# Patient Record
Sex: FEMALE | Race: WHITE | NOT HISPANIC OR LATINO | Employment: OTHER | URBAN - METROPOLITAN AREA
[De-identification: names, ages, dates, MRNs, and addresses within clinical notes are randomized per-mention and may not be internally consistent; named-entity substitution may affect disease eponyms.]

---

## 2017-03-23 ENCOUNTER — CLINICAL SUPPORT (OUTPATIENT)
Dept: SMOKING CESSATION | Facility: CLINIC | Age: 72
End: 2017-03-23
Payer: COMMERCIAL

## 2017-03-23 DIAGNOSIS — F17.200 NICOTINE DEPENDENCE: Primary | ICD-10-CM

## 2017-03-23 PROCEDURE — 99404 PREV MED CNSL INDIV APPRX 60: CPT | Mod: S$GLB,,,

## 2017-03-23 PROCEDURE — 99999 PR PBB SHADOW E&M-EST. PATIENT-LVL I: CPT | Mod: PBBFAC,,,

## 2017-03-23 RX ORDER — NICOTINE 7MG/24HR
1 PATCH, TRANSDERMAL 24 HOURS TRANSDERMAL DAILY
Qty: 28 PATCH | Refills: 0 | Status: SHIPPED | OUTPATIENT
Start: 2017-03-23 | End: 2017-04-22

## 2017-03-23 RX ORDER — DM/P-EPHED/ACETAMINOPH/DOXYLAM 30-7.5/3
2 LIQUID (ML) ORAL
Qty: 96 LOZENGE | Refills: 0 | Status: SHIPPED | OUTPATIENT
Start: 2017-03-23 | End: 2017-04-22

## 2017-03-23 NOTE — Clinical Note
Pt seen at intake today. She states she smoked her last cigarette last nigh. She was smoking 20 cigs/day.  Discussed tobacco cessation medication of 7 mg nicotine patch QD and 2 mg nicotine lozenge PRN (1-2 per hour in place of cigarettes). Will see pt back in group in 1 wk.

## 2017-03-23 NOTE — PROGRESS NOTES
See Tobacco Cessation Intake Form for patient assessment and recommendations.  Exhaled carbon monoxide level was 4 ppm per [facility]

## 2017-03-30 ENCOUNTER — CLINICAL SUPPORT (OUTPATIENT)
Dept: SMOKING CESSATION | Facility: CLINIC | Age: 72
End: 2017-03-30
Payer: COMMERCIAL

## 2017-03-30 DIAGNOSIS — F17.200 NICOTINE DEPENDENCE: Primary | ICD-10-CM

## 2017-03-30 PROCEDURE — 99407 BEHAV CHNG SMOKING > 10 MIN: CPT | Mod: S$GLB,,,

## 2017-03-30 NOTE — PROGRESS NOTES
Individual Follow-Up Form    3/30/2017    Clinical Status of Patient: Outpatient    Length of Service: 15 minutes    Continuing Medication: yes  Patches    Other Medications: none     Target Symptoms: Withdrawal and medication side effects. The following were  rated moderate (3) to severe (4) on TCRS:  · Moderate (3): none  · Severe (4): none    Comments:  Called patient to check on her progress towards tobacco cessation. She has not been in group due to recent illness. She has been tobacco free for over a week now. She states that as soon as she started using patches the urge to smoke left her. She remains on tobacco cessation medications of 7 mg nicotine patch QD and 2 mg nicotine lozenge PRN. She has never received lozenges from pharmacy. Checked medication list and it was received by pharmacy on 3/23/17. She states her really does not need them. Congratulated her on her success and encourage her to keep up the great work. Reminded her of group this coming Monday. She states she will be there. No adverse effects/mental changes noted at this time.    Diagnosis: F17.200    Next Visit: 1 week

## 2017-04-03 ENCOUNTER — CLINICAL SUPPORT (OUTPATIENT)
Dept: SMOKING CESSATION | Facility: CLINIC | Age: 72
End: 2017-04-03
Payer: COMMERCIAL

## 2017-04-03 DIAGNOSIS — F17.200 NICOTINE DEPENDENCE: ICD-10-CM

## 2017-04-03 PROCEDURE — 90853 GROUP PSYCHOTHERAPY: CPT | Mod: S$GLB,,,

## 2017-04-03 PROCEDURE — 99999 PR PBB SHADOW E&M-EST. PATIENT-LVL I: CPT | Mod: PBBFAC,,,

## 2017-04-03 NOTE — Clinical Note
Pt seen in group last night. She is tobacco free at this time. Pt remains on tobacco cessation medication of 7 mg nicotine patch QD and 2 mg nicotine lozenge PRN (1-2 per hour in place of cigarettes) for break through tobacco cravings/urges. Exhaled carbon monoxide level was 0 ppm per [facility]. No adverse effects/mental changes noted at this time. Will see pt back in group in 1 wk.

## 2017-04-04 NOTE — PROGRESS NOTES
Smoking Cessation Group Session #4    Site: [facility]  Date:  4/3/17  Clinical Status of Patient: Outpatient   Length of Service and Code: 90 minutes - 03255   Number in Attendance: 3  Group Activities/Focus of Group:  completion of TCRS (Tobacco Cessation Rating Scale) reviewed strategies, habitual behavior, stress, and high risk situations. Introduced stress with addition interventions, SOLVE, relaxation with interventions, nutrition, exercise, weight gain, and the importance of rewarding oneself for accomplishments toward becoming tobacco free. Open discussion of all items with interventions.   Target symptoms:  withdrawal and medication side effects             The following were rated moderate (3) to severe (4) on TCRS:       Moderate 3: nausea/upset stomach     Severe 4: none  Patient's Response to Intervention:  Active participation, self-disclosure, supportive of group and peers. Pt is tobacco free at this time. Pt remains on tobacco cessation medication of 7 mg nicotine patch QD and 2 mg nicotine lozenge PRN (1-2 per hour in place of cigarettes) for break through tobacco urges/cravings. Discussed symptoms rated as 3 or 4 on TCRS scale. All related to NRT or withdrawal symptoms. None bothersome to pt at this time, will monitor. No problems or adverse effects noted at this time. Congratulated her on her success and encouraged her to keep up the great work. Pt's exhaled carbon monoxide level was 0 ppm per smokerlyzer. Will see pt back in group 1 wk.   Progress Toward Goals and Other Mental Status Changes: The patient denies any abnormal behavioral or mental changes at this time.  Diagnosis: Z72.0  Plan: The patient will continue with group therapy sessions and tobacco cessation medication monitoring by CTTS.   Return to Clinic: 1 week

## 2017-04-10 ENCOUNTER — CLINICAL SUPPORT (OUTPATIENT)
Dept: SMOKING CESSATION | Facility: CLINIC | Age: 72
End: 2017-04-10
Payer: COMMERCIAL

## 2017-04-10 DIAGNOSIS — F17.200 NICOTINE DEPENDENCE: ICD-10-CM

## 2017-04-10 PROCEDURE — 90853 GROUP PSYCHOTHERAPY: CPT | Mod: S$GLB,,,

## 2017-04-10 NOTE — Clinical Note
Pt seen in group last night. She continues to smoke 5 cigs/day. Pt remains on tobacco cessation medication of 7 mg nicotine patch QD and 2 mg nicotine lozenge PRN (1-2 per hour in place of cigarettes) for break through tobacco cravings/urges. Pt will continue with rate reduction. Exhaled carbon monoxide level was 5 ppm per [facility]. No adverse effects/mental changes noted at this time. Will see pt back in group in 1 wk.

## 2017-04-11 NOTE — PROGRESS NOTES
Smoking Cessation Group Session #5    Site: [facility]  Date:  4/10/17  Clinical Status of Patient: Outpatient   Length of Service and Code: 90 minutes - [id number]   Number in Attendance: 4  Group Activities/Focus of Group:  completion of TCRS (Tobacco Cessation Rating Scale) reviewed strategies, habitual behavior, high risks situations, understanding urges and cravings, stress and relaxation with open discussion and additional interventions, Introduced lapses, relapses, understanding them and analyzing the situation of a lapse, conflict issues that may be linked to a lapse.   Target symptoms:  withdrawal and medication side effects             The following were rated moderate (3) to severe (4) on TCRS:       Moderate 3: none     Severe 4: none  Patient's Response to Intervention:  Active participation, self-disclosure, supportive of group and peers. Pt continues to smoke 7 cigs/day. Pt remains on tobacco cessation medication of 7 mg nicotine patch QD and 2 mg nicotine lozenge PRN (1-2 per hour in place of cigarettes) for break through tobacco urges/cravings. Discussed symptoms rated as 3 or 4 on TCRS scale, none reported at this time. No problems or adverse effects noted at this time. Pt asked to reduce smoking rate by 2 cigs/day. Pt's exhaled carbon monoxide level was 5 ppm per smokerlyzer. Will see pt back in group 1 wk.   Progress Toward Goals and Other Mental Status Changes: Pt will continue with rate reduction plan and wait times prior to smoking. The patient denies any abnormal behavioral or mental changes at this time.  Diagnosis: Z72.0  Plan: The patient will continue with group therapy sessions and tobacco cessation medication monitoring by CTTS.   Return to Clinic: 1 week

## 2017-05-29 ENCOUNTER — TELEPHONE (OUTPATIENT)
Dept: SMOKING CESSATION | Facility: CLINIC | Age: 72
End: 2017-05-29

## 2017-05-29 NOTE — TELEPHONE ENCOUNTER
Called to check up on patient, she has not been in group. She states she is currently in hospital, but will return to group as soon as she can. Will check up on her later. She has gone back to smoking.

## 2018-03-13 ENCOUNTER — TELEPHONE (OUTPATIENT)
Dept: SMOKING CESSATION | Facility: CLINIC | Age: 73
End: 2018-03-13

## 2018-03-14 ENCOUNTER — CLINICAL SUPPORT (OUTPATIENT)
Dept: SMOKING CESSATION | Facility: CLINIC | Age: 73
End: 2018-03-14
Payer: COMMERCIAL

## 2018-03-14 DIAGNOSIS — F17.200 NICOTINE DEPENDENCE: Primary | ICD-10-CM

## 2018-03-14 PROCEDURE — 99407 BEHAV CHNG SMOKING > 10 MIN: CPT | Mod: S$GLB,,,

## 2019-08-28 ENCOUNTER — OFFICE VISIT (OUTPATIENT)
Dept: URGENT CARE | Facility: CLINIC | Age: 74
End: 2019-08-28
Payer: MEDICARE

## 2019-08-28 VITALS
RESPIRATION RATE: 18 BRPM | WEIGHT: 170 LBS | HEART RATE: 67 BPM | TEMPERATURE: 96 F | DIASTOLIC BLOOD PRESSURE: 95 MMHG | OXYGEN SATURATION: 94 % | HEIGHT: 60 IN | BODY MASS INDEX: 33.38 KG/M2 | SYSTOLIC BLOOD PRESSURE: 188 MMHG

## 2019-08-28 DIAGNOSIS — I10 ELEVATED BLOOD PRESSURE READING IN OFFICE WITH DIAGNOSIS OF HYPERTENSION: ICD-10-CM

## 2019-08-28 DIAGNOSIS — R21 RASH AND NONSPECIFIC SKIN ERUPTION: Primary | ICD-10-CM

## 2019-08-28 PROCEDURE — 99203 OFFICE O/P NEW LOW 30 MIN: CPT | Mod: S$GLB,,, | Performed by: PHYSICIAN ASSISTANT

## 2019-08-28 PROCEDURE — 3077F SYST BP >= 140 MM HG: CPT | Mod: CPTII,S$GLB,, | Performed by: PHYSICIAN ASSISTANT

## 2019-08-28 PROCEDURE — 3080F PR MOST RECENT DIASTOLIC BLOOD PRESSURE >= 90 MM HG: ICD-10-PCS | Mod: CPTII,S$GLB,, | Performed by: PHYSICIAN ASSISTANT

## 2019-08-28 PROCEDURE — 3080F DIAST BP >= 90 MM HG: CPT | Mod: CPTII,S$GLB,, | Performed by: PHYSICIAN ASSISTANT

## 2019-08-28 PROCEDURE — 3077F PR MOST RECENT SYSTOLIC BLOOD PRESSURE >= 140 MM HG: ICD-10-PCS | Mod: CPTII,S$GLB,, | Performed by: PHYSICIAN ASSISTANT

## 2019-08-28 PROCEDURE — 99203 PR OFFICE/OUTPT VISIT, NEW, LEVL III, 30-44 MIN: ICD-10-PCS | Mod: S$GLB,,, | Performed by: PHYSICIAN ASSISTANT

## 2019-08-28 RX ORDER — TRIAMCINOLONE ACETONIDE 1 MG/G
OINTMENT TOPICAL 2 TIMES DAILY
Qty: 30 G | Refills: 0 | Status: ON HOLD | OUTPATIENT
Start: 2019-08-28 | End: 2020-08-28 | Stop reason: CLARIF

## 2019-08-28 RX ORDER — HYDROXYZINE HYDROCHLORIDE 25 MG/1
25 TABLET, FILM COATED ORAL 3 TIMES DAILY PRN
Qty: 20 TABLET | Refills: 0 | Status: ON HOLD | OUTPATIENT
Start: 2019-08-28 | End: 2020-08-28 | Stop reason: CLARIF

## 2019-08-29 NOTE — PATIENT INSTRUCTIONS
Self-Care for Skin Rashes     Pat your skin dry. Do not rub.     When your skin reacts to a substance your body is sensitive to, it can cause a rash. You can treat most rashes at home by keeping the skin clean and dry. Many rashes may get better on their own within 2 to 3 days. You may need medical attention if your rash itches, drains, or hurts, particularly if the rash is getting worse.  What can cause a skin rash?  · Sun poisoning, caused by too much exposure to the sun  · An irritant or allergic reaction to a certain type of food, plant, or chemical, such as  shellfish, poison ivy, and or cleaning products  · An infection caused by a fungus (ringworm), virus (chickenpox), or bacteria (strep)  · Bites or infestation caused by insects or pests, such as ticks, lice, or mites  · Dry skin, which is often seen during the winter months and in older people  How can I control itching and skin damage?  · Take soothing lukewarm baths in a colloidal oatmeal product. You can buy this at the [employer].  · Do your best not to scratch. Clip fingernails short, especially in young children, to reduce skin damage if scratching does occur.  · Use moisturizing skin lotion instead of scratching your dry skin.  · Use sunscreen whenever going out into direct sun.  · Use only mild cleansing agents whenever possible.  · Wash with mild, nonirritating soap and warm water.  · Wear clothing that breathes, such as cotton shirts or canvas shoes.  · If fluid is seeping from the rash, cover it loosely with clean gauze to absorb the discharge.  · Many rashes are contagious. Prevent the rash from spreading to others by washing your hands often before or after touching others with any skin rash.  Use medicine  · Antihistamines such as diphenhydramine can help control itching. But use with caution because they can make you drowsy.  · Using over-the-counter hydrocortisone cream on small rashes may help reduce swelling and itching  · Most  over-the-counter antifungal medicines can treat athletes foot and many other fungal infections of the skin.  Check with your healthcare provider  Call your healthcare provider if:  · You were told that you have a fungal infection on your skin to make sure you have the correct type of medicine.  · You have questions or concerns about medicines or their side effects.     Call 911  Call 911 if either of these occur:  · Your tongue or lips start to swell  · You have difficulty breathing      Call your healthcare provider  Call your healthcare provider if any of these occur:  · Temperature of more than 101.0°F (38.3°C), or as directed  · Sore throat, a cough, or unusual fatigue  · Red, oozy, or painful rash gets worse. These are signs of infection.  · Rash covers your face, genitals, or most of your body  · Crusty sores or red rings that begin to spread  · You were exposed to someone who has a contagious rash, such as scabies or lice.  · Red bulls-eye rash with a white center (a sign of Lyme disease)  · You were told that you have resistant bacteria (MRSA) on your skin.   Date Last Reviewed: 5/12/2015  © 2793-4032 [employer]. [address] [place] [address], [place] [address]. All rights reserved. This information is not intended as a substitute for professional medical care. Always follow your healthcare professional's instructions.

## 2019-08-29 NOTE — PROGRESS NOTES
Subjective:       Patient ID: Melvi Conrad is a 73 y.o. female.    Vitals:  height is 5' (1.524 m) and weight is 77.1 kg (170 lb). Her temperature is 96 °F (35.6 °C). Her blood pressure is 188/95 (abnormal) and her pulse is 67. Her respiration is 18 and oxygen saturation is 94% (abnormal).     Chief Complaint: Rash    73-year-old female presents with pruritic rash on right forearm.  She noticed this about 2-3 weeks ago.  She has tried over-the-counter Benadryl cream with mild relief.  Patient states she feels otherwise healthy.  Patient also reports some swelling of the right wrist, but no pain or trauma.  Patient also has some erythematous papules over the lower extremities and on the trunk.  Patient denies starting any new medicines or contact with [facility] outside.    Rash   This is a new problem. The current episode started 1 to 4 weeks ago (2 weeks). The problem has been gradually worsening since onset. The affected locations include the left arm, right arm and back. The rash is characterized by redness and itchiness. She was exposed to nothing. Pertinent negatives include no cough, fever or sore throat. Past treatments include anti-itch cream and antibiotic cream. The treatment provided mild relief.       Constitution: Negative for chills and fever.   HENT: Negative for facial swelling and sore throat.    Neck: Negative for painful lymph nodes.   Eyes: Negative for eye itching and eyelid swelling.   Respiratory: Negative for cough.    Musculoskeletal: Negative for joint pain and joint swelling.   Skin: Positive for rash. Negative for color change, pale, wound, abrasion, laceration, lesion, skin thickening/induration, puncture wound, erythema, bruising, abscess, avulsion and hives.   Allergic/Immunologic: Positive for itching. Negative for environmental allergies, immunocompromised state and hives.   Hematologic/Lymphatic: Negative for swollen lymph nodes.       Objective:      Physical Exam   Constitutional:  She is oriented to person, place, and time. She appears well-developed and well-nourished.   HENT:   Head: Normocephalic and atraumatic. Head is without abrasion, without contusion and without laceration.   Right Ear: External ear normal.   Left Ear: External ear normal.   Nose: Nose normal.   Mouth/Throat: Oropharynx is clear and moist.   Eyes: Pupils are equal, round, and reactive to light. Conjunctivae, EOM and lids are normal.   Neck: Trachea normal, full passive range of motion without pain and phonation normal. Neck supple.   Cardiovascular: Normal rate, regular rhythm and normal heart sounds.   Pulmonary/Chest: Effort normal and breath sounds normal. No stridor. No respiratory distress.   Musculoskeletal: Normal range of motion.        Right wrist: She exhibits swelling. She exhibits normal range of motion and no tenderness.   Neurological: She is alert and oriented to person, place, and time.   Skin: Skin is warm, dry and intact. Capillary refill takes less than 2 seconds. Rash noted. No abrasion, no bruising, no burn, no ecchymosis, no laceration and no lesion noted. Rash is papular. No erythema.   Psychiatric: She has a normal mood and affect. Her speech is normal and behavior is normal. Judgment and thought content normal. Cognition and memory are normal.   Nursing note and vitals reviewed.              Assessment:       1. Rash and nonspecific skin eruption    2. Elevated blood pressure reading in office with diagnosis of hypertension        Plan:         Rash and nonspecific skin eruption  -     triamcinolone acetonide 0.1% (KENALOG) 0.1 % ointment; Apply topically 2 (two) times daily. AAA  Dispense: 30 g; Refill: 0  -     hydrOXYzine HCl (ATARAX) 25 MG tablet; Take 1 tablet (25 mg total) by mouth 3 (three) times daily as needed for Itching.  Dispense: 20 tablet; Refill: 0    Elevated blood pressure reading in office with diagnosis of hypertension      Patient Instructions       Self-Care for Skin  Rashes     Pat your skin dry. Do not rub.     When your skin reacts to a substance your body is sensitive to, it can cause a rash. You can treat most rashes at home by keeping the skin clean and dry. Many rashes may get better on their own within 2 to 3 days. You may need medical attention if your rash itches, drains, or hurts, particularly if the rash is getting worse.  What can cause a skin rash?  · Sun poisoning, caused by too much exposure to the sun  · An irritant or allergic reaction to a certain type of food, plant, or chemical, such as  shellfish, poison ivy, and or cleaning products  · An infection caused by a fungus (ringworm), virus (chickenpox), or bacteria (strep)  · Bites or infestation caused by insects or pests, such as ticks, lice, or mites  · Dry skin, which is often seen during the winter months and in older people  How can I control itching and skin damage?  · Take soothing lukewarm baths in a colloidal oatmeal product. You can buy this at the [employer].  · Do your best not to scratch. Clip fingernails short, especially in young children, to reduce skin damage if scratching does occur.  · Use moisturizing skin lotion instead of scratching your dry skin.  · Use sunscreen whenever going out into direct sun.  · Use only mild cleansing agents whenever possible.  · Wash with mild, nonirritating soap and warm water.  · Wear clothing that breathes, such as cotton shirts or canvas shoes.  · If fluid is seeping from the rash, cover it loosely with clean gauze to absorb the discharge.  · Many rashes are contagious. Prevent the rash from spreading to others by washing your hands often before or after touching others with any skin rash.  Use medicine  · Antihistamines such as diphenhydramine can help control itching. But use with caution because they can make you drowsy.  · Using over-the-counter hydrocortisone cream on small rashes may help reduce swelling and itching  · Most over-the-counter antifungal  medicines can treat athletes foot and many other fungal infections of the skin.  Check with your healthcare provider  Call your healthcare provider if:  · You were told that you have a fungal infection on your skin to make sure you have the correct type of medicine.  · You have questions or concerns about medicines or their side effects.     Call 911  Call 911 if either of these occur:  · Your tongue or lips start to swell  · You have difficulty breathing      Call your healthcare provider  Call your healthcare provider if any of these occur:  · Temperature of more than 101.0°F (38.3°C), or as directed  · Sore throat, a cough, or unusual fatigue  · Red, oozy, or painful rash gets worse. These are signs of infection.  · Rash covers your face, genitals, or most of your body  · Crusty sores or red rings that begin to spread  · You were exposed to someone who has a contagious rash, such as scabies or lice.  · Red bulls-eye rash with a white center (a sign of Lyme disease)  · You were told that you have resistant bacteria (MRSA) on your skin.   Date Last Reviewed: 5/12/2015  © 1799-7573 [employer]. [address] [place] [address], [place] [address]. All rights reserved. This information is not intended as a substitute for professional medical care. Always follow your healthcare professional's instructions.

## 2020-08-28 ENCOUNTER — HOSPITAL ENCOUNTER (INPATIENT)
Facility: HOSPITAL | Age: 75
LOS: 12 days | Discharge: HOME | DRG: 177 | End: 2020-09-09
Attending: EMERGENCY MEDICINE | Admitting: INTERNAL MEDICINE
Payer: MEDICARE

## 2020-08-28 DIAGNOSIS — E87.5 HYPERKALEMIA: ICD-10-CM

## 2020-08-28 DIAGNOSIS — G40.909 SEIZURE DISORDER: ICD-10-CM

## 2020-08-28 DIAGNOSIS — Z75.1: ICD-10-CM

## 2020-08-28 DIAGNOSIS — I10 ESSENTIAL HYPERTENSION: Chronic | ICD-10-CM

## 2020-08-28 DIAGNOSIS — Z20.822 SUSPECTED COVID-19 VIRUS INFECTION: ICD-10-CM

## 2020-08-28 DIAGNOSIS — U07.1 COVID-19: ICD-10-CM

## 2020-08-28 DIAGNOSIS — Z86.16 HISTORY OF 2019 NOVEL CORONAVIRUS DISEASE (COVID-19): ICD-10-CM

## 2020-08-28 DIAGNOSIS — R06.02 SOB (SHORTNESS OF BREATH): ICD-10-CM

## 2020-08-28 DIAGNOSIS — R79.89 TROPONIN LEVEL ELEVATED: ICD-10-CM

## 2020-08-28 DIAGNOSIS — R09.02 HYPOXIA: ICD-10-CM

## 2020-08-28 DIAGNOSIS — J44.1 COPD EXACERBATION: Primary | ICD-10-CM

## 2020-08-28 DIAGNOSIS — J96.01 ACUTE HYPOXEMIC RESPIRATORY FAILURE: ICD-10-CM

## 2020-08-28 PROBLEM — D64.9 ANEMIA: Status: ACTIVE | Noted: 2020-08-28

## 2020-08-28 LAB
ABO + RH BLD: NORMAL
ALBUMIN SERPL BCP-MCNC: 2.6 G/DL (ref 3.5–5.2)
ALP SERPL-CCNC: 70 U/L (ref 55–135)
ALT SERPL W/O P-5'-P-CCNC: 20 U/L (ref 10–44)
ANION GAP SERPL CALC-SCNC: 8 MMOL/L (ref 8–16)
AST SERPL-CCNC: 21 U/L (ref 10–40)
BASOPHILS # BLD AUTO: 0.03 K/UL (ref 0–0.2)
BASOPHILS NFR BLD: 0.4 % (ref 0–1.9)
BILIRUB SERPL-MCNC: 0.7 MG/DL (ref 0.1–1)
BLD GP AB SCN CELLS X3 SERPL QL: NORMAL
BNP SERPL-MCNC: 167 PG/ML (ref 0–99)
BUN SERPL-MCNC: 13 MG/DL (ref 8–23)
CALCIUM SERPL-MCNC: 8.5 MG/DL (ref 8.7–10.5)
CHLORIDE SERPL-SCNC: 101 MMOL/L (ref 95–110)
CK SERPL-CCNC: 34 U/L (ref 20–180)
CO2 SERPL-SCNC: 28 MMOL/L (ref 23–29)
CREAT SERPL-MCNC: 0.8 MG/DL (ref 0.5–1.4)
CRP SERPL-MCNC: 101 MG/L (ref 0–8.2)
D DIMER PPP IA.FEU-MCNC: 2.54 MG/L FEU
DIFFERENTIAL METHOD: ABNORMAL
EOSINOPHIL # BLD AUTO: 0.6 K/UL (ref 0–0.5)
EOSINOPHIL NFR BLD: 8.7 % (ref 0–8)
ERYTHROCYTE [DISTWIDTH] IN BLOOD BY AUTOMATED COUNT: 16.1 % (ref 11.5–14.5)
EST. GFR  (AFRICAN AMERICAN): >60 ML/MIN/1.73 M^2
EST. GFR  (NON AFRICAN AMERICAN): >60 ML/MIN/1.73 M^2
FERRITIN SERPL-MCNC: 353 NG/ML (ref 20–300)
GLUCOSE SERPL-MCNC: 124 MG/DL (ref 70–110)
HCT VFR BLD AUTO: 22.4 % (ref 37–48.5)
HGB BLD-MCNC: 7 G/DL (ref 12–16)
IMM GRANULOCYTES # BLD AUTO: 0.07 K/UL (ref 0–0.04)
IMM GRANULOCYTES NFR BLD AUTO: 1 % (ref 0–0.5)
LACTATE SERPL-SCNC: 1.3 MMOL/L (ref 0.5–2.2)
LDH SERPL L TO P-CCNC: 432 U/L (ref 110–260)
LYMPHOCYTES # BLD AUTO: 1.2 K/UL (ref 1–4.8)
LYMPHOCYTES NFR BLD: 17.3 % (ref 18–48)
MCH RBC QN AUTO: 33.3 PG (ref 27–31)
MCHC RBC AUTO-ENTMCNC: 31.3 G/DL (ref 32–36)
MCV RBC AUTO: 107 FL (ref 82–98)
MONOCYTES # BLD AUTO: 0.8 K/UL (ref 0.3–1)
MONOCYTES NFR BLD: 11.8 % (ref 4–15)
NEUTROPHILS # BLD AUTO: 4.3 K/UL (ref 1.8–7.7)
NEUTROPHILS NFR BLD: 60.8 % (ref 38–73)
NRBC BLD-RTO: 1 /100 WBC
PLATELET # BLD AUTO: 197 K/UL (ref 150–350)
PMV BLD AUTO: 11.1 FL (ref 9.2–12.9)
POTASSIUM SERPL-SCNC: 4.4 MMOL/L (ref 3.5–5.1)
PROCALCITONIN SERPL IA-MCNC: 0.07 NG/ML
PROT SERPL-MCNC: 6.8 G/DL (ref 6–8.4)
RBC # BLD AUTO: 2.1 M/UL (ref 4–5.4)
SODIUM SERPL-SCNC: 137 MMOL/L (ref 136–145)
TROPONIN I SERPL DL<=0.01 NG/ML-MCNC: 0.09 NG/ML (ref 0–0.03)
WBC # BLD AUTO: 7.05 K/UL (ref 3.9–12.7)

## 2020-08-28 PROCEDURE — 84484 ASSAY OF TROPONIN QUANT: CPT

## 2020-08-28 PROCEDURE — 86140 C-REACTIVE PROTEIN: CPT

## 2020-08-28 PROCEDURE — 93010 EKG 12-LEAD: ICD-10-PCS | Mod: 76,,, | Performed by: INTERNAL MEDICINE

## 2020-08-28 PROCEDURE — 27000221 HC OXYGEN, UP TO 24 HOURS

## 2020-08-28 PROCEDURE — 85379 FIBRIN DEGRADATION QUANT: CPT

## 2020-08-28 PROCEDURE — 99285 EMERGENCY DEPT VISIT HI MDM: CPT | Mod: 25

## 2020-08-28 PROCEDURE — 93005 ELECTROCARDIOGRAM TRACING: CPT

## 2020-08-28 PROCEDURE — 63600175 PHARM REV CODE 636 W HCPCS: Performed by: HOSPITALIST

## 2020-08-28 PROCEDURE — 86901 BLOOD TYPING SEROLOGIC RH(D): CPT

## 2020-08-28 PROCEDURE — 11000001 HC ACUTE MED/SURG PRIVATE ROOM

## 2020-08-28 PROCEDURE — 94761 N-INVAS EAR/PLS OXIMETRY MLT: CPT

## 2020-08-28 PROCEDURE — 85025 COMPLETE CBC W/AUTO DIFF WBC: CPT

## 2020-08-28 PROCEDURE — 93010 ELECTROCARDIOGRAM REPORT: CPT | Mod: ,,, | Performed by: INTERNAL MEDICINE

## 2020-08-28 PROCEDURE — 99285 EMERGENCY DEPT VISIT HI MDM: CPT | Mod: ,,, | Performed by: EMERGENCY MEDICINE

## 2020-08-28 PROCEDURE — 99285 PR EMERGENCY DEPT VISIT,LEVEL V: ICD-10-PCS | Mod: ,,, | Performed by: EMERGENCY MEDICINE

## 2020-08-28 PROCEDURE — 99223 PR INITIAL HOSPITAL CARE,LEVL III: ICD-10-PCS | Mod: ,,, | Performed by: HOSPITALIST

## 2020-08-28 PROCEDURE — 80053 COMPREHEN METABOLIC PANEL: CPT

## 2020-08-28 PROCEDURE — 83615 LACTATE (LD) (LDH) ENZYME: CPT

## 2020-08-28 PROCEDURE — 82550 ASSAY OF CK (CPK): CPT

## 2020-08-28 PROCEDURE — 83880 ASSAY OF NATRIURETIC PEPTIDE: CPT

## 2020-08-28 PROCEDURE — 99900035 HC TECH TIME PER 15 MIN (STAT)

## 2020-08-28 PROCEDURE — 94640 AIRWAY INHALATION TREATMENT: CPT

## 2020-08-28 PROCEDURE — 82728 ASSAY OF FERRITIN: CPT

## 2020-08-28 PROCEDURE — 93010 ELECTROCARDIOGRAM REPORT: CPT | Mod: 76,,, | Performed by: INTERNAL MEDICINE

## 2020-08-28 PROCEDURE — 84145 PROCALCITONIN (PCT): CPT

## 2020-08-28 PROCEDURE — 99223 1ST HOSP IP/OBS HIGH 75: CPT | Mod: ,,, | Performed by: HOSPITALIST

## 2020-08-28 PROCEDURE — 83605 ASSAY OF LACTIC ACID: CPT

## 2020-08-28 PROCEDURE — 86920 COMPATIBILITY TEST SPIN: CPT

## 2020-08-28 RX ORDER — IPRATROPIUM BROMIDE AND ALBUTEROL SULFATE 2.5; .5 MG/3ML; MG/3ML
3 SOLUTION RESPIRATORY (INHALATION) EVERY 6 HOURS PRN
Status: DISCONTINUED | OUTPATIENT
Start: 2020-08-28 | End: 2020-08-29

## 2020-08-28 RX ORDER — ASPIRIN 325 MG
325 TABLET ORAL DAILY
Status: DISCONTINUED | OUTPATIENT
Start: 2020-08-29 | End: 2020-08-28

## 2020-08-28 RX ORDER — RIVAROXABAN 20 MG/1
20 TABLET, FILM COATED ORAL
Status: ON HOLD | COMMUNITY
End: 2020-09-05 | Stop reason: HOSPADM

## 2020-08-28 RX ORDER — ATORVASTATIN CALCIUM 40 MG/1
40 TABLET, FILM COATED ORAL DAILY
COMMUNITY

## 2020-08-28 RX ORDER — SODIUM CHLORIDE 0.9 % (FLUSH) 0.9 %
10 SYRINGE (ML) INJECTION
Status: DISCONTINUED | OUTPATIENT
Start: 2020-08-28 | End: 2020-08-29

## 2020-08-28 RX ORDER — ATORVASTATIN CALCIUM 20 MG/1
40 TABLET, FILM COATED ORAL DAILY
Status: DISCONTINUED | OUTPATIENT
Start: 2020-08-29 | End: 2020-09-09 | Stop reason: HOSPADM

## 2020-08-28 RX ORDER — IBUPROFEN 200 MG
16 TABLET ORAL
Status: DISCONTINUED | OUTPATIENT
Start: 2020-08-28 | End: 2020-09-09 | Stop reason: HOSPADM

## 2020-08-28 RX ORDER — HYDRALAZINE HYDROCHLORIDE 25 MG/1
25 TABLET, FILM COATED ORAL 4 TIMES DAILY
Status: DISCONTINUED | OUTPATIENT
Start: 2020-08-29 | End: 2020-09-09 | Stop reason: HOSPADM

## 2020-08-28 RX ORDER — LEVETIRACETAM 500 MG/1
500 TABLET ORAL 2 TIMES DAILY
COMMUNITY

## 2020-08-28 RX ORDER — METOPROLOL TARTRATE 25 MG/1
25 TABLET, FILM COATED ORAL 2 TIMES DAILY
COMMUNITY

## 2020-08-28 RX ORDER — LEVETIRACETAM 500 MG/1
500 TABLET ORAL 2 TIMES DAILY
Status: DISCONTINUED | OUTPATIENT
Start: 2020-08-29 | End: 2020-09-09 | Stop reason: HOSPADM

## 2020-08-28 RX ORDER — ALBUTEROL SULFATE 90 UG/1
2 AEROSOL, METERED RESPIRATORY (INHALATION) EVERY 6 HOURS PRN
Status: DISCONTINUED | OUTPATIENT
Start: 2020-08-28 | End: 2020-09-09 | Stop reason: HOSPADM

## 2020-08-28 RX ORDER — SODIUM CHLORIDE 0.9 % (FLUSH) 0.9 %
10 SYRINGE (ML) INJECTION
Status: DISCONTINUED | OUTPATIENT
Start: 2020-08-28 | End: 2020-09-09 | Stop reason: HOSPADM

## 2020-08-28 RX ORDER — AMLODIPINE BESYLATE 10 MG/1
10 TABLET ORAL DAILY
Status: DISCONTINUED | OUTPATIENT
Start: 2020-08-29 | End: 2020-09-09 | Stop reason: HOSPADM

## 2020-08-28 RX ORDER — ONDANSETRON 2 MG/ML
4 INJECTION INTRAMUSCULAR; INTRAVENOUS EVERY 8 HOURS PRN
Status: DISCONTINUED | OUTPATIENT
Start: 2020-08-28 | End: 2020-09-09 | Stop reason: HOSPADM

## 2020-08-28 RX ORDER — TALC
6 POWDER (GRAM) TOPICAL NIGHTLY PRN
Status: DISCONTINUED | OUTPATIENT
Start: 2020-08-28 | End: 2020-09-09 | Stop reason: HOSPADM

## 2020-08-28 RX ORDER — AMLODIPINE BESYLATE 10 MG/1
10 TABLET ORAL DAILY
COMMUNITY

## 2020-08-28 RX ORDER — CEFTRIAXONE 1 G/1
1 INJECTION, POWDER, FOR SOLUTION INTRAMUSCULAR; INTRAVENOUS
Status: DISCONTINUED | OUTPATIENT
Start: 2020-08-29 | End: 2020-09-02

## 2020-08-28 RX ORDER — CYANOCOBALAMIN (VITAMIN B-12) 250 MCG
500 TABLET ORAL DAILY
Status: DISCONTINUED | OUTPATIENT
Start: 2020-08-29 | End: 2020-09-05

## 2020-08-28 RX ORDER — ACETAMINOPHEN 325 MG/1
650 TABLET ORAL EVERY 4 HOURS PRN
Status: DISCONTINUED | OUTPATIENT
Start: 2020-08-28 | End: 2020-09-09 | Stop reason: HOSPADM

## 2020-08-28 RX ORDER — ENOXAPARIN SODIUM 100 MG/ML
40 INJECTION SUBCUTANEOUS EVERY 24 HOURS
Status: DISCONTINUED | OUTPATIENT
Start: 2020-08-28 | End: 2020-08-28

## 2020-08-28 RX ORDER — BENZONATATE 100 MG/1
100 CAPSULE ORAL 3 TIMES DAILY PRN
COMMUNITY

## 2020-08-28 RX ORDER — HYDRALAZINE HYDROCHLORIDE 25 MG/1
25 TABLET, FILM COATED ORAL 4 TIMES DAILY
COMMUNITY

## 2020-08-28 RX ORDER — IPRATROPIUM BROMIDE AND ALBUTEROL SULFATE 2.5; .5 MG/3ML; MG/3ML
3 SOLUTION RESPIRATORY (INHALATION) ONCE
Status: COMPLETED | OUTPATIENT
Start: 2020-08-29 | End: 2020-08-28

## 2020-08-28 RX ORDER — PREDNISONE 20 MG/1
40 TABLET ORAL DAILY
Status: DISCONTINUED | OUTPATIENT
Start: 2020-08-29 | End: 2020-08-30

## 2020-08-28 RX ORDER — IBUPROFEN 200 MG
24 TABLET ORAL
Status: DISCONTINUED | OUTPATIENT
Start: 2020-08-28 | End: 2020-09-09 | Stop reason: HOSPADM

## 2020-08-28 RX ORDER — UBIDECARENONE 75 MG
500 CAPSULE ORAL DAILY
Status: ON HOLD | COMMUNITY
End: 2020-09-05 | Stop reason: HOSPADM

## 2020-08-28 RX ORDER — METOPROLOL TARTRATE 25 MG/1
25 TABLET, FILM COATED ORAL 2 TIMES DAILY
Status: DISCONTINUED | OUTPATIENT
Start: 2020-08-29 | End: 2020-09-09 | Stop reason: HOSPADM

## 2020-08-28 RX ADMIN — IPRATROPIUM BROMIDE AND ALBUTEROL SULFATE 3 ML: .5; 2.5 SOLUTION RESPIRATORY (INHALATION) at 11:08

## 2020-08-28 RX ADMIN — CEFTRIAXONE SODIUM 1 G: 1 INJECTION, POWDER, FOR SOLUTION INTRAMUSCULAR; INTRAVENOUS at 11:08

## 2020-08-28 RX ADMIN — AZITHROMYCIN MONOHYDRATE 500 MG: 500 INJECTION, POWDER, LYOPHILIZED, FOR SOLUTION INTRAVENOUS at 11:08

## 2020-08-28 RX ADMIN — ENOXAPARIN SODIUM 40 MG: 40 INJECTION SUBCUTANEOUS at 10:08

## 2020-08-28 NOTE — ED NOTES
Patient identifiers for Melvi Conrad 74 y.o. female checked and correct.  Chief Complaint   Patient presents with    COVID-19 Concerns     Diagnosed with covid on 08/21, called EMS for increasing shortness of breath today. Hx COPD.    Shortness of Breath     Past Medical History:   Diagnosis Date    COPD (chronic obstructive pulmonary disease)     Hypertension     Renal disorder     Seizures      Allergies reported:   Review of patient's allergies indicates:   Allergen Reactions    Nexium [esomeprazole magnesium] Hives    Pcn [penicillins] Hives     Patient presents via EMS on oxygen via [place] (2.5L normally). EMS increased to 6L via NC.     LOC: Patient is awake, alert, and aware of environment with an appropriate affect. Patient is oriented x 4 and speaking appropriately. Reports increased anxiety.  APPEARANCE: Patient resting comfortably and in no acute distress. Patient is clean and well groomed, patient's clothing is properly fastened.  HEENT: Wearing mask.   SKIN: The skin is warm and dry. Patient has normal skin turgor and moist mucus membranes. Denies fever or chills. Bruises to bilateral arms.   MUSKULOSKELETAL: Patient is moving all extremities well, no obvious deformities noted. Pulses intact. Ambulatory by self.   RESPIRATORY: Airway is open and patent. Respirations are spontaneous and labored with extra effort and rate. Reports worsening SOB since being transferred to rehab. States not receiving breathing treatments there. Positive from covid.   CARDIAC: Patient has a normal rate and rhythm. 73 on cardiac monitor. Bilateral arm and hand edema noted. Denies chest pain.   ABDOMEN: No distention noted. Soft and non-tender upon palpation. Reports nausea.  NEUROLOGICAL: pupils 3 mm, PERRL. Facial expression is symmetrical. Hand grasps are equal bilaterally. Normal sensation in all extremities when touched with finger.

## 2020-08-29 LAB
25(OH)D3+25(OH)D2 SERPL-MCNC: 35 NG/ML (ref 30–96)
ALBUMIN SERPL BCP-MCNC: 2.7 G/DL (ref 3.5–5.2)
ALP SERPL-CCNC: 76 U/L (ref 55–135)
ALT SERPL W/O P-5'-P-CCNC: 21 U/L (ref 10–44)
ANION GAP SERPL CALC-SCNC: 12 MMOL/L (ref 8–16)
AST SERPL-CCNC: 27 U/L (ref 10–40)
BASOPHILS # BLD AUTO: 0.04 K/UL (ref 0–0.2)
BASOPHILS NFR BLD: 0.6 % (ref 0–1.9)
BILIRUB SERPL-MCNC: 0.5 MG/DL (ref 0.1–1)
BUN SERPL-MCNC: 12 MG/DL (ref 8–23)
CALCIUM SERPL-MCNC: 8.9 MG/DL (ref 8.7–10.5)
CHLORIDE SERPL-SCNC: 100 MMOL/L (ref 95–110)
CO2 SERPL-SCNC: 25 MMOL/L (ref 23–29)
CREAT SERPL-MCNC: 0.8 MG/DL (ref 0.5–1.4)
DIFFERENTIAL METHOD: ABNORMAL
EOSINOPHIL # BLD AUTO: 0.1 K/UL (ref 0–0.5)
EOSINOPHIL NFR BLD: 1.6 % (ref 0–8)
ERYTHROCYTE [DISTWIDTH] IN BLOOD BY AUTOMATED COUNT: 16.8 % (ref 11.5–14.5)
EST. GFR  (AFRICAN AMERICAN): >60 ML/MIN/1.73 M^2
EST. GFR  (NON AFRICAN AMERICAN): >60 ML/MIN/1.73 M^2
GLUCOSE SERPL-MCNC: 141 MG/DL (ref 70–110)
HCT VFR BLD AUTO: 24.2 % (ref 37–48.5)
HGB BLD-MCNC: 7.3 G/DL (ref 12–16)
IMM GRANULOCYTES # BLD AUTO: 0.09 K/UL (ref 0–0.04)
IMM GRANULOCYTES NFR BLD AUTO: 1.3 % (ref 0–0.5)
LYMPHOCYTES # BLD AUTO: 0.6 K/UL (ref 1–4.8)
LYMPHOCYTES NFR BLD: 8.5 % (ref 18–48)
MCH RBC QN AUTO: 33.2 PG (ref 27–31)
MCHC RBC AUTO-ENTMCNC: 30.2 G/DL (ref 32–36)
MCV RBC AUTO: 110 FL (ref 82–98)
MONOCYTES # BLD AUTO: 0.4 K/UL (ref 0.3–1)
MONOCYTES NFR BLD: 6.1 % (ref 4–15)
NEUTROPHILS # BLD AUTO: 5.8 K/UL (ref 1.8–7.7)
NEUTROPHILS NFR BLD: 81.9 % (ref 38–73)
NRBC BLD-RTO: 1 /100 WBC
PLATELET # BLD AUTO: 203 K/UL (ref 150–350)
PMV BLD AUTO: 11.1 FL (ref 9.2–12.9)
POTASSIUM SERPL-SCNC: 4.4 MMOL/L (ref 3.5–5.1)
PROT SERPL-MCNC: 7.3 G/DL (ref 6–8.4)
RBC # BLD AUTO: 2.2 M/UL (ref 4–5.4)
SODIUM SERPL-SCNC: 137 MMOL/L (ref 136–145)
TROPONIN I SERPL DL<=0.01 NG/ML-MCNC: 0.05 NG/ML (ref 0–0.03)
WBC # BLD AUTO: 7.05 K/UL (ref 3.9–12.7)

## 2020-08-29 PROCEDURE — 97530 THERAPEUTIC ACTIVITIES: CPT

## 2020-08-29 PROCEDURE — 93005 ELECTROCARDIOGRAM TRACING: CPT

## 2020-08-29 PROCEDURE — 97165 OT EVAL LOW COMPLEX 30 MIN: CPT

## 2020-08-29 PROCEDURE — 36415 COLL VENOUS BLD VENIPUNCTURE: CPT

## 2020-08-29 PROCEDURE — 85025 COMPLETE CBC W/AUTO DIFF WBC: CPT

## 2020-08-29 PROCEDURE — 80053 COMPREHEN METABOLIC PANEL: CPT

## 2020-08-29 PROCEDURE — 25000003 PHARM REV CODE 250: Performed by: INTERNAL MEDICINE

## 2020-08-29 PROCEDURE — 25000242 PHARM REV CODE 250 ALT 637 W/ HCPCS: Performed by: HOSPITALIST

## 2020-08-29 PROCEDURE — 97535 SELF CARE MNGMENT TRAINING: CPT

## 2020-08-29 PROCEDURE — 63600175 PHARM REV CODE 636 W HCPCS: Performed by: HOSPITALIST

## 2020-08-29 PROCEDURE — 99233 SBSQ HOSP IP/OBS HIGH 50: CPT | Mod: ,,, | Performed by: INTERNAL MEDICINE

## 2020-08-29 PROCEDURE — 27100098 HC SPACER

## 2020-08-29 PROCEDURE — 99900035 HC TECH TIME PER 15 MIN (STAT)

## 2020-08-29 PROCEDURE — 94761 N-INVAS EAR/PLS OXIMETRY MLT: CPT

## 2020-08-29 PROCEDURE — 93010 EKG 12-LEAD: ICD-10-PCS | Mod: ,,, | Performed by: INTERNAL MEDICINE

## 2020-08-29 PROCEDURE — 93010 ELECTROCARDIOGRAM REPORT: CPT | Mod: ,,, | Performed by: INTERNAL MEDICINE

## 2020-08-29 PROCEDURE — 11000001 HC ACUTE MED/SURG PRIVATE ROOM

## 2020-08-29 PROCEDURE — 82306 VITAMIN D 25 HYDROXY: CPT

## 2020-08-29 PROCEDURE — 27000221 HC OXYGEN, UP TO 24 HOURS

## 2020-08-29 PROCEDURE — 84484 ASSAY OF TROPONIN QUANT: CPT

## 2020-08-29 PROCEDURE — 25000003 PHARM REV CODE 250: Performed by: HOSPITALIST

## 2020-08-29 PROCEDURE — 25500020 PHARM REV CODE 255: Performed by: INTERNAL MEDICINE

## 2020-08-29 PROCEDURE — 94640 AIRWAY INHALATION TREATMENT: CPT

## 2020-08-29 PROCEDURE — 25000242 PHARM REV CODE 250 ALT 637 W/ HCPCS: Performed by: INTERNAL MEDICINE

## 2020-08-29 PROCEDURE — 99233 PR SUBSEQUENT HOSPITAL CARE,LEVL III: ICD-10-PCS | Mod: ,,, | Performed by: INTERNAL MEDICINE

## 2020-08-29 PROCEDURE — 97162 PT EVAL MOD COMPLEX 30 MIN: CPT

## 2020-08-29 RX ORDER — FLUTICASONE FUROATE AND VILANTEROL 100; 25 UG/1; UG/1
1 POWDER RESPIRATORY (INHALATION) DAILY
Status: DISCONTINUED | OUTPATIENT
Start: 2020-08-29 | End: 2020-09-09 | Stop reason: HOSPADM

## 2020-08-29 RX ORDER — BENZONATATE 100 MG/1
100 CAPSULE ORAL 3 TIMES DAILY PRN
Status: DISCONTINUED | OUTPATIENT
Start: 2020-08-29 | End: 2020-09-09 | Stop reason: HOSPADM

## 2020-08-29 RX ORDER — IPRATROPIUM BROMIDE AND ALBUTEROL SULFATE 2.5; .5 MG/3ML; MG/3ML
3 SOLUTION RESPIRATORY (INHALATION)
Status: DISCONTINUED | OUTPATIENT
Start: 2020-08-29 | End: 2020-09-09 | Stop reason: HOSPADM

## 2020-08-29 RX ADMIN — ATORVASTATIN CALCIUM 40 MG: 20 TABLET, FILM COATED ORAL at 09:08

## 2020-08-29 RX ADMIN — HYDRALAZINE HYDROCHLORIDE 25 MG: 25 TABLET ORAL at 04:08

## 2020-08-29 RX ADMIN — GUAIFENESIN AND DEXTROMETHORPHAN HYDROBROMIDE 1 TABLET: 600; 30 TABLET, EXTENDED RELEASE ORAL at 08:08

## 2020-08-29 RX ADMIN — PREDNISONE 40 MG: 20 TABLET ORAL at 09:08

## 2020-08-29 RX ADMIN — LEVETIRACETAM 500 MG: 500 TABLET ORAL at 09:08

## 2020-08-29 RX ADMIN — HYDRALAZINE HYDROCHLORIDE 25 MG: 25 TABLET ORAL at 09:08

## 2020-08-29 RX ADMIN — AMLODIPINE BESYLATE 10 MG: 10 TABLET ORAL at 09:08

## 2020-08-29 RX ADMIN — IPRATROPIUM BROMIDE AND ALBUTEROL SULFATE 3 ML: .5; 2.5 SOLUTION RESPIRATORY (INHALATION) at 07:08

## 2020-08-29 RX ADMIN — HYDRALAZINE HYDROCHLORIDE 25 MG: 25 TABLET ORAL at 12:08

## 2020-08-29 RX ADMIN — FLUTICASONE FUROATE AND VILANTEROL TRIFENATATE 1 PUFF: 100; 25 POWDER RESPIRATORY (INHALATION) at 09:08

## 2020-08-29 RX ADMIN — LEVETIRACETAM 500 MG: 500 TABLET ORAL at 08:08

## 2020-08-29 RX ADMIN — ALBUTEROL SULFATE 2 PUFF: 90 AEROSOL, METERED RESPIRATORY (INHALATION) at 01:08

## 2020-08-29 RX ADMIN — IPRATROPIUM BROMIDE AND ALBUTEROL SULFATE 3 ML: .5; 2.5 SOLUTION RESPIRATORY (INHALATION) at 08:08

## 2020-08-29 RX ADMIN — BENZONATATE 100 MG: 100 CAPSULE ORAL at 04:08

## 2020-08-29 RX ADMIN — METOPROLOL TARTRATE 25 MG: 25 TABLET, FILM COATED ORAL at 08:08

## 2020-08-29 RX ADMIN — MELATONIN TAB 3 MG 6 MG: 3 TAB at 01:08

## 2020-08-29 RX ADMIN — METOPROLOL TARTRATE 25 MG: 25 TABLET, FILM COATED ORAL at 09:08

## 2020-08-29 RX ADMIN — HYDRALAZINE HYDROCHLORIDE 25 MG: 25 TABLET ORAL at 08:08

## 2020-08-29 RX ADMIN — IPRATROPIUM BROMIDE AND ALBUTEROL SULFATE 3 ML: .5; 2.5 SOLUTION RESPIRATORY (INHALATION) at 01:08

## 2020-08-29 RX ADMIN — IOHEXOL 100 ML: 350 INJECTION, SOLUTION INTRAVENOUS at 02:08

## 2020-08-29 RX ADMIN — CYANOCOBALAMIN TAB 250 MCG 500 MCG: 250 TAB at 09:08

## 2020-08-29 RX ADMIN — PREDNISONE 40 MG: 20 TABLET ORAL at 12:08

## 2020-08-29 NOTE — PLAN OF CARE
DX:Covid with acute hypoxic resp failure    Shift Events:v/s q 8hrs and monitor spo2     Plan of Care:monitor covid s/s and resolve hypoxia, abt therapy     Neuro: A/O x4     Respiratory:5 liters      Cardiac:N/S     Diet:cardiac      Gtts: azithromycin      Skin:bruising

## 2020-08-29 NOTE — NURSING
Pt arrived to unit via stretcher  with  escort, she is alert and oriented x4. Pt is verbal and has no complaints of pain at this time and appears to be in no distress. Pt v/s stable she was orieinted to room  and changed into gown with telemetry placed,and placed on 5 liters n/c.

## 2020-08-29 NOTE — PROGRESS NOTES
Hospital Medicine  Progress Note  [facility]      Patient Name: Melvi Conrad  MRN:  [id number]  Hospital Medicine Team: [facility] G Surinder Schrader MD  Date of Admission:  8/28/2020     Length of Stay:  LOS: 1 day       Principal Problem:  Acute hypoxemic respiratory failure      HPI:  75 yo F with PMHx HTN, CKD3, COPD on 2L NC, epilepsy, and CAD presents to the hospital from [facility] with worsening SOB 1 week after being discharged from [facility] for COVID-19 pneumonia. The patient reports that over the last 2 days at the [facility], she has developed a progressive decline in her breathing. She reports that she was unable to participate much with therapy today and yesterday 2/2 SOB. She reports that she had been unable to receive her nebulizer treatments while in the SNF due to concerns of aerosolization of the virus, and she believes this significantly affected her recovery. She denies any chest pain, lightheadedness, palpitations, fevers, chills, or new cough.       Per son, the pateint was admitted to [facility] for symptoms of difficulty breathing, coughing, and fatigue. She tested positive for COVID-19 on admission and was admitted from 8/5- 8/21. Pt. And son reports that she spent the majority of her stay in the ICU, but she was never intubated. She received remdesivir, steroids, and convalescent plasma per son. Pt. Was also reportedly started on xarelto, but the reasoning is unclear. The son and pt. Deny that she ever had a blood clot. They states that she was put on the medication to prevent blood clots in the setting of COVID-19 infection. She was improving at time of discharged, but did require 2L NC at time of discharge which she had been on at [facility] until today.      Hospital Course:  Management of acute respiratory failure with hypoxia due to COVID-19 infection.    Interval History:     Patient lying in bed, mild respiratory distress. Reports SOB is improving.  Notes bruising on his arms due to xarelto use. Denies upper extremity pain or swelling.     Review of Systems:  Constitutional: no fever or chills  Respiratory: Positive for cough and SOB  Cardiovascular: no chest pain or palpitations  Gastrointestinal: no nausea or vomiting, no abdominal pain or change in bowel habits  Genitourinary: no hematuria or dysuria  Integument/Breast: no rash or pruritis  Hematologic/Lymphatic: no easy bruising or lymphadenopathy  Musculoskeletal: no arthralgias or myalgias  Neurological: no seizures or tremors  Behavioral/Psych: no depression or anxiety    Inpatient Medications:    Current Facility-Administered Medications:     acetaminophen tablet 650 mg, 650 mg, Oral, Q4H PRN, Lan Cerrato MD    albuterol inhaler 2 puff, 2 puff, Inhalation, Q6H PRN, Lan Cerrato MD, 2 puff at 08/29/20 0116    albuterol-ipratropium 2.5 mg-0.5 mg/3 mL nebulizer solution 3 mL, 3 mL, Nebulization, Q6H WAKE, Lan Cerrato MD    amLODIPine tablet 10 mg, 10 mg, Oral, Daily, Lan Cerrato MD    atorvastatin tablet 40 mg, 40 mg, Oral, Daily, Lan Cerrato MD    azithromycin 500 mg in dextrose 5 % 250 mL IVPB (ready to mix system), 500 mg, Intravenous, Q24H, Lan Cerrato MD, Last Rate: 250 mL/hr at 08/28/20 2347, 500 mg at 08/28/20 2347    cefTRIAXone injection 1 g, 1 g, Intravenous, Q24H, Lan Cerrato MD, 1 g at 08/28/20 2347    cyanocobalamin tablet 500 mcg, 500 mcg, Oral, Daily, Lan Cerrato MD    glucose chewable tablet 16 g, 16 g, Oral, PRN, Lan Cerrato MD    glucose chewable tablet 24 g, 24 g, Oral, PRN, Lan Cerrato MD    hydrALAZINE tablet 25 mg, 25 mg, Oral, QID, Lan Cerrato MD    levETIRAcetam tablet 500 mg, 500 mg, Oral, BID, Lan Cerrato MD    melatonin tablet 6 mg, 6 mg, Oral, Nightly PRN, Lan Cerrato MD, 6 mg at 08/29/20 0105    metoprolol tartrate (LOPRESSOR) tablet 25 mg, 25 mg, Oral, BID, Lan Cerrato MD    ondansetron injection 4 mg, 4 mg,  "Intravenous, Q8H PRN, Lan Cerrato MD    predniSONE tablet 40 mg, 40 mg, Oral, Daily, Lan Cerrato MD, 40 mg at 08/29/20 0014    promethazine (PHENERGAN) 6.25 mg in dextrose 5 % 50 mL IVPB, 6.25 mg, Intravenous, Q6H PRN, Lan Cerrato MD    sodium chloride 0.9% flush 10 mL, 10 mL, Intravenous, PRN, Koki Mehta MD      Physical Exam:    No intake or output data in the 24 hours ending 08/29/20 0821  Wt Readings from Last 3 Encounters:   08/28/20 81.5 kg (179 lb 10.8 oz)   08/28/19 77.1 kg (170 lb)   07/28/14 66.2 kg (146 lb)       BP (!) 140/79 (BP Location: Left leg, Patient Position: Lying)   Pulse 82   Temp 98.1 °F (36.7 °C) (Oral)   Resp (!) 22   Ht 4' 11" (1.499 m)   Wt 81.5 kg (179 lb 10.8 oz)   SpO2 95%   Breastfeeding No   BMI 36.29 kg/m²     GEN: mild respiratory distress, conversant  Resp: Decreased breath sounds B/L, no wheezes or rales, normal work of breathing   CV: RRR, II/VI systolic ejection murmur, no edema  GI: soft, NTND  Skin: no rash  Neuro: CN grossly intact, no focal neurologic deficits    Laboratory:  No results found for: [id number]    Recent Labs   Lab 08/28/20 1939 08/29/20  0343   WBC 7.05 7.05   LYMPH 17.3*  1.2 8.5*  0.6*   HGB 7.0* 7.3*   HCT 22.4* 24.2*    203     Recent Labs   Lab 08/28/20 1939 08/29/20  0343    137   K 4.4 4.4    100   CO2 28 25   BUN 13 12   CREATININE 0.8 0.8   * 141*   CALCIUM 8.5* 8.9     Recent Labs   Lab 08/28/20 1939 08/29/20  0343   ALKPHOS 70 76   ALT 20 21   AST 21 27   ALBUMIN 2.6* 2.7*   PROT 6.8 7.3   BILITOT 0.7 0.5        Recent Labs     08/28/20  1939   DDIMER 2.54*   FERRITIN 353*   .0*   *   *   TROPONINI 0.092*   CPK 34       All labs within the last 24 hours were reviewed.     Microbiology:  Microbiology Results (last 7 days)     ** No results found for the last 168 hours. **            Imaging      No results found for this or any previous visit.    CTA Chest " Non-Coronary (PE Study)  Narrative: EXAMINATION:  CTA CHEST NON CORONARY    CLINICAL HISTORY:  PE suspected, high pretest prob;    TECHNIQUE:  Low dose axial images, sagittal and coronal reformations were obtained from the thoracic inlet to the lung bases following the IV administration of 100 mL of Omnipaque 350.  Contrast timing was optimized to evaluate the pulmonary arteries.    COMPARISON:  Radiograph chest AP portable 08/28/2020    FINDINGS:  Pulmonary vasculature: Satisfactory opacification of the pulmonary arterial system with no filling defect to the segmental level.    Aorta: Left-sided aortic arch.  No aneurysmal dilatation.  Scattered calcified and noncalcified atherosclerosis.    Base of Neck: No significant abnormality.    Thoracic soft tissues: Unremarkable    Heart: Normal size. No effusion.  Prominent pericardial fat.  Coronary artery, mitral annular, and aortic valve atherosclerotic calcification..    Veronica/Mediastinum: No pathologic kenia enlargement.    Airways: The central airways are patent.    Lungs/Pleura: Small bilateral pleural effusions with associated compressive atelectasis, right greater than left.  Interlobular septal thickening and bilateral ground-glass opacities, primarily within a peripheral distribution.  No pneumothorax..    Esophagus: Mildly distended and fluid-filled.    Upper Abdomen: No abnormality of the partially imaged upper abdomen.    Bones: Degenerative changes.  No acute fracture. No suspicious lytic or sclerotic lesions.  Impression: No pulmonary thromboembolism or infarct.    Small bilateral pleural effusions with associated compressive atelectasis.  Bilateral peripherally based ground-glass opacities, which may be seen in the setting of viral infection.    Esophagus is distended with fluid, which could be concerning for aspiration risk.    Significant calcified and noncalcified atherosclerosis.    Electronically signed by resident: Skylar  Theo  Date:    08/29/2020  Time:    02:41    Electronically signed by: Asher Chavez MD  Date:    08/29/2020  Time:    03:07      All imaging within the last 24 hours was reviewed.     Assessment and Plan:    Active Hospital Problems    Diagnosis  POA    *Acute hypoxemic respiratory failure [J96.01]  Unknown    COVID-19 [U07.1]  Yes    COPD exacerbation [J44.1]  Unknown    Anemia [D64.9]  Unknown      Resolved Hospital Problems   No resolved problems to display.       Scheduled Meds:   albuterol-ipratropium  3 mL Nebulization Q6H WAKE    amLODIPine  10 mg Oral Daily    atorvastatin  40 mg Oral Daily    azithromycin  500 mg Intravenous Q24H    cefTRIAXone (ROCEPHIN) IVPB  1 g Intravenous Q24H    cyanocobalamin  500 mcg Oral Daily    hydrALAZINE  25 mg Oral QID    levETIRAcetam  500 mg Oral BID    metoprolol tartrate  25 mg Oral BID    predniSONE  40 mg Oral Daily     Continuous Infusions:  PRN Meds:.acetaminophen, albuterol, glucose, glucose, melatonin, ondansetron, promethazine (PHENERGAN) IVPB, sodium chloride 0.9%      COVID-19 Virus Infection  Viral Pneumonia due to COVID-19    - COVID-19 testing:   - Isolation: Airborne/Droplet. Surgical mask on patient. Notify Infection Control  - Diagnostics: Trend Q48hrs if stable, more frequently if patient decompensating     Laboratory/Study Frequency Result   CBC Admit & Q48h    CMP Admit & Q48h    D-dimer Admit & Q48h    Ferritin Admit & Q48h    CRP Admit & Q48h    CPK Admit & Q24h if elevated    LDH Admit & Q48h    Vitamin D Admit    BNP Admit    Troponin Admit & Q6h if elevated    Procalcitonin Admit      Lymphopenia, hyponatremia, hyperferritinemia, elevated troponin, elevated d-dimer, age, and medical comorbidities are significant predictors of poor clinical outcome    - Management: per [facility] COVID Treatment Protocol (4/15/20)    - Monitoring:   - Telemetry & Continuous Pulse Oximetry if > 3L via NC    - Nutrition:    - Multivitamin PO daily   -  Boost supplement   - Vitamin D 1000IU daily if deficient   - Ascorbic acid 500mg PO bid    - Supportive Care:   - acetaminophen 650mg PO Q6hr PRN fever/headache   - loperamide PRN viral diarrhea   - IVF if indicated, restrictive strategy preferred, no maintenance IV if able   - VTE PPx: enoxaparin or heparin SQ unless contraindicated    - Antibiotics:   - ceftriaxone 1g Q24h x 5 days  - azithromycin 500mg IV/PO x3    - Investigational Therapies:  - atorvastatin 40mg po daily    Acute Hypoxemic Respiratory Failure    - Order RT consult via Respiratory Communication for COVID Protocols  - Order Incentive Spirometer Q4h  - Order Flutter Valve Q4h  - Continuous Pulse Oximetry  - Goal SpO2 92-96%  - Supplemental O2 via LFNC, VentiMask, or HFNC (see Respiratory Support Oxygen Therapies)  - If wheezing, albuterol INH Q6h scheduled & PRN  - Proning Protocol if patient is a candidate (see HM Proning Protocol)   - GCS >13, able to self-prone  - If deterioration, may warrant trial of NIPPV and transfer to [facility] pressure room or immediate ICU consult  - on prednisone for COPD exacerbation so not starting dexamethasone    COPD exacerbation  - duonebs  - Breo   - prednisone 40 mg daily   - wean O2 as tolerated     Anemia  - Hgb 7.0 on presentation, baseline unclear. Pt. Denies any melena or hematochezia  - Pt. Recently started On xarelto per SNF paperwork, but no diagnosis of blood clot listed and pt. Denies ever having one  - CTA ordered to rule out PE  - will hold xarelto in meantime given the anemia  - Continue home B12 supplementation  - Monitor CBC and transfuse to goal >7.     Anticoagulation use  - on Xarelto at home  - family report that patient was prescribed Xarelto as ppx in the setting of COVID infection   - holding Xarelto due to anemia  - US upper and lower extremities negative for DVT  - no h/o afib   - followup with PCP to discuss resuming Xarelto     HTN  - Continue home meds norvasc, hydralazine, and lopressor    "  Hx of CAD  - Per SNF paperwork, full history unclear  - Pt. Denies any active chest pain, no ST changes compared to prior EKG  -Troponin mildly elevated at 0.092, but this appears to be chronic and is not surprising in setting of demand from hypoxia.   - Low-suspicion for ACS, but will continue to trend troponin.   - TTE ordered given the patient's reported cardiac history and acitve hypoxia without a baseline/recent TTE in system  -Continue statin        Advance Care Planning  Goals of care, counseling/discussion: Full code   Advance Care Planning     If patient transitions to Comfort-Focused Care, please place "Nurse Communication: End of Life Care, family members allowed to visit, including spouse/partner and adult children [please list names]. Please ask family to visit as a group and leave as a group.       VTE High Risk Prophylaxis:   VTE Risk Mitigation (From admission, onward)         Ordered     IP VTE HIGH RISK PATIENT  Once      08/28/20 2223     Place sequential compression device  Until discontinued      08/28/20 2223                  Subsequent Inpatient Hospital Care    Level 3 [id number] Total visit time was 35 minutes or greater with greater than 50% of time spent in counseling and coordination of care.         High Risk Conditions:  Patient has a condition that poses threat to life and bodily function: Severe Respiratory Distress        Surinder Schrader MD  Hospital Medicine Staff  Pager: [phone]; Spectra: [phone]          "

## 2020-08-29 NOTE — EKG INTERPRETATIONS - EMERGENCY DEPT.
Independently interpreted by MD:  Rate 68, NSR, no stemi, no ectopy, no hypertrophy, flipped T waves lat

## 2020-08-29 NOTE — PT/OT/SLP EVAL
Occupational Therapy   Evaluation    Name: Melvi Conrad  MRN: [id number]  Admitting Diagnosis:  Acute hypoxemic respiratory failure      Recommendations:     Discharge Recommendations: nursing facility, skilled(return to SNF)  Discharge Equipment Recommendations:  other (see comments)(tbd pending progression)  Barriers to discharge:  Inaccessible home environment    Assessment:     Melvi Conrad is a 74 y.o. female with a medical diagnosis of Acute hypoxemic respiratory failure. Pt presents with decreased endurance and impaired mobility performance as limited by cardiovascular status and generalized weakness. Pt very pleasant and willing to participate with OT/PT this morning. Pt explains she was admitted from [facility] where she was receiving PT services. Prior to this, pt lived in a  home with her grandson and grandson's wife. at baseline, pt was (I) with ADLs and mobilized with mod (I) using her rollator. during evaluation, pt required SBA for bed mobility and SBA to t/f to BSC using no AD. Pt explain her family works during the day and will not be able to assist her at time of d/c. Pt on 4L HFNC and desat ~80s following mobility with RN present. Following pursed lip breathing, pt returned to WNL with Sp02 levels. Pt voiced appreciation of therapy and voiced understanding of POC. Pt is a high fall risk at this time and is an excellent candidate for continued therapy. Performance deficits affecting function: weakness, impaired self care skills, impaired balance, impaired endurance, impaired functional mobilty, gait instability, decreased lower extremity function, decreased upper extremity function, impaired cardiopulmonary response to activity.  Pt would benefit from continued OT skilled services 4x/wk to improve daily living skills to optimize QOL. Pt is recommended to discharge to SNF at this time.      Rehab Prognosis: Good; patient would benefit from acute skilled OT services to address these deficits and  "reach maximum level of function.       Plan:     Patient to be seen 4 x/week to address the above listed problems via self-care/home management, therapeutic activities, therapeutic exercises  · Plan of Care Expires: 09/29/20  · Plan of Care Reviewed with: patient    Subjective     Chief Complaint: "I don't know what they were doing at [facility]. It seemed like my breathing was getting worse and worse, then I couldn't do much in therapy"  Patient/Family Comments/goals: "I was doing everything for myself I would love to be at that point again"    Occupational Profile:  Living Environment: pt lives with her grandson and grandson's wife in a SSH with no FRANKI. Pt uses a shower chair for bathing in a s/t combination.  Previous level of function: Previously at [facility] ([facility]) following d/c from [facility]. Prior to this, pt was (I) with ADLs and mobilizing with a rollator.   Roles and Routines: Pt enjoys going to the [facility].   Equipment Used at Home:  rollator, shower chair  Assistance upon Discharge: Family works; unable to assist     Pain/Comfort:  · Pain Rating 1: 0/10  · Pain Rating Post-Intervention 2: 0/10    Patients cultural, spiritual, Zoroastrian conflicts given the current situation: no    Objective:     Communicated with: RN prior to session.  Patient found HOB elevated with blood pressure cuff, pulse ox (continuous), telemetry, oxygen upon OT entry to room.    General Precautions: Standard, airborne, droplet, fall, contact   Orthopedic Precautions:N/A   Braces: N/A     Occupational Performance:    Bed Mobility:    · Patient completed Rolling/Turning to Right with stand by assistance  · Patient completed Scooting/Bridging with stand by assistance  · Patient completed Supine to Sit with stand by assistance    Functional Mobility/Transfers:  · Patient completed Sit <> Stand Transfer with stand by assistance  with  no assistive device   · Patient completed Bed <> Chair Transfer using Step Transfer technique with stand by " assistance with no assistive device  · Patient completed Toilet Transfer Step Transfer technique with stand by assistance with  no AD  · Functional Mobility: Pt completed stand with ~2 steps to BSC for toileting, followed by stand pivot to bedside chair with SBA. Pt on 4L and desat to ~80s following mobility.     Activities of Daily Living:  · Grooming: setup (A) for towelettes following toileting to wash hands   · Upper Body Dressing: minimum assistance adjusting gown fit at EOB   · Toileting: stand by assistance with pt using BSC for urination    Cognitive/Visual Perceptual:  Cognitive/Psychosocial Skills:     -       Oriented to: Person, Place, Time and Situation   -       Follows Commands/attention:Follows multistep  commands  -       Communication: clear/fluent  -       Memory: No Deficits noted  -       Safety awareness/insight to disability: intact   -       Mood/Affect/Coping skills/emotional control: Appropriate to situation    Physical Exam:  Balance:    -       demo good sitting balance and fair standing balance   Upper Extremity Range of Motion:     -       Right Upper Extremity: WNL  -       Left Upper Extremity: WNL  Upper Extremity Strength:    -       Right Upper Extremity: WNL  -       Left Upper Extremity: WNL   Strength:    -       Right Upper Extremity: WNL  -       Left Upper Extremity: WNL    AMPAC 6 Click ADL:  AMPAC Total Score: 18    Treatment & Education:  Pt educated on role of occupational therapy, POC, and safety during ADLs and functional mobility. Pt and OT discussed importance of safe, continued mobility to optimize daily living skills. Pt verbalized understanding. White board updated during session. Pt given instruction to call for medical staff/nurse for assistance.     Education:    Patient left up in chair with all lines intact, call button in reach and RN Ted present    GOALS:   Multidisciplinary Problems     Occupational Therapy Goals        Problem: Occupational Therapy  Goal    Goal Priority Disciplines Outcome Interventions   Occupational Therapy Goal     OT, PT/OT Ongoing, Progressing    Description: Goals to be met by: 9/29/2020     Patient will increase functional independence with ADLs by performing:    UE Dressing with Woods.  LE Dressing with Woods.  Grooming while standing with Stand-by Assistance.  Toileting from toilet with Stand-by Assistance for hygiene and clothing management.   Rolling to Bilateral with Woods.   Supine to sit with Woods.  Step transfer with Stand-by Assistance  Toilet transfer to toilet with Stand-by Assistance.  Pt to demonstrate pursed lip breathing for ADL tasks to reflect Sp02 WNL.  Pt to complete ADL tasks with <1 rest break for cardiovascular needs.                   History:     Past Medical History:   Diagnosis Date    COPD (chronic obstructive pulmonary disease)     Hypertension     Renal disorder     Seizures        History reviewed. No pertinent surgical history.    Time Tracking:     OT Date of Treatment: 08/29/20  OT Start Time: 0855  OT Stop Time: 0915  OT Total Time (min): 20 min    Billable Minutes:Evaluation 10 min  Self Care/Home Management 10 min    Rashmi Calixto OT  8/29/2020

## 2020-08-29 NOTE — PT/OT/SLP EVAL
Physical Therapy Evaluation    Patient Name:  Melvi Conrad   MRN:  [id number]    Recommendations:     Discharge Recommendations:  nursing facility, skilled   Discharge Equipment Recommendations: (TBD)   Barriers to discharge: decreased functional mobility requiring increased assist      Assessment:     Melvi Conrad is a 74 y.o. female admitted with a medical diagnosis of Acute hypoxemic respiratory failure.  She presents with the following impairments/functional limitations:  weakness, impaired endurance, impaired self care skills, impaired functional mobilty, decreased lower extremity function, gait instability, impaired balance, impaired cardiopulmonary response to activity. Pt evaluated on this date demonstrating impairments in functional mobility requiring increased assist. Pt recently at [facility] reporting positive results. Pt required SBA/CGA for all mobility on this date primarily limited by decreased cardiopulmonary response to activity. Pt on 4L O2 throughout this session w/[id number] to low 80's following transfer training. Pt would benefit from continued skilled acute PT 3x/wk to improve functional mobility.  Recommending pt receive PT services in SNF setting following discharge from hospital once medically cleared.     Rehab Prognosis: Good; patient would benefit from acute skilled PT services to address these deficits and reach maximum level of function.    Recent Surgery: * No surgery found *      Plan:     During this hospitalization, patient to be seen 3 x/week to address the identified rehab impairments via gait training, therapeutic activities, therapeutic exercises, neuromuscular re-education and progress toward the following goals:    · Plan of Care Expires:  09/28/20    Subjective     Chief Complaint: ARENAS  Patient/Family Comments/goals: Pt pleasant and willing to participate with therapy on this date.    Pain/Comfort:  · Pain Rating 1: 0/10    Patients cultural, spiritual,  Yazdanism conflicts given the current situation: no    Living Environment:  Pt lives w/grandson and grandson's wife in [facility] w/1 FRANKI.   Prior to admission, patients level of function was reported (I) w/ADLs, (I) amb household distances, and Mod I amb community distances w/Rollator.  Equipment used at home: rollator, shower chair.  DME owned (not currently used): none.  Upon discharge, patient will have assistance from family.    Objective:     Communicated with NSG prior to session.  Patient found HOB elevated with blood pressure cuff, pulse ox (continuous), telemetry, oxygen  upon PT entry to room.    General Precautions: Standard, airborne, contact, droplet, fall   Orthopedic Precautions:N/A   Braces: N/A     Exams:  · RLE ROM: WFL  · RLE Strength: WFL  · LLE ROM: WFL  · LLE Strength: WFL    Functional Mobility:  · Bed Mobility:     · Supine to Sit: stand by assistance  · Transfers:     · Sit to Stand:  contact guard assistance with hand-held assist  · Bed to Chair: contact guard assistance with  hand-held assist  using  Step Transfer  · Gait: 3ft CGA w/HHA. SpO2 decreased to low 80's while on 4L O2  · Balance: Sitting: SBA     Standing: CGA    Therapeutic Activities and Exercises:   - Sit-to-Stand: x3 trials CGA w/HHA  - Transfer Training x2 trials CGA w/HHA using step transfer  - Pt educated on:   -PT roles, expectations, and POC    -Safety with mobility   -Benefits of OOB activities to increase strength and functional mobility    -Performing ther ex for increasing LE ROM and strength   -Discharge recommendations     AM-PAC 6 CLICK MOBILITY  Total Score:17     Patient left up in chair with all lines intact, call button in reach and NSG notified.    GOALS:   Multidisciplinary Problems     Physical Therapy Goals        Problem: Physical Therapy Goal    Goal Priority Disciplines Outcome Goal Variances Interventions   Physical Therapy Goal     PT, PT/OT Ongoing, Progressing     Description: Goals to be met by:  2020    Patient will increase functional independence with mobility by performin. Supine to sit with Modified [place]  2. Sit to supine with Modified [place]  3. Sit to stand transfer with Modified [place]  4. Gait  x 20 feet with Stand-by Assistance using LRAD  5. Lower extremity exercise program x15 reps, with independence                      History:     Past Medical History:   Diagnosis Date    COPD (chronic obstructive pulmonary disease)     Hypertension     Renal disorder     Seizures        History reviewed. No pertinent surgical history.    Time Tracking:     PT Received On: 20  PT Start Time: 854     PT Stop Time: 917  PT Total Time (min): 23 min     Billable Minutes: Evaluation 10 and Therapeutic Activity 13      Jj Resendez, PT  2020

## 2020-08-29 NOTE — PLAN OF CARE
Problem: Occupational Therapy Goal  Goal: Occupational Therapy Goal  Description: Goals to be met by: 9/29/2020     Patient will increase functional independence with ADLs by performing:    UE Dressing with Coram.  LE Dressing with Coram.  Grooming while standing with Stand-by Assistance.  Toileting from toilet with Stand-by Assistance for hygiene and clothing management.   Rolling to Bilateral with Coram.   Supine to sit with [place].  Step transfer with Stand-by Assistance  Toilet transfer to toilet with Stand-by Assistance.  Pt to demonstrate pursed lip breathing for ADL tasks to reflect Sp02 WNL.  Pt to complete ADL tasks with <1 rest break for cardiovascular needs.  Evaluated pt and established OT POC. Continue OT as tolerated.  Rashmi Calixto OT  8/29/2020    Outcome: Ongoing, Progressing

## 2020-08-29 NOTE — ED PROVIDER NOTES
Encounter Date: 8/28/2020       History     Chief Complaint   Patient presents with    COVID-19 Concerns     Diagnosed with covid on 08/21, called EMS for increasing shortness of breath today. Hx COPD.    Shortness of Breath     HPI   Pt is a 73 YO F w/ hx of renal agenesis, CKD3, Aortic stenosis s/p valve replacement, HTN, epilepsy, COPD, COVID 19 presenting w/ c/o acute worsening of SOB. PT reports she was diagnosed w/ COVID x10 days ago at . Pt was discharged to  subacute rehab, has been stable w/ 2L NC, today required 3L and had worsening dyspnea. Denies cough, fever. Has had intermittent nausea w/o vomiting or diarrhea, Denies CP.     Review of patient's allergies indicates:   Allergen Reactions    Nexium [esomeprazole magnesium] Hives    Pcn [penicillins] Hives     Past Medical History:   Diagnosis Date    COPD (chronic obstructive pulmonary disease)     Hypertension     Renal disorder     Seizures      History reviewed. No pertinent surgical history.  History reviewed. No pertinent family history.  Social History     Tobacco Use    Smoking status: Current Every Day Smoker     Packs/day: 1.00     Start date: 7/29/1961    Smokeless tobacco: Never Used   Substance Use Topics    Alcohol use: No    Drug use: No     Review of Systems   Constitutional: Negative for chills and fever.   HENT: Negative for congestion and sore throat.    Eyes: Negative for photophobia and visual disturbance.   Respiratory: Positive for shortness of breath. Negative for cough and wheezing.    Cardiovascular: Negative for chest pain.   Gastrointestinal: Positive for nausea. Negative for abdominal distention, diarrhea and vomiting.   Genitourinary: Negative for dysuria and hematuria.   Musculoskeletal: Negative for gait problem and neck pain.   Skin: Negative for rash and wound.   Neurological: Negative for syncope and headaches.   Psychiatric/Behavioral: Negative for agitation and confusion.       Physical Exam     Initial  Vitals [08/28/20 1841]   BP Pulse Resp Temp SpO2   (!) 124/91 73 (!) 26 98.9 °F (37.2 °C) 97 %      MAP       --         Physical Exam    Nursing note and vitals reviewed.  Constitutional: She appears well-developed and well-nourished. No distress.   HENT:   Head: Normocephalic and atraumatic.   Eyes: EOM are normal. Pupils are equal, round, and reactive to light.   Neck: Normal range of motion. Neck supple.   Cardiovascular: Normal rate, regular rhythm and intact distal pulses.   Pulmonary/Chest: Accessory muscle usage present. Tachypnea noted. No respiratory distress. She has decreased breath sounds in the right lower field and the left lower field. She has no wheezes.   Abdominal: Soft. She exhibits no distension. There is no abdominal tenderness. There is no guarding.   Musculoskeletal: Normal range of motion. No tenderness or edema.   Neurological: She is alert and oriented to person, place, and time. GCS eye subscore is 4. GCS verbal subscore is 5. GCS motor subscore is 6.   Skin: Skin is warm and dry. Capillary refill takes less than 2 seconds.         ED Course   Procedures  Labs Reviewed   CBC W/ AUTO DIFFERENTIAL - Abnormal; Notable for the following components:       Result Value    RBC 2.10 (*)     Hemoglobin 7.0 (*)     Hematocrit 22.4 (*)     Mean Corpuscular Volume 107 (*)     Mean Corpuscular Hemoglobin 33.3 (*)     Mean Corpuscular Hemoglobin Conc 31.3 (*)     RDW 16.1 (*)     Immature Granulocytes 1.0 (*)     Immature Grans (Abs) 0.07 (*)     Eos # 0.6 (*)     nRBC 1 (*)     Lymph% 17.3 (*)     Eosinophil% 8.7 (*)     All other components within normal limits   COMPREHENSIVE METABOLIC PANEL - Abnormal; Notable for the following components:    Glucose 124 (*)     Calcium 8.5 (*)     Albumin 2.6 (*)     All other components within normal limits   C-REACTIVE PROTEIN - Abnormal; Notable for the following components:    .0 (*)     All other components within normal limits   FERRITIN - Abnormal;  Notable for the following components:    Ferritin 353 (*)     All other components within normal limits   LACTATE DEHYDROGENASE - Abnormal; Notable for the following components:     (*)     All other components within normal limits   TROPONIN I - Abnormal; Notable for the following components:    Troponin I 0.092 (*)     All other components within normal limits   CK   LACTIC ACID, PLASMA          Imaging Results           X-Ray Chest AP Portable (Final result)  Result time 08/28/20 19:20:46    Final result by Howard Edward MD (08/28/20 19:20:46)                 Impression:      Bilateral nonspecific pulmonary opacities.  Differential considerations can include sequela of pulmonary edema/CHF pattern with left greater than right small pleural effusions, versus inflammatory or interstitial pneumonia from atypical or viral etiology including COVID-19, versus progression chronic interstitial lung disease.  Further evaluation/follow-up as warranted.    This report was flagged in Epic as abnormal.      Electronically signed by: Howard Edward MD  Date:    08/28/2020  Time:    19:20             Narrative:    EXAMINATION:  XR CHEST AP PORTABLE    CLINICAL HISTORY:  Suspected Covid-19 Virus Infection;    TECHNIQUE:  Single frontal view of the chest was performed.    COMPARISON:  Chest radiograph 07/28/2014    FINDINGS:  Resolution is limited by body habitus with underpenetration.    Cardiac silhouette is moderately enlarged which could be related to cardiomegaly and/or pericardial fluid.  Upper mediastinal and hilar contours are within normal limits.  Pulmonary vasculature is within normal limits.    There is bilateral diffuse nonspecific interstitial coarsening with a somewhat peripheral and basilar distribution increased from previous exam.  There is increased hazy opacification of the bilateral lung bases with poor visualization of the costophrenic angles, left greater than right.  Additional scattered linear  opacities throughout both lungs consistent with platelike scarring versus atelectasis.  No large pneumothorax.  No acute osseous process seen.  PA and lateral views can be obtained.                                               Resident MDM PGY-4  Melvi Conrad is a 74 y.o. YO female presenting w/ c/o COVID w/ Dyspnea x 2 days, pt w/ new increase in O2 requirement. VSS, Afebrile, non toxic appearing.   Differentials include Worsening PNA, anemia, pulmonary edema, PE, COVID 19, ACS  Labs W/ elevated inflammatory markers, hgb 7.0 decreased from prior 7/30 13.6, Trop elevated 0.092  Cxr w/ changes consistent w/ COVID. EKG w/ NSR no ST changes. Given ASA, Albuterol w/ spacer PRN SOB. Will send type and screen. Rectal exam w/ yellow stool, occult negative. Pt has been on blood thinners at rehab center, will need further evaluation. Discussed admission w/ hospitalist. Will continue to monitor and reevaluate for clinical changes.     Koki Mehta MD MPH  [employer] Emergency [facility] PGY-4  8:29 PM 8/28/2020       ED Course as of Aug 28 2051   Fri Aug 28, 2020   2047 Troponin I(!): 0.092 [BW]   2047 CRP(!): 101.0 [BW]   2047 Ferritin(!): 353 [BW]   2047 Hemoglobin(!): 7.0 [BW]   2047 LD(!): 432 [BW]   2047 Glucose(!): 124 [BW]      ED Course User Index  [BW] Koki Mehta MD                Clinical Impression:       ICD-10-CM ICD-9-CM   1. COVID-19  U07.1    2. Suspected Covid-19 Virus Infection  R68.89    3. Hypoxia  R09.02 799.02   4. SOB (shortness of breath)  R06.02 786.05   5. Troponin level elevated  R79.89 790.6             ED Disposition Condition    Admit                           Koki Mehta MD  Resident  08/28/20 2052       Koki Mehta MD  Resident  08/28/20 2105       Koki Mehta MD  Resident  08/28/20 2115

## 2020-08-29 NOTE — H&P
Hospital Medicine  History and Physical Exam    Team: [facility] MED  Lan Cerrato MD  Admit Date: 8/28/2020  Principal Problem:  Acute hypoxemic respiratory failure   Patient information was obtained from patient, past medical records and ER records.   Primary care Physician: Andrae Jj MD  Code status: Full Code    HPI: 73 yo F with PMHx HTN, CKD3, COPD on 2L NC, epilepsy, and CAD presents to the hospital from [facility] with worsening SOB 1 week after being discharged from [facility] for COVID-19 pneumonia. The patient reports that over the last 2 days at the [facility], she has developed a progressive decline in her breathing. She reports that she was unable to participate much with therapy today and yesterday 2/2 SOB. She reports that she had been unable to receive her nebulizer treatments while in the SNF due to concerns of aerosolization of the virus, and she believes this significantly affected her recovery. She denies any chest pain, lightheadedness, palpitations, fevers, chills, or new cough.      Per son, the pateint was admitted to [facility] for symptoms of difficulty breathing, coughing, and fatigue. She tested positive for COVID-19 on admission and was admitted from 8/5- 8/21. Pt. And son reports that she spent the majority of her stay in the ICU, but she was never intubated. She received remdesivir, steroids, and convalescent plasma per son. Pt. Was also reportedly started on xarelto, but the reasoning is unclear. The son and pt. Deny that she ever had a blood clot. They states that she was put on the medication to prevent blood clots in the setting of COVID-19 infection. She was improving at time of discharged, but did require 2L NC at time of discharge which she had been on at [facility] until today.        Hemoglobin A1C   Date Value Ref Range Status   07/29/2014 5.8 4.5 - 6.2 % Final   10/17/2013 6.0 4.5 - 6.2 % Final       Past Medical History: Patient has a past medical history of  COPD (chronic obstructive pulmonary disease), Hypertension, Renal disorder, and Seizures.    Past Surgical History: Patient has no past surgical history on file.    Social History: Patient reports that she has been smoking. She started smoking about 59 years ago. She has been smoking about 1.00 pack per day. She has never used smokeless tobacco. She reports that she does not drink alcohol or use drugs.    Family History: family history is not on file.    Medications: reviewed     Allergies: Patient is allergic to nexium [esomeprazole magnesium] and pcn [penicillins].    ROS  Pain Scale: 0 /10   Constitutional: no fever or chills  Respiratory: Positive for cough and SOB  Cardiovascular: no chest pain or palpitations  Gastrointestinal: no nausea or vomiting, no abdominal pain or change in bowel habits  Genitourinary: no hematuria or dysuria  Integument/Breast: no rash or pruritis  Hematologic/Lymphatic: no easy bruising or lymphadenopathy  Musculoskeletal: no arthralgias or myalgias  Neurological: no seizures or tremors  Behavioral/Psych: no depression or anxiety    PEx  Temp:  [98.9 °F (37.2 °C)]   Pulse:  [66-73]   Resp:  [26]   BP: (124-162)/(70-91)   SpO2:  [97 %-100 %]   Body mass index is 31.09 kg/m².   No intake or output data in the 24 hours ending 08/28/20 2049    General appearance: Pt. With rapid shallow breathing  Mental status: Alert and oriented x 3  HEENT:  conjunctivae/corneas clear, PERRL  Neck: supple, thyroid not enlarged  Pulm:  Decreased breath sounds B/L  Card: RRR, S1, S2 normal, II/VI systolic ejection murmur  Abd: soft, NT, ND, BS present; no masses, no organomegaly  Ext: no c/c/e  Pulses: 2+, symmetric  Skin: color, texture, turgor normal. No rashes or lesions  Neuro: CN II-XII grossly intact, no focal numbness or weakness, normal strength and tone     Recent Results (from the past 24 hour(s))   CBC auto differential    Collection Time: 08/28/20  7:39 PM   Result Value Ref Range    WBC 7.05  3.90 - 12.70 K/uL    RBC 2.10 (L) 4.00 - 5.40 M/uL    Hemoglobin 7.0 (L) 12.0 - 16.0 g/dL    Hematocrit 22.4 (L) 37.0 - 48.5 %    Mean Corpuscular Volume 107 (H) 82 - 98 fL    Mean Corpuscular Hemoglobin 33.3 (H) 27.0 - 31.0 pg    Mean Corpuscular Hemoglobin Conc 31.3 (L) 32.0 - 36.0 g/dL    RDW 16.1 (H) 11.5 - 14.5 %    Platelets 197 150 - 350 K/uL    MPV 11.1 9.2 - 12.9 fL    Immature Granulocytes 1.0 (H) 0.0 - 0.5 %    Gran # (ANC) 4.3 1.8 - 7.7 K/uL    Immature Grans (Abs) 0.07 (H) 0.00 - 0.04 K/uL    Lymph # 1.2 1.0 - 4.8 K/uL    Mono # 0.8 0.3 - 1.0 K/uL    Eos # 0.6 (H) 0.0 - 0.5 K/uL    Baso # 0.03 0.00 - 0.20 K/uL    nRBC 1 (A) 0 /100 WBC    Gran% 60.8 38.0 - 73.0 %    Lymph% 17.3 (L) 18.0 - 48.0 %    Mono% 11.8 4.0 - 15.0 %    Eosinophil% 8.7 (H) 0.0 - 8.0 %    Basophil% 0.4 0.0 - 1.9 %    Differential Method Automated    Comprehensive metabolic panel    Collection Time: 08/28/20  7:39 PM   Result Value Ref Range    Sodium 137 136 - 145 mmol/L    Potassium 4.4 3.5 - 5.1 mmol/L    Chloride 101 95 - 110 mmol/L    CO2 28 23 - 29 mmol/L    Glucose 124 (H) 70 - 110 mg/dL    BUN, Bld 13 8 - 23 mg/dL    Creatinine 0.8 0.5 - 1.4 mg/dL    Calcium 8.5 (L) 8.7 - 10.5 mg/dL    Total Protein 6.8 6.0 - 8.4 g/dL    Albumin 2.6 (L) 3.5 - 5.2 g/dL    Total Bilirubin 0.7 0.1 - 1.0 mg/dL    Alkaline Phosphatase 70 55 - 135 U/L    AST 21 10 - 40 U/L    ALT 20 10 - 44 U/L    Anion Gap 8 8 - 16 mmol/L    eGFR if African American >60.0 >60 mL/min/1.73 m^2    eGFR if non African American >60.0 >60 mL/min/1.73 m^2   C-Reactive Protein    Collection Time: 08/28/20  7:39 PM   Result Value Ref Range    .0 (H) 0.0 - 8.2 mg/L   Ferritin    Collection Time: 08/28/20  7:39 PM   Result Value Ref Range    Ferritin 353 (H) 20.0 - 300.0 ng/mL   Lactate Dehydrogenase    Collection Time: 08/28/20  7:39 PM   Result Value Ref Range     (H) 110 - 260 U/L   CK    Collection Time: 08/28/20  7:39 PM   Result Value Ref Range    CPK 34 20  - 180 U/L   Lactic Acid, Plasma    Collection Time: 08/28/20  7:39 PM   Result Value Ref Range    Lactate (Lactic Acid) 1.3 0.5 - 2.2 mmol/L   Troponin I    Collection Time: 08/28/20  7:39 PM   Result Value Ref Range    Troponin I 0.092 (H) 0.000 - 0.026 ng/mL       No results for input(s): POCTGLUCOSE in the last 168 hours.    Active Hospital Problems    Diagnosis  POA    COVID-19 [U07.1]  Yes      Resolved Hospital Problems   No resolved problems to display.         Assessment and Plan:  COPD Exacerbation  Acute Hypoxemic Respiratory Failure  -Pt. With acute worsening of SOB a week after being admitted for a prolonged stay at [facility]  -Decreased breath sounds noted with wheezing. Pt. Also reporting that she was unable to receive her nebulizer while at the [facility] which have likely contributed to her worsening SOB  -CTA chest ordered in ED pending, but believe likelihood of PE is low given that the pt. Was on xarelto prior to presentation  -In meantime, treat as COPD exacerbation with steroids, scheduled nebulizer and azithromycin  -Monitor for improvement and wean O2 as tolerated      COVID-19  - COVID-19 testing was reportedly POSITIVE on admit 8/28, but pt. Was reportedly positive at [facility] 8/5. She has already received remdesivir, convalescent plasma, steroids, and abx for treatment per son. Will need to obtain records from [facility], but suspect pt. To be having complications of inflammation from the disease rather than a new active infection. Treat as COPD exacerbation as above.  Further care outlined as below    - Infection Control notified   - Isolation:   - Airborne, Contact and Droplet Precautions  - Cohort patients into COVID units  - N95 mask, wear eye protection  - 20 second hand hygiene              - Limit visitors per hospital policy              - Consolidating lab draws, nursing care, provider visits, and interventions    - Diagnostics: (leukopenia, hyponatremia, hyperferritinemia, elevated troponin, elevated  d-dimer, age, and comorbidities are significant predictors of poor clinical outcome)  CBC, CMP, Procalcitonin, Ferritin, CRP, LDH, BNP, Troponin, Blood Culture x2, Portable CXR and UA and culture    - Management:  Supplemental O2 to maintain SpO2 >92%  Telemetry  Continuous/intermittent Pulse Ox  Albuterol treatment PRN  Careful use of steroids in the absence of other indications - unless septic shock due to increased viral replication Fluid sparing resuscitation and Empiric antibiotics per likely source & patient allergies if patient hypoxic with airspace disease as 1 time doses CAP: azithromycin & ceftriaxone    Advance Care Planning     Code Status  FULL    Anemia  -Hgb 7.0 on presentation, baseline unclear. Pt. Denies any melena or hematochezia  -Pt. Recently started On xarelto per SNF paperwork, but no diagnosis of blood clot listed and pt. Denies ever having one  -CTA ordered to rule out PE, will hold xarelto in meantime given the anemia  -Continue home B12 supplementation  -Monitor CBC and transfuse to goal >7.     HTN  -Continue home meds norvasc, hydralazine, and lopressor     Hx of CAD  -Per SNF paperwork, full history unclear  -Pt. Denies any active chest pain, no ST changes compared to prior EKG  -Troponin mildly elevated at 0.092, but this appears to be chronic and is not surprising in setting of demand from hypoxia. Low-suspicion for ACS, but will continue to trend troponin. TTE ordered given the patient's reported cardiac history and acitve hypoxia without a baseline/recent TTE in system  -Continue statin    DVT PPx: SCDs    Lan Cerrato MD  Hospital Medicine Staff  [phone] pager

## 2020-08-29 NOTE — PLAN OF CARE
Eval completed and POC established     Fred Resendez, PT, DPT  2020     Problem: Physical Therapy Goal  Goal: Physical Therapy Goal  Description: Goals to be met by: 2020    Patient will increase functional independence with mobility by performin. Supine to sit with Modified [place]  2. Sit to supine with Modified [place]  3. Sit to stand transfer with Modified [place]  4. Gait  x 20 feet with Stand-by Assistance using LRAD  5. Lower extremity exercise program x15 reps, with independence     Outcome: Ongoing, Progressing

## 2020-08-30 LAB
ALBUMIN SERPL BCP-MCNC: 2.7 G/DL (ref 3.5–5.2)
ALP SERPL-CCNC: 63 U/L (ref 55–135)
ALT SERPL W/O P-5'-P-CCNC: 20 U/L (ref 10–44)
ANION GAP SERPL CALC-SCNC: 10 MMOL/L (ref 8–16)
ASCENDING AORTA: 2.16 CM
AST SERPL-CCNC: 28 U/L (ref 10–40)
AV INDEX (PROSTH): 0.29
AV MEAN GRADIENT: 27 MMHG
AV PEAK GRADIENT: 50 MMHG
AV VALVE AREA: 1.02 CM2
AV VELOCITY RATIO: 0.31
BILIRUB SERPL-MCNC: 0.4 MG/DL (ref 0.1–1)
BSA FOR ECHO PROCEDURE: 1.84 M2
BUN SERPL-MCNC: 19 MG/DL (ref 8–23)
CALCIUM SERPL-MCNC: 8.9 MG/DL (ref 8.7–10.5)
CHLORIDE SERPL-SCNC: 102 MMOL/L (ref 95–110)
CO2 SERPL-SCNC: 27 MMOL/L (ref 23–29)
CREAT SERPL-MCNC: 1 MG/DL (ref 0.5–1.4)
CV ECHO LV RWT: 0.37 CM
DOP CALC AO PEAK VEL: 3.55 M/S
DOP CALC AO VTI: 75 CM
DOP CALC LVOT AREA: 3.5 CM2
DOP CALC LVOT DIAMETER: 2.1 CM
DOP CALC LVOT PEAK VEL: 1.09 M/S
DOP CALC LVOT STROKE VOLUME: 76.16 CM3
DOP CALCLVOT PEAK VEL VTI: 22 CM
E WAVE DECELERATION TIME: 203.13 MSEC
E/A RATIO: 1.07
E/E' RATIO: 33.33 M/S
ECHO LV POSTERIOR WALL: 0.72 CM (ref 0.6–1.1)
EST. GFR  (AFRICAN AMERICAN): >60 ML/MIN/1.73 M^2
EST. GFR  (NON AFRICAN AMERICAN): 55.6 ML/MIN/1.73 M^2
FRACTIONAL SHORTENING: 26 % (ref 28–44)
GLUCOSE SERPL-MCNC: 122 MG/DL (ref 70–110)
INTERVENTRICULAR SEPTUM: 0.65 CM (ref 0.6–1.1)
IVRT: 59.94 MSEC
LA MAJOR: 6.11 CM
LA MINOR: 6.09 CM
LA WIDTH: 4.38 CM
LEFT ATRIUM SIZE: 2.99 CM
LEFT ATRIUM VOLUME INDEX: 38.6 ML/M2
LEFT ATRIUM VOLUME: 67.9 CM3
LEFT INTERNAL DIMENSION IN SYSTOLE: 2.86 CM (ref 2.1–4)
LEFT VENTRICLE DIASTOLIC VOLUME INDEX: 36.56 ML/M2
LEFT VENTRICLE DIASTOLIC VOLUME: 64.31 ML
LEFT VENTRICLE MASS INDEX: 41 G/M2
LEFT VENTRICLE SYSTOLIC VOLUME INDEX: 17.8 ML/M2
LEFT VENTRICLE SYSTOLIC VOLUME: 31.24 ML
LEFT VENTRICULAR INTERNAL DIMENSION IN DIASTOLE: 3.86 CM (ref 3.5–6)
LEFT VENTRICULAR MASS: 71.77 G
LV LATERAL E/E' RATIO: 30 M/S
LV SEPTAL E/E' RATIO: 37.5 M/S
MV PEAK A VEL: 1.4 M/S
MV PEAK E VEL: 1.5 M/S
MV STENOSIS PRESSURE HALF TIME: 58.91 MS
MV VALVE AREA P 1/2 METHOD: 3.73 CM2
PISA TR MAX VEL: 2.93 M/S
POTASSIUM SERPL-SCNC: 4.6 MMOL/L (ref 3.5–5.1)
PROT SERPL-MCNC: 7.3 G/DL (ref 6–8.4)
RA MAJOR: 4.87 CM
RA PRESSURE: 8 MMHG
RA WIDTH: 3.22 CM
RIGHT VENTRICULAR END-DIASTOLIC DIMENSION: 2.6 CM
RV TISSUE DOPPLER FREE WALL SYSTOLIC VELOCITY 1 (APICAL 4 CHAMBER VIEW): 12.21 CM/S
SINUS: 2.45 CM
SODIUM SERPL-SCNC: 139 MMOL/L (ref 136–145)
STJ: 1.88 CM
TDI LATERAL: 0.05 M/S
TDI SEPTAL: 0.04 M/S
TDI: 0.05 M/S
TR MAX PG: 34 MMHG
TRICUSPID ANNULAR PLANE SYSTOLIC EXCURSION: 2.05 CM
TV REST PULMONARY ARTERY PRESSURE: 42 MMHG

## 2020-08-30 PROCEDURE — 80053 COMPREHEN METABOLIC PANEL: CPT

## 2020-08-30 PROCEDURE — 94640 AIRWAY INHALATION TREATMENT: CPT

## 2020-08-30 PROCEDURE — 63600175 PHARM REV CODE 636 W HCPCS: Performed by: HOSPITALIST

## 2020-08-30 PROCEDURE — 25000003 PHARM REV CODE 250: Performed by: HOSPITALIST

## 2020-08-30 PROCEDURE — 36415 COLL VENOUS BLD VENIPUNCTURE: CPT

## 2020-08-30 PROCEDURE — 25000242 PHARM REV CODE 250 ALT 637 W/ HCPCS: Performed by: INTERNAL MEDICINE

## 2020-08-30 PROCEDURE — 11000001 HC ACUTE MED/SURG PRIVATE ROOM

## 2020-08-30 PROCEDURE — 63700000 PHARM REV CODE 250 ALT 637 W/O HCPCS: Performed by: INTERNAL MEDICINE

## 2020-08-30 PROCEDURE — 27000221 HC OXYGEN, UP TO 24 HOURS

## 2020-08-30 PROCEDURE — 25000242 PHARM REV CODE 250 ALT 637 W/ HCPCS: Performed by: HOSPITALIST

## 2020-08-30 PROCEDURE — 99233 SBSQ HOSP IP/OBS HIGH 50: CPT | Mod: ,,, | Performed by: INTERNAL MEDICINE

## 2020-08-30 PROCEDURE — 99233 PR SUBSEQUENT HOSPITAL CARE,LEVL III: ICD-10-PCS | Mod: ,,, | Performed by: INTERNAL MEDICINE

## 2020-08-30 PROCEDURE — 25000003 PHARM REV CODE 250: Performed by: INTERNAL MEDICINE

## 2020-08-30 PROCEDURE — 94761 N-INVAS EAR/PLS OXIMETRY MLT: CPT

## 2020-08-30 RX ORDER — PROMETHAZINE HYDROCHLORIDE AND CODEINE PHOSPHATE 6.25; 1 MG/5ML; MG/5ML
5 SOLUTION ORAL EVERY 4 HOURS PRN
Status: DISCONTINUED | OUTPATIENT
Start: 2020-08-30 | End: 2020-09-09 | Stop reason: HOSPADM

## 2020-08-30 RX ORDER — AZITHROMYCIN 250 MG/1
500 TABLET, FILM COATED ORAL ONCE
Status: COMPLETED | OUTPATIENT
Start: 2020-08-30 | End: 2020-08-30

## 2020-08-30 RX ADMIN — LEVETIRACETAM 500 MG: 500 TABLET ORAL at 08:08

## 2020-08-30 RX ADMIN — IPRATROPIUM BROMIDE AND ALBUTEROL SULFATE 3 ML: .5; 2.5 SOLUTION RESPIRATORY (INHALATION) at 02:08

## 2020-08-30 RX ADMIN — AZITHROMYCIN MONOHYDRATE 500 MG: 500 INJECTION, POWDER, LYOPHILIZED, FOR SOLUTION INTRAVENOUS at 12:08

## 2020-08-30 RX ADMIN — METOPROLOL TARTRATE 25 MG: 25 TABLET, FILM COATED ORAL at 08:08

## 2020-08-30 RX ADMIN — IPRATROPIUM BROMIDE AND ALBUTEROL SULFATE 3 ML: .5; 2.5 SOLUTION RESPIRATORY (INHALATION) at 08:08

## 2020-08-30 RX ADMIN — ALBUTEROL SULFATE 2 PUFF: 90 AEROSOL, METERED RESPIRATORY (INHALATION) at 12:08

## 2020-08-30 RX ADMIN — PREDNISONE 40 MG: 20 TABLET ORAL at 08:08

## 2020-08-30 RX ADMIN — PROMETHAZINE HYDROCHLORIDE AND CODEINE PHOSPHATE 5 ML: 10; 6.25 SOLUTION ORAL at 03:08

## 2020-08-30 RX ADMIN — HYDRALAZINE HYDROCHLORIDE 25 MG: 25 TABLET ORAL at 04:08

## 2020-08-30 RX ADMIN — GUAIFENESIN AND DEXTROMETHORPHAN HYDROBROMIDE 1 TABLET: 600; 30 TABLET, EXTENDED RELEASE ORAL at 08:08

## 2020-08-30 RX ADMIN — HYDRALAZINE HYDROCHLORIDE 25 MG: 25 TABLET ORAL at 01:08

## 2020-08-30 RX ADMIN — AZITHROMYCIN 500 MG: 250 TABLET, FILM COATED ORAL at 11:08

## 2020-08-30 RX ADMIN — CYANOCOBALAMIN TAB 250 MCG 500 MCG: 250 TAB at 08:08

## 2020-08-30 RX ADMIN — AMLODIPINE BESYLATE 10 MG: 10 TABLET ORAL at 08:08

## 2020-08-30 RX ADMIN — ATORVASTATIN CALCIUM 40 MG: 20 TABLET, FILM COATED ORAL at 08:08

## 2020-08-30 RX ADMIN — FLUTICASONE FUROATE AND VILANTEROL TRIFENATATE 1 PUFF: 100; 25 POWDER RESPIRATORY (INHALATION) at 08:08

## 2020-08-30 RX ADMIN — HYDRALAZINE HYDROCHLORIDE 25 MG: 25 TABLET ORAL at 08:08

## 2020-08-30 RX ADMIN — CEFTRIAXONE SODIUM 1 G: 1 INJECTION, POWDER, FOR SOLUTION INTRAMUSCULAR; INTRAVENOUS at 11:08

## 2020-08-30 RX ADMIN — CEFTRIAXONE SODIUM 1 G: 1 INJECTION, POWDER, FOR SOLUTION INTRAMUSCULAR; INTRAVENOUS at 01:08

## 2020-08-30 NOTE — PROGRESS NOTES
Pharmacist Intervention IV to PO Note    Melvi Conrad is a 74 y.o. female being treated with IV medication azithromycin    Patient Data:    Vital Signs (Most Recent):  Temp: 98.2 °F (36.8 °C) (08/30/20 0400)  Pulse: 70 (08/30/20 0812)  Resp: (!) 24 (08/30/20 0812)  BP: 127/77 (08/30/20 0600)  SpO2: 96 % (08/30/20 0812)   Vital Signs (72h Range):  Temp:  [97.9 °F (36.6 °C)-99 °F (37.2 °C)]   Pulse:  []   Resp:  [16-39]   BP: (120-168)/(56-91)   SpO2:  [87 %-100 %]      CBC:  Recent Labs   Lab 08/28/20 1939 08/29/20  0343   WBC 7.05 7.05   RBC 2.10* 2.20*   HGB 7.0* 7.3*   HCT 22.4* 24.2*    203   * 110*   MCH 33.3* 33.2*   MCHC 31.3* 30.2*     CMP:     Recent Labs   Lab 08/28/20 1939 08/29/20  0343   * 141*   CALCIUM 8.5* 8.9   ALBUMIN 2.6* 2.7*   PROT 6.8 7.3    137   K 4.4 4.4   CO2 28 25    100   BUN 13 12   CREATININE 0.8 0.8   ALKPHOS 70 76   ALT 20 21   AST 21 27   BILITOT 0.7 0.5       Dietary Orders:  Diet Orders            Diet Cardiac: Cardiac starting at 08/28 2209            Based on the following criteria, this patient qualifies for intravenous to oral conversion:  [x] The patients gastrointestinal tract is functioning (tolerating medications via oral or enteral route for 24 hours and tolerating food or enteral feeds for 24 hours).  [x] The patient is hemodynamically stable for 24 hours (heart rate <100 beats per minute, systolic blood pressure >99 mm Hg, and respiratory rate <20 breaths per minute).  [x] The patient shows clinical improvement (afebrile for at least 24 hours and white blood cell count downtrending or normalized). Additionally, the patient must be non-neutropenic (absolute neutrophil count >500 cells/mm3).  [x] For antimicrobials, the patient has received IV therapy for at least 24 hours.    IV medication azithromycin 500 mg will be changed to oral medication azithromycin 500 mg for one dose.    Pharmacist's Name: Mali Bautista  Pharmacist's  Extension: [phone]

## 2020-08-30 NOTE — PLAN OF CARE
DX:Covid with acute hypoxic resp failure     Shift Events:v/s q 8hrs and monitor spo2     Plan of Care:monitor covid s/s and resolve hypoxia, abt therapy     Neuro: A/O x4     Respiratory:2-3 liers     Cardiac:N/S     Diet:cardiac      Gtts: azithromycin and ceftriaxone     Skin:bruising

## 2020-08-30 NOTE — PROGRESS NOTES
Hospital Medicine  Progress Note  [facility]      Patient Name: Melvi Conrad  MRN:  [id number]  [place] [facility] [place] [facility] [place] Surinder Schrader MD  Date of Admission:  8/28/2020     Length of Stay:  LOS: 2 days       Principal Problem:  Acute hypoxemic respiratory failure      HPI:  73 yo F with PMHx HTN, CKD3, COPD on 2L NC, epilepsy, and CAD presents to the hospital from [facility] with worsening SOB 1 week after being discharged from [facility] for COVID-19 pneumonia. The patient reports that over the last 2 days at the [facility], she has developed a progressive decline in her breathing. She reports that she was unable to participate much with therapy today and yesterday 2/2 SOB. She reports that she had been unable to receive her nebulizer treatments while in the SNF due to concerns of aerosolization of the virus, and she believes this significantly affected her recovery. She denies any chest pain, lightheadedness, palpitations, fevers, chills, or new cough.       Per son, the pateint was admitted to [facility] for symptoms of difficulty breathing, coughing, and fatigue. She tested positive for COVID-19 on admission and was admitted from 8/5- 8/21. Pt. And son reports that she spent the majority of her stay in the ICU, but she was never intubated. She received remdesivir, steroids, and convalescent plasma per son. Pt. Was also reportedly started on xarelto, but the reasoning is unclear. The son and pt. Deny that she ever had a blood clot. They states that she was put on the medication to prevent blood clots in the setting of COVID-19 infection. She was improving at time of discharged, but did require 2L NC at time of discharge which she had been on at [facility] until today.      Hospital Course:  Management of acute respiratory failure with hypoxia due to COVID-19 infection.    Interval History:     Patient lying in bed, mild respiratory distress. Reports SOB improving  but not back to baseline.     Review of Systems:  Constitutional: no fever or chills  Respiratory: Positive for cough and SOB  Cardiovascular: no chest pain or palpitations  Gastrointestinal: no nausea or vomiting, no abdominal pain or change in bowel habits  Genitourinary: no hematuria or dysuria  Integument/Breast: no rash or pruritis  Hematologic/Lymphatic: no easy bruising or lymphadenopathy  Musculoskeletal: no arthralgias or myalgias  Neurological: no seizures or tremors  Behavioral/Psych: no depression or anxiety    Inpatient Medications:    Current Facility-Administered Medications:     acetaminophen tablet 650 mg, 650 mg, Oral, Q4H PRN, Lan Cerrato MD    albuterol inhaler 2 puff, 2 puff, Inhalation, Q6H PRN, Lan Cerrato MD, 2 puff at 08/30/20 0051    albuterol-ipratropium 2.5 mg-0.5 mg/3 mL nebulizer solution 3 mL, 3 mL, Nebulization, Q6H WAKE, Lan Cerrato MD, 3 mL at 08/29/20 1946    amLODIPine tablet 10 mg, 10 mg, Oral, Daily, Lan Cerrato MD, 10 mg at 08/29/20 0920    atorvastatin tablet 40 mg, 40 mg, Oral, Daily, Lan Cerrato MD, 40 mg at 08/29/20 0920    azithromycin 500 mg in dextrose 5 % 250 mL IVPB (ready to mix system), 500 mg, Intravenous, Q24H, Lan Cerrato MD, Last Rate: 250 mL/hr at 08/30/20 0059, 500 mg at 08/30/20 0059    benzonatate capsule 100 mg, 100 mg, Oral, TID PRN, Surinder Schrader MD, 100 mg at 08/29/20 1616    cefTRIAXone injection 1 g, 1 g, Intravenous, Q24H, Lan Cerrato MD, 1 g at 08/30/20 0101    cyanocobalamin tablet 500 mcg, 500 mcg, Oral, Daily, Lan Cerrato MD, 500 mcg at 08/29/20 0922    dextromethorphan-guaifenesin  mg per 12 hr tablet 1 tablet, 1 tablet, Oral, BID, Surinder Schrader MD, 1 tablet at 08/29/20 2043    fluticasone furoate-vilanteroL 100-25 mcg/dose diskus inhaler 1 puff, 1 puff, Inhalation, Daily, Surinder Schrader MD, 1 puff at 08/29/20 0915    glucose chewable tablet 16 g, 16 g, Oral, PRN, Lan Cerrato MD     "glucose chewable tablet 24 g, 24 g, Oral, PRN, Lan Cerrato MD    hydrALAZINE tablet 25 mg, 25 mg, Oral, QID, Lan Cerrato MD, 25 mg at 08/29/20 2043    levETIRAcetam tablet 500 mg, 500 mg, Oral, BID, Lan Crerato MD, 500 mg at 08/29/20 2043    melatonin tablet 6 mg, 6 mg, Oral, Nightly PRN, Lan Cerrato MD, 6 mg at 08/29/20 0105    metoprolol tartrate (LOPRESSOR) tablet 25 mg, 25 mg, Oral, BID, Lan Cerrato MD, 25 mg at 08/29/20 2043    ondansetron injection 4 mg, 4 mg, Intravenous, Q8H PRN, Lan Cerrato MD    predniSONE tablet 40 mg, 40 mg, Oral, Daily, Lan Cerrato MD, 40 mg at 08/29/20 0922    promethazine (PHENERGAN) 6.25 mg in dextrose 5 % 50 mL IVPB, 6.25 mg, Intravenous, Q6H PRN, Lan Cerrato MD    sodium chloride 0.9% flush 10 mL, 10 mL, Intravenous, PRN, Koki Mehta MD      Physical Exam:      Intake/Output Summary (Last 24 hours) at 8/30/2020 0740  Last data filed at 8/30/2020 0356  Gross per 24 hour   Intake 480 ml   Output 400 ml   Net 80 ml     Wt Readings from Last 3 Encounters:   08/28/20 81.5 kg (179 lb 10.8 oz)   08/28/19 77.1 kg (170 lb)   07/28/14 66.2 kg (146 lb)       /77   Pulse 82   Temp 98.2 °F (36.8 °C)   Resp (!) 35   Ht 4' 11" (1.499 m)   Wt 81.5 kg (179 lb 10.8 oz)   SpO2 (!) 87%   Breastfeeding No   BMI 36.29 kg/m²     GEN: mild respiratory distress, conversant  Resp: Decreased breath sounds B/L, no wheezes or rales, normal work of breathing   CV: RRR, II/VI systolic ejection murmur, no edema  GI: soft, NTND  Skin: no rash  Neuro: CN grossly intact, no focal neurologic deficits    Laboratory:  No results found for: [id number]    Recent Labs   Lab 08/28/20 1939 08/29/20  0343   WBC 7.05 7.05   LYMPH 17.3*  1.2 8.5*  0.6*   HGB 7.0* 7.3*   HCT 22.4* 24.2*    203     Recent Labs   Lab 08/28/20 1939 08/29/20  0343    137   K 4.4 4.4    100   CO2 28 25   BUN 13 12   CREATININE 0.8 0.8   * 141*   CALCIUM " 8.5* 8.9     Recent Labs   Lab 08/28/20 1939 08/29/20  0343   ALKPHOS 70 76   ALT 20 21   AST 21 27   ALBUMIN 2.6* 2.7*   PROT 6.8 7.3   BILITOT 0.7 0.5        Recent Labs     08/28/20 1939 08/29/20  0840   DDIMER 2.54*  --    FERRITIN 353*  --    .0*  --    *  --    *  --    TROPONINI 0.092* 0.052*   CPK 34  --        All labs within the last 24 hours were reviewed.     Microbiology:  Microbiology Results (last 7 days)     ** No results found for the last 168 hours. **            Imaging  ECG Results          Repeat EKG 12-lead (Final result)  Result time 08/29/20 22:21:04    Final result by Interface, Lab In [facility] (08/29/20 22:21:04)                 Narrative:    Test Reason : R79.89,    Vent. Rate : 073 BPM     Atrial Rate : 073 BPM     P-R Int : 152 ms          QRS Dur : 072 ms      QT Int : 402 ms       P-R-T Axes : 105 060 144 degrees     QTc Int : 442 ms    Baseline Artifact  Normal sinus rhythm  Nonspecific ST and/or T wave abnormalities  Abnormal ECG  When compared with ECG of 28-AUG-2020 19:07,  No significant change was found  Confirmed by Ruddy Ruby MD ([id number]) on 8/29/2020 10:20:54 PM    Referred By: System System           Confirmed By:Ruddy Ruby MD                             EKG 12-lead (Final result)  Result time 08/29/20 22:08:57    Final result by Interface, Lab In [facility] (08/29/20 22:08:57)                 Narrative:    Test Reason : R68.89,    Vent. Rate : 068 BPM     Atrial Rate : 068 BPM     P-R Int : 152 ms          QRS Dur : 078 ms      QT Int : 414 ms       P-R-T Axes : 002 051 142 degrees     QTc Int : 440 ms    Baseline Artifact  Baseline wander  Normal sinus rhythm  ST-t wave changes of strain or ischemia  Abnormal ECG  When compared with ECG of 29-JUL-2014 06:14,  No significant change was found  Confirmed by Ruddy Ruby MD ([id number]) on 8/29/2020 10:08:46 PM    Referred By: System System           Confirmed By:Ruddy Ruby MD                              No results  found for this or any previous visit.    US Upper Extremity Veins Right  Narrative: EXAMINATION:  US UPPER EXTREMITY VEINS LEFT; US UPPER EXTREMITY VEINS RIGHT    CLINICAL HISTORY:  evaluate for thrombosis;; rule out thrombus;    TECHNIQUE:  Duplex and color flow Doppler evaluation and dynamic compression was performed of the right and left upper extremity veins.  Covid-19 protocol was performed.    COMPARISON:  None    FINDINGS:  Right central veins: The internal jugular, subclavian, and axillary and brachial veins are patent and free of thrombus.    Left central veins: The internal jugular, subclavian, and axillary and brachial veins are patent and free of thrombus.    : N/A  Impression: No thrombus in central veins of the right or left upper extremity.    Electronically signed by: Wilder Hall MD  Date:    08/29/2020  Time:    15:18  US Upper Extremity Veins Left  Narrative: EXAMINATION:  US UPPER EXTREMITY VEINS LEFT; US UPPER EXTREMITY VEINS RIGHT    CLINICAL HISTORY:  evaluate for thrombosis;; rule out thrombus;    TECHNIQUE:  Duplex and color flow Doppler evaluation and dynamic compression was performed of the right and left upper extremity veins.  Covid-19 protocol was performed.    COMPARISON:  None    FINDINGS:  Right central veins: The internal jugular, subclavian, and axillary and brachial veins are patent and free of thrombus.    Left central veins: The internal jugular, subclavian, and axillary and brachial veins are patent and free of thrombus.    : N/A  Impression: No thrombus in central veins of the right or left upper extremity.    Electronically signed by: Wilder Hall MD  Date:    08/29/2020  Time:    15:18  US Lower Extremity Veins Bilateral  Narrative: EXAMINATION:  US LOWER EXTREMITY VEINS BILATERAL    CLINICAL HISTORY:  evaluate for thrombosis;    TECHNIQUE:  Duplex and color flow Doppler and dynamic compression was performed of the bilateral lower extremity veins was performed.   Coban protocol was performed.    COMPARISON:  None    FINDINGS:  Right thigh veins: The common femoral, femoral, popliteal, and deep femoral veins are patent and free of thrombus. The veins are normally compressible and have normal phasic flow and augmentation response.    Left thigh veins: The common femoral, femoral, popliteal, upper greater saphenous, and deep femoral veins are patent and free of thrombus. The veins are normally compressible and have normal phasic flow and augmentation response.    Miscellaneous: None  Impression: No evidence of deep venous thrombosis in either lower extremity.    Electronically signed by: Wilder Hall MD  Date:    08/29/2020  Time:    15:16  CTA Chest Non-Coronary (PE Study)  Narrative: EXAMINATION:  CTA CHEST NON CORONARY    CLINICAL HISTORY:  PE suspected, high pretest prob;    TECHNIQUE:  Low dose axial images, sagittal and coronal reformations were obtained from the thoracic inlet to the lung bases following the IV administration of 100 mL of Omnipaque 350.  Contrast timing was optimized to evaluate the pulmonary arteries.    COMPARISON:  Radiograph chest AP portable 08/28/2020    FINDINGS:  Pulmonary vasculature: Satisfactory opacification of the pulmonary arterial system with no filling defect to the segmental level.    Aorta: Left-sided aortic arch.  No aneurysmal dilatation.  Scattered calcified and noncalcified atherosclerosis.    Base of Neck: No significant abnormality.    Thoracic soft tissues: Unremarkable    Heart: Normal size. No effusion.  Prominent pericardial fat.  Coronary artery, mitral annular, and aortic valve atherosclerotic calcification..    Veronica/Mediastinum: No pathologic kenia enlargement.    Airways: The central airways are patent.    Lungs/Pleura: Small bilateral pleural effusions with associated compressive atelectasis, right greater than left.  Interlobular septal thickening and bilateral ground-glass opacities, primarily within a peripheral  distribution.  No pneumothorax..    Esophagus: Mildly distended and fluid-filled.    Upper Abdomen: No abnormality of the partially imaged upper abdomen.    Bones: Degenerative changes.  No acute fracture. No suspicious lytic or sclerotic lesions.  Impression: No pulmonary thromboembolism or infarct.    Small bilateral pleural effusions with associated compressive atelectasis.  Bilateral peripherally based ground-glass opacities, which may be seen in the setting of viral infection.    Esophagus is distended with fluid, which could be concerning for aspiration risk.    Significant calcified and noncalcified atherosclerosis.    Electronically signed by resident: Skylar Venegas  Date:    08/29/2020  Time:    02:41    Electronically signed by: Asher Chavez MD  Date:    08/29/2020  Time:    03:07      All imaging within the last 24 hours was reviewed.     Assessment and Plan:    Active Hospital Problems    Diagnosis  POA    *Acute hypoxemic respiratory failure [J96.01]  Unknown    COVID-19 [U07.1]  Yes    COPD exacerbation [J44.1]  Unknown    Anemia [D64.9]  Unknown      Resolved Hospital Problems   No resolved problems to display.       Scheduled Meds:   albuterol-ipratropium  3 mL Nebulization Q6H WAKE    amLODIPine  10 mg Oral Daily    atorvastatin  40 mg Oral Daily    azithromycin  500 mg Intravenous Q24H    cefTRIAXone (ROCEPHIN) IVPB  1 g Intravenous Q24H    cyanocobalamin  500 mcg Oral Daily    dextromethorphan-guaifenesin  mg  1 tablet Oral BID    fluticasone furoate-vilanteroL  1 puff Inhalation Daily    hydrALAZINE  25 mg Oral QID    levETIRAcetam  500 mg Oral BID    metoprolol tartrate  25 mg Oral BID    predniSONE  40 mg Oral Daily     Continuous Infusions:  PRN Meds:.acetaminophen, albuterol, benzonatate, glucose, glucose, melatonin, ondansetron, promethazine (PHENERGAN) IVPB, sodium chloride 0.9%      COVID-19 Virus Infection  Viral Pneumonia due to COVID-19    - COVID-19 testing:  positive on 8/5/20 at [facility]  - Isolation: Airborne/Droplet. Surgical mask on patient. Notify Infection Control  - Diagnostics: Trend Q48hrs if stable, more frequently if patient decompensating     Laboratory/Study Frequency Result   CBC Admit & Q48h    CMP Admit & Q48h    D-dimer Admit & Q48h    Ferritin Admit & Q48h    CRP Admit & Q48h    CPK Admit & Q24h if elevated    LDH Admit & Q48h    Vitamin D Admit    BNP Admit    Troponin Admit & Q6h if elevated    Procalcitonin Admit      Lymphopenia, hyponatremia, hyperferritinemia, elevated troponin, elevated d-dimer, age, and medical comorbidities are significant predictors of poor clinical outcome    - Management: per [facility] COVID Treatment Protocol (4/15/20)    - Monitoring:   - Telemetry & Continuous Pulse Oximetry if > 3L via NC    - Nutrition:    - Multivitamin PO daily   - Boost supplement   - Vitamin D 1000IU daily if deficient   - Ascorbic acid 500mg PO bid    - Supportive Care:   - acetaminophen 650mg PO Q6hr PRN fever/headache   - loperamide PRN viral diarrhea   - IVF if indicated, restrictive strategy preferred, no maintenance IV if able   - VTE PPx: enoxaparin or heparin SQ unless contraindicated    - Antibiotics:   - ceftriaxone 1g Q24h x 5 days  - azithromycin 500mg IV/PO x3    - Investigational Therapies:  - atorvastatin 40mg po daily  - per patient's son, received patient received remdesivir, steroids, and convalescent plasma at [facility]    Acute Hypoxemic Respiratory Failure  - On 3L NC. Wean O2 as tolerated  - Order RT consult via Respiratory Communication for COVID Protocols  - Order Incentive Spirometer Q4h  - Order Flutter Valve Q4h  - Continuous Pulse Oximetry  - Goal SpO2 92-96%  - Supplemental O2 via LFNC, VentiMask, or HFNC (see Respiratory Support Oxygen Therapies)  - If wheezing, albuterol INH Q6h scheduled & PRN  - Proning Protocol if patient is a candidate (see  Proning Protocol)   - GCS >13, able to self-prone  - If deterioration, may warrant  "trial of NIPPV and transfer to [facility] pressure room or immediate ICU consult  - continue dexamethasone  - codeine syrup prn for cough     COPD exacerbation- improving   - getachew  - Breo   - on dexamethasone   - wean O2 as tolerated     Anemia  - Hgb 7.0 on presentation, baseline unclear. Pt. Denies any melena or hematochezia  - Pt. Recently started On xarelto per SNF paperwork, but no diagnosis of blood clot listed and pt. Denies ever having one  - CTA ordered to rule out PE  - will hold xarelto in meantime given the anemia  - Continue home B12 supplementation  - Monitor CBC and transfuse to goal >7.     Anticoagulation use  - on Xarelto at home  - family report that patient was prescribed Xarelto as ppx in the setting of COVID infection   - holding Xarelto due to anemia  - US upper and lower extremities negative for DVT  - no h/o afib   - followup with PCP to discuss resuming Xarelto     HTN  - Continue home meds norvasc, hydralazine, and lopressor     Hx of CAD  - Per SNF paperwork, full history unclear  - Pt. Denies any active chest pain, no ST changes compared to prior EKG  -Troponin mildly elevated at 0.092, but this appears to be chronic and is not surprising in setting of demand from hypoxia.   - Low-suspicion for ACS, but will continue to trend troponin.   - TTE ordered given the patient's reported cardiac history and acitve hypoxia without a baseline/recent TTE in system  -Continue statin        Advance Care Planning  Goals of care, counseling/discussion: Full code   Advance Care Planning     If patient transitions to Comfort-Focused Care, please place "Nurse Communication: End of Life Care, family members allowed to visit, including spouse/partner and adult children [please list names]. Please ask family to visit as a group and leave as a group.       VTE High Risk Prophylaxis:   VTE Risk Mitigation (From admission, onward)         Ordered     IP VTE HIGH RISK PATIENT  Once      08/28/20 2223     Place " sequential compression device  Until discontinued      08/28/20 2223                  Subsequent Inpatient Hospital Care      Level 3 02178 Total visit time was 35 minutes or greater with greater than 50% of time spent in counseling and coordination of care.             Surinder Schrader MD  Hospital Medicine Staff  Pager: [phone]; Spectra: [phone]

## 2020-08-30 NOTE — PLAN OF CARE
Prednisone was discontinued today. PRN promethazine-codine has been ordered q0utfaw for cough. Last dose was given at 1523.

## 2020-08-31 LAB
BASOPHILS # BLD AUTO: 0.03 K/UL (ref 0–0.2)
BASOPHILS NFR BLD: 0.3 % (ref 0–1.9)
BLD PROD TYP BPU: NORMAL
BLOOD UNIT EXPIRATION DATE: NORMAL
BLOOD UNIT TYPE CODE: 9500
BLOOD UNIT TYPE: NORMAL
CODING SYSTEM: NORMAL
DIFFERENTIAL METHOD: ABNORMAL
DISPENSE STATUS: NORMAL
EOSINOPHIL # BLD AUTO: 0 K/UL (ref 0–0.5)
EOSINOPHIL NFR BLD: 0.3 % (ref 0–8)
ERYTHROCYTE [DISTWIDTH] IN BLOOD BY AUTOMATED COUNT: 17.6 % (ref 11.5–14.5)
HCT VFR BLD AUTO: 23 % (ref 37–48.5)
HGB BLD-MCNC: 6.6 G/DL (ref 12–16)
IMM GRANULOCYTES # BLD AUTO: 0.13 K/UL (ref 0–0.04)
IMM GRANULOCYTES NFR BLD AUTO: 1.4 % (ref 0–0.5)
LYMPHOCYTES # BLD AUTO: 1.3 K/UL (ref 1–4.8)
LYMPHOCYTES NFR BLD: 14.4 % (ref 18–48)
MCH RBC QN AUTO: 32.7 PG (ref 27–31)
MCHC RBC AUTO-ENTMCNC: 28.7 G/DL (ref 32–36)
MCV RBC AUTO: 114 FL (ref 82–98)
MONOCYTES # BLD AUTO: 0.8 K/UL (ref 0.3–1)
MONOCYTES NFR BLD: 9 % (ref 4–15)
NEUTROPHILS # BLD AUTO: 6.9 K/UL (ref 1.8–7.7)
NEUTROPHILS NFR BLD: 74.6 % (ref 38–73)
NRBC BLD-RTO: 2 /100 WBC
PLATELET # BLD AUTO: 230 K/UL (ref 150–350)
PMV BLD AUTO: 10.8 FL (ref 9.2–12.9)
RBC # BLD AUTO: 2.02 M/UL (ref 4–5.4)
TRANS ERYTHROCYTES VOL PATIENT: NORMAL ML
WBC # BLD AUTO: 9.31 K/UL (ref 3.9–12.7)

## 2020-08-31 PROCEDURE — 99233 PR SUBSEQUENT HOSPITAL CARE,LEVL III: ICD-10-PCS | Mod: ,,, | Performed by: INTERNAL MEDICINE

## 2020-08-31 PROCEDURE — P9021 RED BLOOD CELLS UNIT: HCPCS

## 2020-08-31 PROCEDURE — 11000001 HC ACUTE MED/SURG PRIVATE ROOM

## 2020-08-31 PROCEDURE — 63600175 PHARM REV CODE 636 W HCPCS: Performed by: INTERNAL MEDICINE

## 2020-08-31 PROCEDURE — 27000221 HC OXYGEN, UP TO 24 HOURS

## 2020-08-31 PROCEDURE — 25000242 PHARM REV CODE 250 ALT 637 W/ HCPCS: Performed by: HOSPITALIST

## 2020-08-31 PROCEDURE — 36415 COLL VENOUS BLD VENIPUNCTURE: CPT

## 2020-08-31 PROCEDURE — 85025 COMPLETE CBC W/AUTO DIFF WBC: CPT

## 2020-08-31 PROCEDURE — 94761 N-INVAS EAR/PLS OXIMETRY MLT: CPT

## 2020-08-31 PROCEDURE — 99233 SBSQ HOSP IP/OBS HIGH 50: CPT | Mod: ,,, | Performed by: INTERNAL MEDICINE

## 2020-08-31 PROCEDURE — 25000003 PHARM REV CODE 250: Performed by: INTERNAL MEDICINE

## 2020-08-31 PROCEDURE — 36430 TRANSFUSION BLD/BLD COMPNT: CPT

## 2020-08-31 PROCEDURE — 94640 AIRWAY INHALATION TREATMENT: CPT

## 2020-08-31 PROCEDURE — 25000003 PHARM REV CODE 250: Performed by: HOSPITALIST

## 2020-08-31 RX ORDER — HYDROCODONE BITARTRATE AND ACETAMINOPHEN 500; 5 MG/1; MG/1
TABLET ORAL
Status: DISCONTINUED | OUTPATIENT
Start: 2020-08-31 | End: 2020-09-09 | Stop reason: HOSPADM

## 2020-08-31 RX ADMIN — IPRATROPIUM BROMIDE AND ALBUTEROL SULFATE 3 ML: .5; 2.5 SOLUTION RESPIRATORY (INHALATION) at 02:08

## 2020-08-31 RX ADMIN — LEVETIRACETAM 500 MG: 500 TABLET ORAL at 09:08

## 2020-08-31 RX ADMIN — IPRATROPIUM BROMIDE AND ALBUTEROL SULFATE 3 ML: .5; 2.5 SOLUTION RESPIRATORY (INHALATION) at 07:08

## 2020-08-31 RX ADMIN — AMLODIPINE BESYLATE 10 MG: 10 TABLET ORAL at 08:08

## 2020-08-31 RX ADMIN — PROMETHAZINE HYDROCHLORIDE AND CODEINE PHOSPHATE 5 ML: 10; 6.25 SOLUTION ORAL at 10:08

## 2020-08-31 RX ADMIN — HYDRALAZINE HYDROCHLORIDE 25 MG: 25 TABLET ORAL at 04:08

## 2020-08-31 RX ADMIN — ATORVASTATIN CALCIUM 40 MG: 20 TABLET, FILM COATED ORAL at 08:08

## 2020-08-31 RX ADMIN — ALBUTEROL SULFATE 2 PUFF: 90 AEROSOL, METERED RESPIRATORY (INHALATION) at 01:08

## 2020-08-31 RX ADMIN — FLUTICASONE FUROATE AND VILANTEROL TRIFENATATE 1 PUFF: 100; 25 POWDER RESPIRATORY (INHALATION) at 08:08

## 2020-08-31 RX ADMIN — GUAIFENESIN AND DEXTROMETHORPHAN HYDROBROMIDE 1 TABLET: 600; 30 TABLET, EXTENDED RELEASE ORAL at 08:08

## 2020-08-31 RX ADMIN — HYDRALAZINE HYDROCHLORIDE 25 MG: 25 TABLET ORAL at 01:08

## 2020-08-31 RX ADMIN — ALBUTEROL SULFATE 2 PUFF: 90 AEROSOL, METERED RESPIRATORY (INHALATION) at 06:08

## 2020-08-31 RX ADMIN — HYDRALAZINE HYDROCHLORIDE 25 MG: 25 TABLET ORAL at 08:08

## 2020-08-31 RX ADMIN — GUAIFENESIN AND DEXTROMETHORPHAN HYDROBROMIDE 1 TABLET: 600; 30 TABLET, EXTENDED RELEASE ORAL at 09:08

## 2020-08-31 RX ADMIN — IPRATROPIUM BROMIDE AND ALBUTEROL SULFATE 3 ML: .5; 2.5 SOLUTION RESPIRATORY (INHALATION) at 08:08

## 2020-08-31 RX ADMIN — METOPROLOL TARTRATE 25 MG: 25 TABLET, FILM COATED ORAL at 09:08

## 2020-08-31 RX ADMIN — LEVETIRACETAM 500 MG: 500 TABLET ORAL at 08:08

## 2020-08-31 RX ADMIN — HYDRALAZINE HYDROCHLORIDE 25 MG: 25 TABLET ORAL at 09:08

## 2020-08-31 RX ADMIN — PROMETHAZINE HYDROCHLORIDE AND CODEINE PHOSPHATE 5 ML: 10; 6.25 SOLUTION ORAL at 04:08

## 2020-08-31 RX ADMIN — DEXAMETHASONE 6 MG: 4 TABLET ORAL at 08:08

## 2020-08-31 RX ADMIN — METOPROLOL TARTRATE 25 MG: 25 TABLET, FILM COATED ORAL at 08:08

## 2020-08-31 NOTE — PLAN OF CARE
Pt OOB to chair. O2 sat92% on 4L via NC resp distress on exertion denies pain at this note. Call light within reach able to make needs known.

## 2020-08-31 NOTE — PLAN OF CARE
08/31/20 1144   Discharge Assessment   Assessment Type Discharge Planning Assessment   Confirmed/corrected address and phone number on facesheet? Yes   Assessment information obtained from? Caregiver   Expected Length of Stay (days) 5   Communicated expected length of stay with patient/caregiver no   Prior to hospitilization cognitive status: Alert/Oriented   Prior to hospitalization functional status: Assistive Equipment   Current cognitive status: Alert/Oriented   Current Functional Status: Assistive Equipment   Lives With grandchild(danny)   Able to Return to Prior Arrangements no   Is patient able to care for self after discharge? Yes   Patient's perception of discharge disposition skilled nursing facility   Readmission Within the Last 30 Days no previous admission in last 30 days   Patient currently being followed by outpatient case management? Yes   If yes, name of outpatient case management following: insurance company assigned oupatient case management   Patient currently receives any other outside agency services? No   Equipment Currently Used at Home walker, rolling   Do you have any problems affording any of your prescribed medications? No   Is the patient taking medications as prescribed? yes   Does the patient have transportation home? Yes   Transportation Anticipated family or friend will provide   Does the patient receive services at the [facility]? No   Discharge Plan A Skilled Nursing Facility   Discharge Plan B Skilled Nursing Facility   DME Needed Upon Discharge  none   Patient/Family in Agreement with Plan unable to assess

## 2020-08-31 NOTE — PLAN OF CARE
08/31/20 1251   Post-Acute Status   Post-Acute Authorization Placement     Patient would like to admit to [facility] [facility] [facility] when medically stable 1st preference for SNF.        Mónica Hall LMSW  [facility]   [id number]

## 2020-08-31 NOTE — PLAN OF CARE
DX:Covid with acute hypoxic resp failure     Shift Events:v/s q 8hrs and monitor spo2     Plan of Care:monitor covid s/s and resolve hypoxia, abt therapy     Neuro: A/O x4     Respiratory:2-3 liters     Cardiac:N/S     Diet:cardiac      Gtts: azithromycin and ceftriaxone     Skin:bruising

## 2020-08-31 NOTE — PLAN OF CARE
08/31/20 1232   Post-Acute Status   Post-Acute Authorization Placement   Post-Acute Placement Status Referrals Sent     Patient is in need of skilled nursing facility placement post discharge. SW sent the referrals via  and will follow up.        Mónica Hall LMSW  [facility]   [phone]

## 2020-08-31 NOTE — PLAN OF CARE
DUE TO COVID DISCHARGE ASSESSMENT WAS DONE TELEPHONICALLY WIITH PTS GRANDSON PAPO KULKARNI   GRANDSON STATES THAT PT HAD A PRIOR ACUTE CARE ADMISSION TO [facility] AND WAS DISCHARGED TO [facility]    PHARMACY [facility]  [address] [address]   POA/SON JELENA KULKARNI   PT LIVES IN A ONE STORY HOUSE   THE EXPECTATION OF THE FAMILY IS THAT SHE WILL RETURN TO [facility] DND AFTER SHE IS MEDICALLY READY  PCP IS DR ZACH LUU /JONAS GUZMAN

## 2020-08-31 NOTE — CONSULTS
[facility] consulted for  Melvi Richardbere to be followed through telemedicine modalities.    The patient is currently not appropriate for virtual visits due to copd exacerbation and need for clinical assessments.    Please re-consult once the patient is appropriate for virtual visits.    Annamaria Tubbs

## 2020-09-01 LAB
ANION GAP SERPL CALC-SCNC: 9 MMOL/L (ref 8–16)
BASOPHILS # BLD AUTO: 0.04 K/UL (ref 0–0.2)
BASOPHILS NFR BLD: 0.4 % (ref 0–1.9)
BUN SERPL-MCNC: 24 MG/DL (ref 8–23)
CALCIUM SERPL-MCNC: 8.8 MG/DL (ref 8.7–10.5)
CHLORIDE SERPL-SCNC: 101 MMOL/L (ref 95–110)
CO2 SERPL-SCNC: 27 MMOL/L (ref 23–29)
CREAT SERPL-MCNC: 0.8 MG/DL (ref 0.5–1.4)
CRP SERPL-MCNC: 34.7 MG/L (ref 0–8.2)
DIFFERENTIAL METHOD: ABNORMAL
EOSINOPHIL # BLD AUTO: 0 K/UL (ref 0–0.5)
EOSINOPHIL NFR BLD: 0.1 % (ref 0–8)
ERYTHROCYTE [DISTWIDTH] IN BLOOD BY AUTOMATED COUNT: 20.6 % (ref 11.5–14.5)
EST. GFR  (AFRICAN AMERICAN): >60 ML/MIN/1.73 M^2
EST. GFR  (NON AFRICAN AMERICAN): >60 ML/MIN/1.73 M^2
FERRITIN SERPL-MCNC: 287 NG/ML (ref 20–300)
GLUCOSE SERPL-MCNC: 132 MG/DL (ref 70–110)
HCT VFR BLD AUTO: 28.8 % (ref 37–48.5)
HGB BLD-MCNC: 9.3 G/DL (ref 12–16)
IMM GRANULOCYTES # BLD AUTO: 0.26 K/UL (ref 0–0.04)
IMM GRANULOCYTES NFR BLD AUTO: 2.3 % (ref 0–0.5)
LYMPHOCYTES # BLD AUTO: 1.4 K/UL (ref 1–4.8)
LYMPHOCYTES NFR BLD: 12.3 % (ref 18–48)
MCH RBC QN AUTO: 32.3 PG (ref 27–31)
MCHC RBC AUTO-ENTMCNC: 32.3 G/DL (ref 32–36)
MCV RBC AUTO: 100 FL (ref 82–98)
MONOCYTES # BLD AUTO: 0.8 K/UL (ref 0.3–1)
MONOCYTES NFR BLD: 7.3 % (ref 4–15)
NEUTROPHILS # BLD AUTO: 8.8 K/UL (ref 1.8–7.7)
NEUTROPHILS NFR BLD: 77.6 % (ref 38–73)
NRBC BLD-RTO: 3 /100 WBC
PLATELET # BLD AUTO: 258 K/UL (ref 150–350)
PMV BLD AUTO: 10.8 FL (ref 9.2–12.9)
POTASSIUM SERPL-SCNC: 5 MMOL/L (ref 3.5–5.1)
RBC # BLD AUTO: 2.88 M/UL (ref 4–5.4)
SARS-COV-2 RDRP RESP QL NAA+PROBE: POSITIVE
SODIUM SERPL-SCNC: 137 MMOL/L (ref 136–145)
WBC # BLD AUTO: 11.26 K/UL (ref 3.9–12.7)

## 2020-09-01 PROCEDURE — 63600175 PHARM REV CODE 636 W HCPCS: Performed by: INTERNAL MEDICINE

## 2020-09-01 PROCEDURE — 99233 SBSQ HOSP IP/OBS HIGH 50: CPT | Mod: ,,, | Performed by: INTERNAL MEDICINE

## 2020-09-01 PROCEDURE — 80048 BASIC METABOLIC PNL TOTAL CA: CPT

## 2020-09-01 PROCEDURE — 97530 THERAPEUTIC ACTIVITIES: CPT

## 2020-09-01 PROCEDURE — 25000242 PHARM REV CODE 250 ALT 637 W/ HCPCS: Performed by: HOSPITALIST

## 2020-09-01 PROCEDURE — 97535 SELF CARE MNGMENT TRAINING: CPT

## 2020-09-01 PROCEDURE — 94640 AIRWAY INHALATION TREATMENT: CPT

## 2020-09-01 PROCEDURE — 27000221 HC OXYGEN, UP TO 24 HOURS

## 2020-09-01 PROCEDURE — 82728 ASSAY OF FERRITIN: CPT

## 2020-09-01 PROCEDURE — 85025 COMPLETE CBC W/AUTO DIFF WBC: CPT

## 2020-09-01 PROCEDURE — 99233 PR SUBSEQUENT HOSPITAL CARE,LEVL III: ICD-10-PCS | Mod: ,,, | Performed by: INTERNAL MEDICINE

## 2020-09-01 PROCEDURE — 86140 C-REACTIVE PROTEIN: CPT

## 2020-09-01 PROCEDURE — 97116 GAIT TRAINING THERAPY: CPT

## 2020-09-01 PROCEDURE — 11000001 HC ACUTE MED/SURG PRIVATE ROOM

## 2020-09-01 PROCEDURE — 94761 N-INVAS EAR/PLS OXIMETRY MLT: CPT

## 2020-09-01 PROCEDURE — 25000003 PHARM REV CODE 250: Performed by: INTERNAL MEDICINE

## 2020-09-01 PROCEDURE — 25000003 PHARM REV CODE 250: Performed by: HOSPITALIST

## 2020-09-01 PROCEDURE — 36415 COLL VENOUS BLD VENIPUNCTURE: CPT

## 2020-09-01 PROCEDURE — U0002 COVID-19 LAB TEST NON-CDC: HCPCS

## 2020-09-01 PROCEDURE — 63600175 PHARM REV CODE 636 W HCPCS: Performed by: HOSPITALIST

## 2020-09-01 RX ADMIN — HYDRALAZINE HYDROCHLORIDE 25 MG: 25 TABLET ORAL at 08:09

## 2020-09-01 RX ADMIN — METOPROLOL TARTRATE 25 MG: 25 TABLET, FILM COATED ORAL at 08:09

## 2020-09-01 RX ADMIN — CEFTRIAXONE SODIUM 1 G: 1 INJECTION, POWDER, FOR SOLUTION INTRAMUSCULAR; INTRAVENOUS at 12:09

## 2020-09-01 RX ADMIN — PROMETHAZINE HYDROCHLORIDE AND CODEINE PHOSPHATE 5 ML: 10; 6.25 SOLUTION ORAL at 09:09

## 2020-09-01 RX ADMIN — AMLODIPINE BESYLATE 10 MG: 10 TABLET ORAL at 08:09

## 2020-09-01 RX ADMIN — DEXAMETHASONE 6 MG: 4 TABLET ORAL at 08:09

## 2020-09-01 RX ADMIN — HYDRALAZINE HYDROCHLORIDE 25 MG: 25 TABLET ORAL at 04:09

## 2020-09-01 RX ADMIN — CYANOCOBALAMIN TAB 250 MCG 500 MCG: 250 TAB at 09:09

## 2020-09-01 RX ADMIN — GUAIFENESIN AND DEXTROMETHORPHAN HYDROBROMIDE 1 TABLET: 600; 30 TABLET, EXTENDED RELEASE ORAL at 08:09

## 2020-09-01 RX ADMIN — IPRATROPIUM BROMIDE AND ALBUTEROL SULFATE 3 ML: .5; 2.5 SOLUTION RESPIRATORY (INHALATION) at 01:09

## 2020-09-01 RX ADMIN — ATORVASTATIN CALCIUM 40 MG: 20 TABLET, FILM COATED ORAL at 08:09

## 2020-09-01 RX ADMIN — IPRATROPIUM BROMIDE AND ALBUTEROL SULFATE 3 ML: .5; 2.5 SOLUTION RESPIRATORY (INHALATION) at 07:09

## 2020-09-01 RX ADMIN — IPRATROPIUM BROMIDE AND ALBUTEROL SULFATE 3 ML: .5; 2.5 SOLUTION RESPIRATORY (INHALATION) at 08:09

## 2020-09-01 RX ADMIN — LEVETIRACETAM 500 MG: 500 TABLET ORAL at 08:09

## 2020-09-01 RX ADMIN — HYDRALAZINE HYDROCHLORIDE 25 MG: 25 TABLET ORAL at 09:09

## 2020-09-01 RX ADMIN — FLUTICASONE FUROATE AND VILANTEROL TRIFENATATE 1 PUFF: 100; 25 POWDER RESPIRATORY (INHALATION) at 08:09

## 2020-09-01 RX ADMIN — HYDRALAZINE HYDROCHLORIDE 25 MG: 25 TABLET ORAL at 12:09

## 2020-09-01 NOTE — PROGRESS NOTES
Hospital Medicine  Progress Note  [facility]      Patient Name: Melvi Conrad  MRN:  [id number]  Hospital Medicine Team: [facility] G Surinder Schrader MD  Date of Admission:  8/28/2020     Length of Stay:  LOS: 4 days       Principal Problem:  Acute hypoxemic respiratory failure      HPI:  75 yo F with PMHx HTN, CKD3, COPD on 2L NC, epilepsy, and CAD presents to the hospital from [facility] with worsening SOB 1 week after being discharged from [facility] for COVID-19 pneumonia. The patient reports that over the last 2 days at the [facility], she has developed a progressive decline in her breathing. She reports that she was unable to participate much with therapy today and yesterday 2/2 SOB. She reports that she had been unable to receive her nebulizer treatments while in the SNF due to concerns of aerosolization of the virus, and she believes this significantly affected her recovery. She denies any chest pain, lightheadedness, palpitations, fevers, chills, or new cough.       Per son, the pateint was admitted to [facility] for symptoms of difficulty breathing, coughing, and fatigue. She tested positive for COVID-19 on admission and was admitted from 8/5- 8/21. Pt. And son reports that she spent the majority of her stay in the ICU, but she was never intubated. She received remdesivir, steroids, and convalescent plasma per son. Pt. Was also reportedly started on xarelto, but the reasoning is unclear. The son and pt. Deny that she ever had a blood clot. They states that she was put on the medication to prevent blood clots in the setting of COVID-19 infection. She was improving at time of discharged, but did require 2L NC at time of discharge which she had been on at [facility] until today.      Hospital Course:  Management of acute respiratory failure with hypoxia due to COVID-19 infection.    Interval History:     Patient lying in bed, mild respiratory distress. Reports SOB improving  but not back to baseline. Cough more controlled. Had questions about blood transfusion before agreeing to receive one unit pRBC.     Review of Systems:  Constitutional: no fever or chills  Respiratory: Positive for cough and SOB  Cardiovascular: no chest pain or palpitations  Gastrointestinal: no nausea or vomiting, no abdominal pain or change in bowel habits  Genitourinary: no hematuria or dysuria  Integument/Breast: no rash or pruritis  Hematologic/Lymphatic: no easy bruising or lymphadenopathy  Musculoskeletal: no arthralgias or myalgias  Neurological: no seizures or tremors  Behavioral/Psych: no depression or anxiety    Inpatient Medications:    Current Facility-Administered Medications:     0.9%  NaCl infusion (for blood administration), , Intravenous, Q24H PRN, Surinder Schrader MD    acetaminophen tablet 650 mg, 650 mg, Oral, Q4H PRN, Lan Cerrato MD    albuterol inhaler 2 puff, 2 puff, Inhalation, Q6H PRN, Lan Cerrato MD, 2 puff at 08/31/20 1810    albuterol-ipratropium 2.5 mg-0.5 mg/3 mL nebulizer solution 3 mL, 3 mL, Nebulization, Q6H WAKE, Lan Cerrato MD, 3 mL at 09/01/20 0836    amLODIPine tablet 10 mg, 10 mg, Oral, Daily, Lan Cerrato MD, 10 mg at 09/01/20 0857    atorvastatin tablet 40 mg, 40 mg, Oral, Daily, Lan Cerrato MD, 40 mg at 09/01/20 0859    benzonatate capsule 100 mg, 100 mg, Oral, TID PRN, Surinder Schrader MD, 100 mg at 08/29/20 1616    cefTRIAXone injection 1 g, 1 g, Intravenous, Q24H, Lan Cerrato MD, 1 g at 09/01/20 0049    cyanocobalamin tablet 500 mcg, 500 mcg, Oral, Daily, Lan Cerrato MD, 500 mcg at 09/01/20 0900    dexAMETHasone tablet 6 mg, 6 mg, Oral, Daily, Surinder Schrader MD, 6 mg at 09/01/20 0857    dextromethorphan-guaifenesin  mg per 12 hr tablet 1 tablet, 1 tablet, Oral, BID, Surinder Schrader MD, 1 tablet at 09/01/20 0857    fluticasone furoate-vilanteroL 100-25 mcg/dose diskus inhaler 1 puff, 1 puff, Inhalation, Daily, Surinder MARTINES  "MD [place], 1 puff at 09/01/20 0835    glucose chewable tablet 16 g, 16 g, Oral, PRN, Lan Cerrato MD    glucose chewable tablet 24 g, 24 g, Oral, PRN, Lan Cerrato MD    hydrALAZINE tablet 25 mg, 25 mg, Oral, QID, Lan Cerrato MD, 25 mg at 09/01/20 0900    levETIRAcetam tablet 500 mg, 500 mg, Oral, BID, Lan Cerrato MD, 500 mg at 09/01/20 0859    melatonin tablet 6 mg, 6 mg, Oral, Nightly PRN, Lan Cerrato MD, 6 mg at 08/29/20 0105    metoprolol tartrate (LOPRESSOR) tablet 25 mg, 25 mg, Oral, BID, Lan Cerrato MD, 25 mg at 09/01/20 0859    ondansetron injection 4 mg, 4 mg, Intravenous, Q8H PRN, Lan Cerrato MD    promethazine (PHENERGAN) 6.25 mg in dextrose 5 % 50 mL IVPB, 6.25 mg, Intravenous, Q6H PRN, Lan Cerrato MD    promethazine-codeine 6.25-10 mg/5 ml syrup 5 mL, 5 mL, Oral, Q4H PRN, Surinder Schrader MD, 5 mL at 08/31/20 2200    sodium chloride 0.9% flush 10 mL, 10 mL, Intravenous, PRN, Koki Mehta MD      Physical Exam:      Intake/Output Summary (Last 24 hours) at 9/1/2020 0947  Last data filed at 8/31/2020 2003  Gross per 24 hour   Intake 1212.08 ml   Output --   Net 1212.08 ml     Wt Readings from Last 3 Encounters:   08/30/20 81.2 kg (179 lb)   08/28/19 77.1 kg (170 lb)   07/28/14 66.2 kg (146 lb)       BP (!) 145/92   Pulse 93   Temp 98.2 °F (36.8 °C)   Resp (!) 24   Ht 4' 11" (1.499 m)   Wt 81.2 kg (179 lb)   SpO2 (!) 94%   Breastfeeding No   BMI 36.15 kg/m²     GEN: mild respiratory distress, conversant  Resp: Decreased breath sounds B/L, no wheezes or rales, normal work of breathing   CV: RRR, II/VI systolic ejection murmur, no edema  GI: soft, NTND  Skin: no rash  Neuro: CN grossly intact, no focal neurologic deficits    Laboratory:  No results found for: [id number]    Recent Labs   Lab 08/29/20  0343 08/31/20  0306 09/01/20  0423   WBC 7.05 9.31 11.26   LYMPH 8.5*  0.6* 14.4*  1.3 12.3*  1.4   HGB 7.3* 6.6* 9.3*   HCT 24.2* 23.0* 28.8*   PLT " 203 230 258     Recent Labs   Lab 08/29/20  0343 08/30/20  0855 09/01/20  0423    139 137   K 4.4 4.6 5.0    102 101   CO2 25 27 27   BUN 12 19 24*   CREATININE 0.8 1.0 0.8   * 122* 132*   CALCIUM 8.9 8.9 8.8     Recent Labs   Lab 08/28/20  1939 08/29/20  0343 08/30/20  0855   ALKPHOS 70 76 63   ALT 20 21 20   AST 21 27 28   ALBUMIN 2.6* 2.7* 2.7*   PROT 6.8 7.3 7.3   BILITOT 0.7 0.5 0.4        Recent Labs     09/01/20  0423   FERRITIN 287   CRP 34.7*       All labs within the last 24 hours were reviewed.     Microbiology:  Microbiology Results (last 7 days)     ** No results found for the last 168 hours. **            Imaging  ECG Results          Repeat EKG 12-lead (Final result)  Result time 08/29/20 22:21:04    Final result by Interface, Lab In [facility] (08/29/20 22:21:04)                 Narrative:    Test Reason : R79.89,    Vent. Rate : 073 BPM     Atrial Rate : 073 BPM     P-R Int : 152 ms          QRS Dur : 072 ms      QT Int : 402 ms       P-R-T Axes : 105 060 144 degrees     QTc Int : 442 ms    Baseline Artifact  Normal sinus rhythm  Nonspecific ST and/or T wave abnormalities  Abnormal ECG  When compared with ECG of 28-AUG-2020 19:07,  No significant change was found  Confirmed by Ruddy Ruby MD ([id number]) on 8/29/2020 10:20:54 PM    Referred By: System System           Confirmed By:Ruddy Ruby MD                             EKG 12-lead (Final result)  Result time 08/29/20 22:08:57    Final result by Interface, Lab In [facility] (08/29/20 22:08:57)                 Narrative:    Test Reason : R68.89,    Vent. Rate : 068 BPM     Atrial Rate : 068 BPM     P-R Int : 152 ms          QRS Dur : 078 ms      QT Int : 414 ms       P-R-T Axes : 002 051 142 degrees     QTc Int : 440 ms    Baseline Artifact  Baseline wander  Normal sinus rhythm  ST-t wave changes of strain or ischemia  Abnormal ECG  When compared with ECG of 29-JUL-2014 06:14,  No significant change was found  Confirmed by Tung DENNIS, Ruddy  ([id number]) on 8/29/2020 10:08:46 PM    Referred By: System System           Confirmed By:Ruddy Ruby MD                              No results found for this or any previous visit.    Echo Color Flow Doppler? Yes  · Normal left ventricular systolic function. The estimated ejection   fraction is 65%.  · Grade II (moderate) left ventricular diastolic dysfunction consistent   with pseudonormalization.  · No wall motion abnormalities.  · Normal right ventricular systolic function.  · Mild left atrial enlargement.  · Aortic valve area is 1.02 cm2; peak velocity is 3.55 m/s; mean gradient   is 27 mmHg.  · Moderate aortic valve stenosis.  · Mild mitral regurgitation.  · Mild tricuspid regurgitation.  · The estimated PA systolic pressure is 42 mmHg.  · Pulmonary hypertension present.  · Intermediate central venous pressure (8 mmHg).  · There is a right pleural effusion.         All imaging within the last 24 hours was reviewed.     Assessment and Plan:    Active Hospital Problems    Diagnosis  POA    *Acute hypoxemic respiratory failure [J96.01]  Yes    COVID-19 [U07.1]  Yes    COPD exacerbation [J44.1]  Yes    Anemia [D64.9]  Yes      Resolved Hospital Problems   No resolved problems to display.       Scheduled Meds:   albuterol-ipratropium  3 mL Nebulization Q6H WAKE    amLODIPine  10 mg Oral Daily    atorvastatin  40 mg Oral Daily    cefTRIAXone (ROCEPHIN) IVPB  1 g Intravenous Q24H    cyanocobalamin  500 mcg Oral Daily    dexAMETHasone  6 mg Oral Daily    dextromethorphan-guaifenesin  mg  1 tablet Oral BID    fluticasone furoate-vilanteroL  1 puff Inhalation Daily    hydrALAZINE  25 mg Oral QID    levETIRAcetam  500 mg Oral BID    metoprolol tartrate  25 mg Oral BID     Continuous Infusions:  PRN Meds:.sodium chloride, acetaminophen, albuterol, benzonatate, glucose, glucose, melatonin, ondansetron, promethazine (PHENERGAN) IVPB, promethazine-codeine 6.25-10 mg/5 ml, sodium chloride 0.9%      COVID-19  Virus Infection  Viral Pneumonia due to COVID-19    - COVID-19 testing: positive on 8/5/20 at [facility]  - Isolation: Airborne/Droplet. Surgical mask on patient. Notify Infection Control  - Diagnostics: Trend Q48hrs if stable, more frequently if patient decompensating     Laboratory/Study Frequency Result   CBC Admit & Q48h    CMP Admit & Q48h    D-dimer Admit & Q48h    Ferritin Admit & Q48h    CRP Admit & Q48h    CPK Admit & Q24h if elevated    LDH Admit & Q48h    Vitamin D Admit    BNP Admit    Troponin Admit & Q6h if elevated    Procalcitonin Admit      Lymphopenia, hyponatremia, hyperferritinemia, elevated troponin, elevated d-dimer, age, and medical comorbidities are significant predictors of poor clinical outcome    - Management: per [facility] COVID Treatment Protocol (4/15/20)    - Monitoring:   - Telemetry & Continuous Pulse Oximetry if > 3L via NC    - Nutrition:    - Multivitamin PO daily   - Boost supplement   - Vitamin D 1000IU daily if deficient   - Ascorbic acid 500mg PO bid    - Supportive Care:   - acetaminophen 650mg PO Q6hr PRN fever/headache   - loperamide PRN viral diarrhea   - IVF if indicated, restrictive strategy preferred, no maintenance IV if able   - VTE PPx: enoxaparin or heparin SQ unless contraindicated    - Antibiotics:   - ceftriaxone 1g Q24h x 5 days  - azithromycin 500mg IV/PO x3    - Investigational Therapies:  - atorvastatin 40mg po daily  - per patient's son, received patient received remdesivir, steroids, and convalescent plasma at [facility]    Acute Hypoxemic Respiratory Failure  - On 3L NC. Wean O2 as tolerated  - Order RT consult via Respiratory Communication for COVID Protocols  - Order Incentive Spirometer Q4h  - Order Flutter Valve Q4h  - Continuous Pulse Oximetry  - Goal SpO2 92-96%  - Supplemental O2 via LFNC, VentiMask, or HFNC (see Respiratory Support Oxygen Therapies)  - If wheezing, albuterol INH Q6h scheduled & PRN  - Proning Protocol if patient is a candidate (see HM Proning  "Protocol)   - GCS >13, able to self-prone  - If deterioration, may warrant trial of NIPPV and transfer to [facility] pressure room or immediate ICU consult  - continue dexamethasone  - codeine syrup prn for cough     COPD exacerbation- improving   - duonebs  - Breo   - on dexamethasone   - wean O2 as tolerated     Anemia  - Hgb 7.0 on presentation, baseline unclear. Pt. Denies any melena or hematochezia  - Pt. Recently started On xarelto per SNF paperwork, but no diagnosis of blood clot listed and pt. Denies ever having one  - CTA ordered to rule out PE  - will hold xarelto in meantime given the anemia  - Continue home B12 supplementation  - Monitor CBC and transfuse to goal >7.   - transfused one unit pRBC on 8/31 for Hb 6.6    Anticoagulation use  - on Xarelto at home  - family report that patient was prescribed Xarelto as ppx in the setting of COVID infection   - holding Xarelto due to anemia  - US upper and lower extremities negative for DVT  - no h/o afib   - followup with PCP to discuss resuming Xarelto     HTN  - Continue home meds norvasc, hydralazine, and lopressor     Hx of CAD  - Per SNF paperwork, full history unclear  - Pt. Denies any active chest pain, no ST changes compared to prior EKG  -Troponin mildly elevated at 0.092, but this appears to be chronic and is not surprising in setting of demand from hypoxia.   - Low-suspicion for ACS, but will continue to trend troponin.   - TTE ordered given the patient's reported cardiac history and acitve hypoxia without a baseline/recent TTE in system  -Continue statin        Advance Care Planning  Goals of care, counseling/discussion: Full code   Advance Care Planning     If patient transitions to Comfort-Focused Care, please place "Nurse Communication: End of Life Care, family members allowed to visit, including spouse/partner and adult children [please list names]. Please ask family to visit as a group and leave as a group.       VTE High Risk Prophylaxis:   VTE Risk " Mitigation (From admission, onward)         Ordered     IP VTE HIGH RISK PATIENT  Once      08/28/20 2223     Place sequential compression device  Until discontinued      08/28/20 2223                  Subsequent Inpatient Hospital Care      Level 3 [id number] Total visit time was 35 minutes or greater with greater than 50% of time spent in counseling and coordination of care.             Surinder Schrader MD  Hospital Medicine Staff  Pager: [phone]; Spectra: [phone]

## 2020-09-01 NOTE — PROGRESS NOTES
Hospital Medicine  Progress Note  [facility]      Patient Name: Melvi Conrad  MRN:  [id number]  Hospital Medicine Team: [facility] Surinder Schrader MD  Date of Admission:  8/28/2020     Length of Stay:  LOS: 4 days       Principal Problem:  Acute hypoxemic respiratory failure      HPI:  73 yo F with PMHx HTN, CKD3, COPD on 2L NC, epilepsy, and CAD presents to the hospital from [facility] with worsening SOB 1 week after being discharged from [facility] for COVID-19 pneumonia. The patient reports that over the last 2 days at the [facility], she has developed a progressive decline in her breathing. She reports that she was unable to participate much with therapy today and yesterday 2/2 SOB. She reports that she had been unable to receive her nebulizer treatments while in the SNF due to concerns of aerosolization of the virus, and she believes this significantly affected her recovery. She denies any chest pain, lightheadedness, palpitations, fevers, chills, or new cough.       Per son, the pateint was admitted to [facility] for symptoms of difficulty breathing, coughing, and fatigue. She tested positive for COVID-19 on admission and was admitted from 8/5- 8/21. Pt. And son reports that she spent the majority of her stay in the ICU, but she was never intubated. She received remdesivir, steroids, and convalescent plasma per son. Pt. Was also reportedly started on xarelto, but the reasoning is unclear. The son and pt. Deny that she ever had a blood clot. They states that she was put on the medication to prevent blood clots in the setting of COVID-19 infection. She was improving at time of discharged, but did require 2L NC at time of discharge which she had been on at [facility] until today.      Hospital Course:  Management of acute respiratory failure with hypoxia due to COVID-19 infection.    Interval History:     Patient sitting in chair, mild respiratory distress. Reports SOB  improving but not back to baseline. Reports decreased dyspnea on exertion. No other complaints. Eating well, slept better, no dizziness on ambulation, had a regular bowel movement, and tolerating diet.     Review of Systems:  Constitutional: no fever or chills  Respiratory: Positive for cough and SOB  Cardiovascular: no chest pain or palpitations  Gastrointestinal: no nausea or vomiting, no abdominal pain or change in bowel habits  Genitourinary: no hematuria or dysuria  Integument/Breast: no rash or pruritis  Hematologic/Lymphatic: no easy bruising or lymphadenopathy  Musculoskeletal: no arthralgias or myalgias  Neurological: no seizures or tremors  Behavioral/Psych: no depression or anxiety    Inpatient Medications:    Current Facility-Administered Medications:     0.9%  NaCl infusion (for blood administration), , Intravenous, Q24H PRN, Surinder Schrader MD    acetaminophen tablet 650 mg, 650 mg, Oral, Q4H PRN, Lan Cerrato MD    albuterol inhaler 2 puff, 2 puff, Inhalation, Q6H PRN, Lan Cerrato MD, 2 puff at 08/31/20 1810    albuterol-ipratropium 2.5 mg-0.5 mg/3 mL nebulizer solution 3 mL, 3 mL, Nebulization, Q6H WAKE, Lan Cerrato MD, 3 mL at 09/01/20 0836    amLODIPine tablet 10 mg, 10 mg, Oral, Daily, Lan Cerrato MD, 10 mg at 09/01/20 0857    atorvastatin tablet 40 mg, 40 mg, Oral, Daily, Lan Cerrato MD, 40 mg at 09/01/20 0859    benzonatate capsule 100 mg, 100 mg, Oral, TID PRN, Surinder Schrader MD, 100 mg at 08/29/20 1616    cefTRIAXone injection 1 g, 1 g, Intravenous, Q24H, Lan Cerrato MD, 1 g at 09/01/20 0049    cyanocobalamin tablet 500 mcg, 500 mcg, Oral, Daily, Lan Cerrato MD, 500 mcg at 09/01/20 0900    dexAMETHasone tablet 6 mg, 6 mg, Oral, Daily, Surinder Schrader MD, 6 mg at 09/01/20 0857    dextromethorphan-guaifenesin  mg per 12 hr tablet 1 tablet, 1 tablet, Oral, BID, Surinder Schrader MD, 1 tablet at 09/01/20 0857    fluticasone furoate-vilanteroL  "100-25 mcg/dose diskus inhaler 1 puff, 1 puff, Inhalation, Daily, Surinder Schrader MD, 1 puff at 09/01/20 0835    glucose chewable tablet 16 g, 16 g, Oral, PRN, Lan Cerrato MD    glucose chewable tablet 24 g, 24 g, Oral, PRN, Lan Cerrato MD    hydrALAZINE tablet 25 mg, 25 mg, Oral, QID, Lan Cerrato MD, 25 mg at 09/01/20 0900    levETIRAcetam tablet 500 mg, 500 mg, Oral, BID, Lan Cerrato MD, 500 mg at 09/01/20 0859    melatonin tablet 6 mg, 6 mg, Oral, Nightly PRN, Lan Cerrato MD, 6 mg at 08/29/20 0105    metoprolol tartrate (LOPRESSOR) tablet 25 mg, 25 mg, Oral, BID, Lan Cerrato MD, 25 mg at 09/01/20 0859    ondansetron injection 4 mg, 4 mg, Intravenous, Q8H PRN, Lan Cerrato MD    promethazine (PHENERGAN) 6.25 mg in dextrose 5 % 50 mL IVPB, 6.25 mg, Intravenous, Q6H PRN, Lan Cerrato MD    promethazine-codeine 6.25-10 mg/5 ml syrup 5 mL, 5 mL, Oral, Q4H PRN, Surinder Schrader MD, 5 mL at 08/31/20 2200    sodium chloride 0.9% flush 10 mL, 10 mL, Intravenous, PRN, Koki Mehta MD      Physical Exam:      Intake/Output Summary (Last 24 hours) at 9/1/2020 0948  Last data filed at 8/31/2020 2003  Gross per 24 hour   Intake 1212.08 ml   Output --   Net 1212.08 ml     Wt Readings from Last 3 Encounters:   08/30/20 81.2 kg (179 lb)   08/28/19 77.1 kg (170 lb)   07/28/14 66.2 kg (146 lb)       BP (!) 145/92   Pulse 93   Temp 98.2 °F (36.8 °C)   Resp (!) 24   Ht 4' 11" (1.499 m)   Wt 81.2 kg (179 lb)   SpO2 (!) 94%   Breastfeeding No   BMI 36.15 kg/m²     GEN: mild respiratory distress, conversant  Resp: Decreased breath sounds B/L, no wheezes or rales, normal work of breathing   CV: RRR, II/VI systolic ejection murmur, no edema  GI: soft, NTND  Skin: no rash  Neuro: CN grossly intact, no focal neurologic deficits    Laboratory:  No results found for: [id number]    Recent Labs   Lab 08/29/20  0343 08/31/20  0306 09/01/20  0423   WBC 7.05 9.31 11.26   LYMPH 8.5*  " 0.6* 14.4*  1.3 12.3*  1.4   HGB 7.3* 6.6* 9.3*   HCT 24.2* 23.0* 28.8*    230 258     Recent Labs   Lab 08/29/20  0343 08/30/20  0855 09/01/20  0423    139 137   K 4.4 4.6 5.0    102 101   CO2 25 27 27   BUN 12 19 24*   CREATININE 0.8 1.0 0.8   * 122* 132*   CALCIUM 8.9 8.9 8.8     Recent Labs   Lab 08/28/20  1939 08/29/20  0343 08/30/20  0855   ALKPHOS 70 76 63   ALT 20 21 20   AST 21 27 28   ALBUMIN 2.6* 2.7* 2.7*   PROT 6.8 7.3 7.3   BILITOT 0.7 0.5 0.4        Recent Labs     09/01/20  0423   FERRITIN 287   CRP 34.7*       All labs within the last 24 hours were reviewed.     Microbiology:  Microbiology Results (last 7 days)     ** No results found for the last 168 hours. **            Imaging  ECG Results          Repeat EKG 12-lead (Final result)  Result time 08/29/20 22:21:04    Final result by Interface, Lab In [facility] (08/29/20 22:21:04)                 Narrative:    Test Reason : R79.89,    Vent. Rate : 073 BPM     Atrial Rate : 073 BPM     P-R Int : 152 ms          QRS Dur : 072 ms      QT Int : 402 ms       P-R-T Axes : 105 060 144 degrees     QTc Int : 442 ms    Baseline Artifact  Normal sinus rhythm  Nonspecific ST and/or T wave abnormalities  Abnormal ECG  When compared with ECG of 28-AUG-2020 19:07,  No significant change was found  Confirmed by Ruddy Ruby MD ([id number]) on 8/29/2020 10:20:54 PM    Referred By: System System           Confirmed By:Ruddy Ruby MD                             EKG 12-lead (Final result)  Result time 08/29/20 22:08:57    Final result by Interface, Lab In [facility] (08/29/20 22:08:57)                 Narrative:    Test Reason : R68.89,    Vent. Rate : 068 BPM     Atrial Rate : 068 BPM     P-R Int : 152 ms          QRS Dur : 078 ms      QT Int : 414 ms       P-R-T Axes : 002 051 142 degrees     QTc Int : 440 ms    Baseline Artifact  Baseline wander  Normal sinus rhythm  ST-t wave changes of strain or ischemia  Abnormal ECG  When compared with ECG of  29-JUL-2014 06:14,  No significant change was found  Confirmed by Tung DENNIS, Ruddy ([id number]) on 8/29/2020 10:08:46 PM    Referred By: System System           Confirmed By:Ruddy Ruby MD                              No results found for this or any previous visit.    Echo Color Flow Doppler? Yes  · Normal left ventricular systolic function. The estimated ejection   fraction is 65%.  · Grade II (moderate) left ventricular diastolic dysfunction consistent   with pseudonormalization.  · No wall motion abnormalities.  · Normal right ventricular systolic function.  · Mild left atrial enlargement.  · Aortic valve area is 1.02 cm2; peak velocity is 3.55 m/s; mean gradient   is 27 mmHg.  · Moderate aortic valve stenosis.  · Mild mitral regurgitation.  · Mild tricuspid regurgitation.  · The estimated PA systolic pressure is 42 mmHg.  · Pulmonary hypertension present.  · Intermediate central venous pressure (8 mmHg).  · There is a right pleural effusion.         All imaging within the last 24 hours was reviewed.     Assessment and Plan:    Active Hospital Problems    Diagnosis  POA    *Acute hypoxemic respiratory failure [J96.01]  Yes    COVID-19 [U07.1]  Yes    COPD exacerbation [J44.1]  Yes    Anemia [D64.9]  Yes      Resolved Hospital Problems   No resolved problems to display.       Scheduled Meds:   albuterol-ipratropium  3 mL Nebulization Q6H WAKE    amLODIPine  10 mg Oral Daily    atorvastatin  40 mg Oral Daily    cefTRIAXone (ROCEPHIN) IVPB  1 g Intravenous Q24H    cyanocobalamin  500 mcg Oral Daily    dexAMETHasone  6 mg Oral Daily    dextromethorphan-guaifenesin  mg  1 tablet Oral BID    fluticasone furoate-vilanteroL  1 puff Inhalation Daily    hydrALAZINE  25 mg Oral QID    levETIRAcetam  500 mg Oral BID    metoprolol tartrate  25 mg Oral BID     Continuous Infusions:  PRN Meds:.sodium chloride, acetaminophen, albuterol, benzonatate, glucose, glucose, melatonin, ondansetron, promethazine (PHENERGAN)  IVPB, promethazine-codeine 6.25-10 mg/5 ml, sodium chloride 0.9%      COVID-19 Virus Infection  Viral Pneumonia due to COVID-19    - COVID-19 testing: positive on 8/5/20 at [facility]  - Isolation: Airborne/Droplet. Surgical mask on patient. Notify Infection Control  - Diagnostics: Trend Q48hrs if stable, more frequently if patient decompensating     Laboratory/Study Frequency Result   CBC Admit & Q48h    CMP Admit & Q48h    D-dimer Admit & Q48h    Ferritin Admit & Q48h    CRP Admit & Q48h    CPK Admit & Q24h if elevated    LDH Admit & Q48h    Vitamin D Admit    BNP Admit    Troponin Admit & Q6h if elevated    Procalcitonin Admit      Lymphopenia, hyponatremia, hyperferritinemia, elevated troponin, elevated d-dimer, age, and medical comorbidities are significant predictors of poor clinical outcome    - Management: per [facility] COVID Treatment Protocol (4/15/20)    - Monitoring:   - Telemetry & Continuous Pulse Oximetry if > 3L via NC    - Nutrition:    - Multivitamin PO daily   - Boost supplement   - Vitamin D 1000IU daily if deficient   - Ascorbic acid 500mg PO bid    - Supportive Care:   - acetaminophen 650mg PO Q6hr PRN fever/headache   - loperamide PRN viral diarrhea   - IVF if indicated, restrictive strategy preferred, no maintenance IV if able   - VTE PPx: enoxaparin or heparin SQ unless contraindicated    - Antibiotics:   - ceftriaxone 1g Q24h x 5 days  - azithromycin 500mg IV/PO x3    - Investigational Therapies:  - atorvastatin 40mg po daily  - per patient's son, received patient received remdesivir, steroids, and convalescent plasma at [facility]    Acute Hypoxemic Respiratory Failure  - On 3L NC. Wean O2 as tolerated  - Order RT consult via Respiratory Communication for COVID Protocols  - Order Incentive Spirometer Q4h  - Order Flutter Valve Q4h  - Continuous Pulse Oximetry  - Goal SpO2 92-96%  - Supplemental O2 via LFNC, VentiMask, or HFNC (see Respiratory Support Oxygen Therapies)  - If wheezing, albuterol INH Q6h  "scheduled & PRN  - Proning Protocol if patient is a candidate (see  Proning Protocol)   - GCS >13, able to self-prone  - If deterioration, may warrant trial of NIPPV and transfer to [facility] pressure room or immediate ICU consult  - continue dexamethasone  - codeine syrup prn for cough     COPD exacerbation- improving   - getachew  - Breo   - on dexamethasone   - wean O2 as tolerated     Anemia  - Hgb 7.0 on presentation, baseline unclear. Pt. Denies any melena or hematochezia  - Pt. Recently started On xarelto per SNF paperwork, but no diagnosis of blood clot listed and pt. Denies ever having one  - CTA ordered to rule out PE  - will hold xarelto in meantime given the anemia  - Continue home B12 supplementation  - Monitor CBC and transfuse to goal >7.   - transfused one unit pRBC on 8/31 for Hb 6.6. Hb today 9.3    Anticoagulation use  - on Xarelto at home  - family report that patient was prescribed Xarelto as ppx in the setting of COVID infection   - holding Xarelto due to anemia  - US upper and lower extremities negative for DVT  - no h/o afib   - followup with PCP to discuss resuming Xarelto     HTN  - Continue home meds norvasc, hydralazine, and lopressor     Hx of CAD  - Per SNF paperwork, full history unclear  - Pt. Denies any active chest pain, no ST changes compared to prior EKG  -Troponin mildly elevated at 0.092, but this appears to be chronic and is not surprising in setting of demand from hypoxia.   - Low-suspicion for ACS, but will continue to trend troponin.   - TTE ordered given the patient's reported cardiac history and acitve hypoxia without a baseline/recent TTE in system  -Continue statin        Advance Care Planning  Goals of care, counseling/discussion: Full code   Advance Care Planning     If patient transitions to Comfort-Focused Care, please place "Nurse Communication: End of Life Care, family members allowed to visit, including spouse/partner and adult children [please list names]. Please ask " family to visit as a group and leave as a group.       VTE High Risk Prophylaxis:   VTE Risk Mitigation (From admission, onward)         Ordered     IP VTE HIGH RISK PATIENT  Once      08/28/20 2223     Place sequential compression device  Until discontinued      08/28/20 2223                  Subsequent Inpatient Hospital Care      Level 3 [id number] Total visit time was 35 minutes or greater with greater than 50% of time spent in counseling and coordination of care.             Surinder Schrader MD  Hospital Medicine Staff  Pager: [phone]; Spectra: [phone]

## 2020-09-01 NOTE — PT/OT/SLP PROGRESS
Occupational Therapy   Treatment    Name: Melvi Conrad  MRN: [id number]  Admitting Diagnosis:  Acute hypoxemic respiratory failure       Recommendations:     Discharge Recommendations: nursing facility, skilled  Discharge Equipment Recommendations:  other (see comments)(tbd pending progress)  Barriers to discharge:  Inaccessible home environment    Assessment:     Melvi Conrad is a 74 y.o. female with a medical diagnosis of Acute hypoxemic respiratory failure.  Pt presents with decreased endurance and impaired mobility performance as limited by cardiovascular status and generalized weakness. Pt found UIC and very eager to mobilize with OT/PT. Session focus included x2 trials of mobility this date in bedroom with chair follow for safety. Pt making excellent progress in overall mobility tolerance however continues to depict deficits in desaturation of 02. Pt required CGA for mobility using RW however desat to 70-80s while on 3L 02. Pt's RN aware of pt's desaturation however pt exhibited excellent usage of pursed lip breathing to improve 02 following mobility. At this time, pt is not safe to return home and continues to voice appreciation of therapy.  Performance deficits affecting function are weakness, impaired self care skills, impaired endurance, impaired functional mobilty, gait instability, impaired cardiopulmonary response to activity, impaired balance. Pt would benefit from continued OT skilled services 4x/wk to improve daily living skills to optimize QOL.  Pt is recommended to discharge to SNF at this time.      Rehab Prognosis:  Good; patient would benefit from acute skilled OT services to address these deficits and reach maximum level of function.       Plan:     Patient to be seen 4 x/week to address the above listed problems via self-care/home management, therapeutic activities, therapeutic exercises  · Plan of Care Expires: 09/29/20  · Plan of Care Reviewed with: patient    Subjective      Pain/Comfort:  · Pain Rating 1: 0/10  · Pain Rating Post-Intervention 2: 0/10    Objective:     Communicated with: RN prior to session.  Patient found up in chair with blood pressure cuff, telemetry, oxygen, pulse ox (continuous) upon OT entry to room.    General Precautions: Standard, airborne, droplet, fall, contact   Orthopedic Precautions:N/A   Braces: N/A     Occupational Performance:     Bed Mobility:    · NT as pt found UIC     Functional Mobility/Transfers:  · Patient completed Sit <> Stand Transfer with contact guard assistance  with  rolling walker   · Functional Mobility: Pt completed x2 trials of sit<stand with RW. Pt required CGA and desat to high 70s-80s following each mobility assessment. Pt mobilized to bedroom sink requiring seated rest break 2/2 02.    Activities of Daily Living:  · Feeding:  setup (A) with fresh drinking water and cups provided  · Grooming: setup (A) while UIC to wash face and brush teeth. pt required rest breaks during task for 02 needs. Pt also provided comb for pt to style hair while UIC   · Upper Body Dressing: minimum assistance adjusting gown fit around back in standing       [facility] 6 Click ADL: 18    Treatment & Education:  Pt educated on role of occupational therapy, POC, and safety during ADLs and functional mobility. Pt and OT discussed importance of safe, continued mobility to optimize daily living skills. Pt verbalized understanding.   Pt completed the following during session: sit<stand t/f, bedroom mobility (x2 trials with rest break once at sink; chair follow used for 02 safety), pursed lip breathing exercises/education on cardiovascular needs, grooming tasks, provided additional grooming care needs and drinking items. White board updated during session. Pt given instruction to call for medical staff/nurse for assistance.       Patient left up in chair with all lines intact, call button in reach and RN Ted notifiedEducation:      GOALS:   Multidisciplinary  Problems     Occupational Therapy Goals        Problem: Occupational Therapy Goal    Goal Priority Disciplines Outcome Interventions   Occupational Therapy Goal     OT, PT/OT Ongoing, Progressing    Description: Goals to be met by: 9/29/2020     Patient will increase functional independence with ADLs by performing:    UE Dressing with [place].  LE Dressing with [place].  Grooming while standing with Stand-by Assistance.  Toileting from toilet with Stand-by Assistance for hygiene and clothing management.   Rolling to Bilateral with [place].   Supine to sit with [place].  Step transfer with Stand-by Assistance  Toilet transfer to toilet with Stand-by Assistance.  Pt to demonstrate pursed lip breathing for ADL tasks to reflect Sp02 WNL.  Pt to complete ADL tasks with <1 rest break for cardiovascular needs.                   Time Tracking:     OT Date of Treatment: 09/01/20  OT Start Time: 0921  OT Stop Time: 0945  OT Total Time (min): 24 min    Billable Minutes:Self Care/Home Management 24 min    Rashmi Calixto OT  9/1/2020

## 2020-09-01 NOTE — PT/OT/SLP PROGRESS
Physical Therapy Treatment    Patient Name:  Melvi Conrad   MRN:  [id number]    Recommendations:     Discharge Recommendations:  nursing facility, skilled   Discharge Equipment Recommendations: (TBD)   Barriers to discharge: impaired cardiopulmonary response to activity     Assessment:     Melvi Conrad is a 74 y.o. female admitted with a medical diagnosis of Acute hypoxemic respiratory failure.  She presents with the following impairments/functional limitations:  weakness, impaired endurance, impaired self care skills, impaired functional mobilty, decreased lower extremity function, gait instability, impaired balance, impaired cardiopulmonary response to activity. Pt treated on this date tolerating fairly. At rest pt found on 3L O2 SpO2 reading 88-92% decreasing to 70's during first gait trial and low 80's during second gait trial. Pt requires extended periods of seated rest to recover. Pt would benefit from continued skilled acute PT 3x/wk to improve functional mobility.  Recommending pt receive PT services in SNF setting following discharge from hospital once medically cleared.     Rehab Prognosis: Fair; patient would benefit from acute skilled PT services to address these deficits and reach maximum level of function.    Recent Surgery: * No surgery found *      Plan:     During this hospitalization, patient to be seen 3 x/week to address the identified rehab impairments via gait training, therapeutic activities, therapeutic exercises, neuromuscular re-education and progress toward the following goals:    · Plan of Care Expires:  09/28/20    Subjective     Chief Complaint: ARENAS  Patient/Family Comments/goals: Pt pleasant and willing to participate with therapy on this date.    Pain/Comfort:  ·        Objective:     Communicated with NSG prior to session.  Patient found up in chair with blood pressure cuff, pulse ox (continuous), telemetry, oxygen upon PT entry to room.     General Precautions: Standard, airborne,  contact, droplet, fall   Orthopedic Precautions:N/A   Braces:       Functional Mobility:  · Transfers:     · Sit to Stand:  stand by assistance with rolling walker  · Gait: 12ft x2 CGA w/RW. pt on 3L O2 SpO2 reading 88-92% decreasing to 70's during first gait trial and low 80's during second gait trial  · Balance: Sitting: SBA    Standing: CGA      AM-PAC 6 CLICK MOBILITY  Turning over in bed (including adjusting bedclothes, sheets and blankets)?: 3  Sitting down on and standing up from a chair with arms (e.g., wheelchair, bedside commode, etc.): 3  Moving from lying on back to sitting on the side of the bed?: 3  Moving to and from a bed to a chair (including a wheelchair)?: 3  Need to walk in hospital room?: 3  Climbing 3-5 steps with a railing?: 2  Basic Mobility Total Score: 17       Therapeutic Activities and Exercises:   - Sit-to-Stand x2 trials SBA w/RW  - Pt educated on:   -PT roles, expectations, and POC    -Safety with mobility   -Benefits of OOB activities to increase strength and functional mobility    -Performing ther ex for increasing LE ROM and strength   -Discharge recommendations     Patient left up in chair with all lines intact and call button in reach..    GOALS:   Multidisciplinary Problems     Physical Therapy Goals        Problem: Physical Therapy Goal    Goal Priority Disciplines Outcome Goal Variances Interventions   Physical Therapy Goal     PT, PT/OT Ongoing, Progressing     Description: Goals to be met by: 2020    Patient will increase functional independence with mobility by performin. Supine to sit with Modified [place]  2. Sit to supine with Modified [place]  3. Sit to stand transfer with Modified [place]  4. Gait  x 20 feet with Stand-by Assistance using LRAD  5. Lower extremity exercise program x15 reps, with independence                      Time Tracking:     PT Received On: 20  PT Start Time: 921     PT Stop Time: 945  PT Total Time (min): 24 min      Billable Minutes: Gait Training 10 and Therapeutic Activity 14    Treatment Type: Treatment  PT/PTA: PT           Jj Resendez, PT  09/01/2020

## 2020-09-01 NOTE — PLAN OF CARE
DX:Covid with acute hypoxic resp failure     Shift Events:v/s q 8hrs and monitor [id number] new Ivs placed and pt received opne unit of blood     Plan of Care:monitor covid s/s and resolve hypoxia, abt therapy     Neuro: A/O x4     Respiratory:2-3 liters     Cardiac:N/S     Diet:cardiac      Gtts: azithromycin complete and ceftriaxone     Skin:bruising

## 2020-09-02 LAB
BASOPHILS # BLD AUTO: 0.03 K/UL (ref 0–0.2)
BASOPHILS NFR BLD: 0.3 % (ref 0–1.9)
DIFFERENTIAL METHOD: ABNORMAL
EOSINOPHIL # BLD AUTO: 0.1 K/UL (ref 0–0.5)
EOSINOPHIL NFR BLD: 0.6 % (ref 0–8)
ERYTHROCYTE [DISTWIDTH] IN BLOOD BY AUTOMATED COUNT: 20.8 % (ref 11.5–14.5)
HCT VFR BLD AUTO: 29.8 % (ref 37–48.5)
HGB BLD-MCNC: 8.9 G/DL (ref 12–16)
IMM GRANULOCYTES # BLD AUTO: 0.17 K/UL (ref 0–0.04)
IMM GRANULOCYTES NFR BLD AUTO: 1.9 % (ref 0–0.5)
LYMPHOCYTES # BLD AUTO: 1.5 K/UL (ref 1–4.8)
LYMPHOCYTES NFR BLD: 16.5 % (ref 18–48)
MCH RBC QN AUTO: 31.8 PG (ref 27–31)
MCHC RBC AUTO-ENTMCNC: 29.9 G/DL (ref 32–36)
MCV RBC AUTO: 106 FL (ref 82–98)
MONOCYTES # BLD AUTO: 0.7 K/UL (ref 0.3–1)
MONOCYTES NFR BLD: 7.9 % (ref 4–15)
NEUTROPHILS # BLD AUTO: 6.6 K/UL (ref 1.8–7.7)
NEUTROPHILS NFR BLD: 72.8 % (ref 38–73)
NRBC BLD-RTO: 3 /100 WBC
PLATELET # BLD AUTO: 235 K/UL (ref 150–350)
PMV BLD AUTO: 10.7 FL (ref 9.2–12.9)
RBC # BLD AUTO: 2.8 M/UL (ref 4–5.4)
WBC # BLD AUTO: 9.07 K/UL (ref 3.9–12.7)

## 2020-09-02 PROCEDURE — 25000003 PHARM REV CODE 250: Performed by: HOSPITALIST

## 2020-09-02 PROCEDURE — 85025 COMPLETE CBC W/AUTO DIFF WBC: CPT

## 2020-09-02 PROCEDURE — 11000001 HC ACUTE MED/SURG PRIVATE ROOM

## 2020-09-02 PROCEDURE — 25000242 PHARM REV CODE 250 ALT 637 W/ HCPCS: Performed by: HOSPITALIST

## 2020-09-02 PROCEDURE — 25000003 PHARM REV CODE 250: Performed by: INTERNAL MEDICINE

## 2020-09-02 PROCEDURE — 99232 PR SUBSEQUENT HOSPITAL CARE,LEVL II: ICD-10-PCS | Mod: ,,, | Performed by: INTERNAL MEDICINE

## 2020-09-02 PROCEDURE — 94640 AIRWAY INHALATION TREATMENT: CPT

## 2020-09-02 PROCEDURE — 94761 N-INVAS EAR/PLS OXIMETRY MLT: CPT

## 2020-09-02 PROCEDURE — 99232 SBSQ HOSP IP/OBS MODERATE 35: CPT | Mod: ,,, | Performed by: INTERNAL MEDICINE

## 2020-09-02 PROCEDURE — 27000221 HC OXYGEN, UP TO 24 HOURS

## 2020-09-02 PROCEDURE — 99900035 HC TECH TIME PER 15 MIN (STAT)

## 2020-09-02 PROCEDURE — 63600175 PHARM REV CODE 636 W HCPCS: Performed by: HOSPITALIST

## 2020-09-02 PROCEDURE — 63600175 PHARM REV CODE 636 W HCPCS: Performed by: INTERNAL MEDICINE

## 2020-09-02 PROCEDURE — 36415 COLL VENOUS BLD VENIPUNCTURE: CPT

## 2020-09-02 RX ADMIN — DEXAMETHASONE 6 MG: 4 TABLET ORAL at 09:09

## 2020-09-02 RX ADMIN — LEVETIRACETAM 500 MG: 500 TABLET ORAL at 09:09

## 2020-09-02 RX ADMIN — HYDRALAZINE HYDROCHLORIDE 25 MG: 25 TABLET ORAL at 09:09

## 2020-09-02 RX ADMIN — IPRATROPIUM BROMIDE AND ALBUTEROL SULFATE 3 ML: .5; 2.5 SOLUTION RESPIRATORY (INHALATION) at 08:09

## 2020-09-02 RX ADMIN — METOPROLOL TARTRATE 25 MG: 25 TABLET, FILM COATED ORAL at 09:09

## 2020-09-02 RX ADMIN — MELATONIN TAB 3 MG 6 MG: 3 TAB at 08:09

## 2020-09-02 RX ADMIN — CEFTRIAXONE SODIUM 1 G: 1 INJECTION, POWDER, FOR SOLUTION INTRAMUSCULAR; INTRAVENOUS at 12:09

## 2020-09-02 RX ADMIN — IPRATROPIUM BROMIDE AND ALBUTEROL SULFATE 3 ML: .5; 2.5 SOLUTION RESPIRATORY (INHALATION) at 03:09

## 2020-09-02 RX ADMIN — GUAIFENESIN AND DEXTROMETHORPHAN HYDROBROMIDE 1 TABLET: 600; 30 TABLET, EXTENDED RELEASE ORAL at 09:09

## 2020-09-02 RX ADMIN — HYDRALAZINE HYDROCHLORIDE 25 MG: 25 TABLET ORAL at 12:09

## 2020-09-02 RX ADMIN — PROMETHAZINE HYDROCHLORIDE AND CODEINE PHOSPHATE 5 ML: 10; 6.25 SOLUTION ORAL at 09:09

## 2020-09-02 RX ADMIN — PROMETHAZINE HYDROCHLORIDE AND CODEINE PHOSPHATE 5 ML: 10; 6.25 SOLUTION ORAL at 12:09

## 2020-09-02 RX ADMIN — PROMETHAZINE HYDROCHLORIDE AND CODEINE PHOSPHATE 5 ML: 10; 6.25 SOLUTION ORAL at 08:09

## 2020-09-02 RX ADMIN — FLUTICASONE FUROATE AND VILANTEROL TRIFENATATE 1 PUFF: 100; 25 POWDER RESPIRATORY (INHALATION) at 08:09

## 2020-09-02 RX ADMIN — LEVETIRACETAM 500 MG: 500 TABLET ORAL at 08:09

## 2020-09-02 RX ADMIN — ATORVASTATIN CALCIUM 40 MG: 20 TABLET, FILM COATED ORAL at 09:09

## 2020-09-02 RX ADMIN — CYANOCOBALAMIN TAB 250 MCG 500 MCG: 250 TAB at 09:09

## 2020-09-02 RX ADMIN — METOPROLOL TARTRATE 25 MG: 25 TABLET, FILM COATED ORAL at 08:09

## 2020-09-02 RX ADMIN — AMLODIPINE BESYLATE 10 MG: 10 TABLET ORAL at 09:09

## 2020-09-02 RX ADMIN — HYDRALAZINE HYDROCHLORIDE 25 MG: 25 TABLET ORAL at 08:09

## 2020-09-02 RX ADMIN — HYDRALAZINE HYDROCHLORIDE 25 MG: 25 TABLET ORAL at 04:09

## 2020-09-02 RX ADMIN — GUAIFENESIN AND DEXTROMETHORPHAN HYDROBROMIDE 1 TABLET: 600; 30 TABLET, EXTENDED RELEASE ORAL at 08:09

## 2020-09-02 NOTE — PT/OT/SLP PROGRESS
Occupational Therapy      Patient Name:  Melvi Conrad   MRN:  [id number]    Patient not seen today secondary to POC. Per chart review, pt remains appropriate for continued therapy while hospitalized. Will follow up.    Rashmi Calixto OT  9/2/2020

## 2020-09-02 NOTE — PLAN OF CARE
DX:Covid with acute hypoxic resp failure     Shift Events:v/s q 8hrs and monitor spo02 pt doing much much better in regard to activity      Plan of Care:monitor covid s/s and resolve hypoxia, abt therapy     Neuro: A/O x4     Respiratory:2-3 liters     Cardiac:N/S     Diet:cardiac      Gtt: IV push ceftriaxone     Skin:bruising

## 2020-09-02 NOTE — PLAN OF CARE
Spoke with patient CG Vince Conrad and discussed dc plan. Vince states that [facility] is still patient primary choice post dc. His Father is having a meeting with CDND tomorrow to discuss best plan for patient. Gave him my phone number and asked him to call me after meeting with update. Dr. Schrader aware of above.

## 2020-09-02 NOTE — PLAN OF CARE
Pt OOB to chair eating evening meal. O2 sat 95% on 3L via NC. Denies resp distress or pain at this note. Call light in reach able to make needs known.

## 2020-09-02 NOTE — PROGRESS NOTES
Hospital Medicine  Progress Note  [facility]      Patient Name: Melvi Conrad  MRN:  [id number]  Hospital Medicine Team: [facility] HOS[facility] MED G Surinder Schrader MD  Date of Admission:  8/28/2020     Length of Stay:  LOS: 5 days       Principal Problem:  Acute hypoxemic respiratory failure      HPI:  73 yo F with PMHx HTN, CKD3, COPD on 2L NC, epilepsy, and CAD presents to the hospital from [facility] with worsening SOB 1 week after being discharged from [facility] for COVID-19 pneumonia. The patient reports that over the last 2 days at the [facility], she has developed a progressive decline in her breathing. She reports that she was unable to participate much with therapy today and yesterday 2/2 SOB. She reports that she had been unable to receive her nebulizer treatments while in the SNF due to concerns of aerosolization of the virus, and she believes this significantly affected her recovery. She denies any chest pain, lightheadedness, palpitations, fevers, chills, or new cough.       Per son, the pateint was admitted to [facility] for symptoms of difficulty breathing, coughing, and fatigue. She tested positive for COVID-19 on admission and was admitted from 8/5- 8/21. Pt. And son reports that she spent the majority of her stay in the ICU, but she was never intubated. She received remdesivir, steroids, and convalescent plasma per son. Pt. Was also reportedly started on xarelto, but the reasoning is unclear. The son and pt. Deny that she ever had a blood clot. They states that she was put on the medication to prevent blood clots in the setting of COVID-19 infection. She was improving at time of discharged, but did require 2L NC at time of discharge which she had been on at [facility] until today.      Hospital Course:  Management of acute respiratory failure with hypoxia due to COVID-19 infection.    Interval History:     Patient sitting in chair, mild respiratory distress. Reports SOB  improving and close to baseline. Reports working well with therapy without significant dyspnea on exertion. Pleased that her Hb remained stable after transfusion. Reports regular bowel movement and tolerating diet.     Review of Systems:  Constitutional: no fever or chills  Respiratory: Positive for cough and SOB  Cardiovascular: no chest pain or palpitations  Gastrointestinal: no nausea or vomiting, no abdominal pain or change in bowel habits  Genitourinary: no hematuria or dysuria  Integument/Breast: no rash or pruritis  Hematologic/Lymphatic: no easy bruising or lymphadenopathy  Musculoskeletal: no arthralgias or myalgias  Neurological: no seizures or tremors  Behavioral/Psych: no depression or anxiety    Inpatient Medications:    Current Facility-Administered Medications:     0.9%  NaCl infusion (for blood administration), , Intravenous, Q24H PRN, Surinder Schrader MD    acetaminophen tablet 650 mg, 650 mg, Oral, Q4H PRN, Lan Cerrato MD    albuterol inhaler 2 puff, 2 puff, Inhalation, Q6H PRN, Lan Cerrato MD, 2 puff at 08/31/20 1810    albuterol-ipratropium 2.5 mg-0.5 mg/3 mL nebulizer solution 3 mL, 3 mL, Nebulization, Q6H WAKE, Lan Cerrato MD, 3 mL at 09/02/20 0822    amLODIPine tablet 10 mg, 10 mg, Oral, Daily, Lan Cerrato MD, 10 mg at 09/01/20 0857    atorvastatin tablet 40 mg, 40 mg, Oral, Daily, Lan Cerrato MD, 40 mg at 09/01/20 0859    benzonatate capsule 100 mg, 100 mg, Oral, TID PRN, Surinder Schrader MD, 100 mg at 08/29/20 1616    cefTRIAXone injection 1 g, 1 g, Intravenous, Q24H, Lan Cerrato MD, 1 g at 09/02/20 0023    cyanocobalamin tablet 500 mcg, 500 mcg, Oral, Daily, Lan Cerrato MD, 500 mcg at 09/01/20 0900    dexAMETHasone tablet 6 mg, 6 mg, Oral, Daily, Surnider Schrader MD, 6 mg at 09/01/20 0857    dextromethorphan-guaifenesin  mg per 12 hr tablet 1 tablet, 1 tablet, Oral, BID, Surinder Schrader MD, 1 tablet at 09/01/20 2030    fluticasone  "furoate-vilanteroL 100-25 mcg/dose diskus inhaler 1 puff, 1 puff, Inhalation, Daily, Surinder Schrader MD, 1 puff at 09/02/20 0822    glucose chewable tablet 16 g, 16 g, Oral, PRN, Lan Cerrato MD    glucose chewable tablet 24 g, 24 g, Oral, PRN, Lan Cerrato MD    hydrALAZINE tablet 25 mg, 25 mg, Oral, QID, Lan Cerrato MD, 25 mg at 09/01/20 2030    levETIRAcetam tablet 500 mg, 500 mg, Oral, BID, Lan Cerrato MD, 500 mg at 09/01/20 2030    melatonin tablet 6 mg, 6 mg, Oral, Nightly PRN, Lan Cerrato MD, 6 mg at 08/29/20 0105    metoprolol tartrate (LOPRESSOR) tablet 25 mg, 25 mg, Oral, BID, Lan Cerrato MD, 25 mg at 09/01/20 2030    ondansetron injection 4 mg, 4 mg, Intravenous, Q8H PRN, Lan Cerrato MD    promethazine (PHENERGAN) 6.25 mg in dextrose 5 % 50 mL IVPB, 6.25 mg, Intravenous, Q6H PRN, Lan Cerrato MD    promethazine-codeine 6.25-10 mg/5 ml syrup 5 mL, 5 mL, Oral, Q4H PRN, Surinder Schrader MD, 5 mL at 09/02/20 0039    sodium chloride 0.9% flush 10 mL, 10 mL, Intravenous, PRN, Koki Mehta MD      Physical Exam:      Intake/Output Summary (Last 24 hours) at 9/2/2020 0928  Last data filed at 9/1/2020 1200  Gross per 24 hour   Intake 0 ml   Output --   Net 0 ml     Wt Readings from Last 3 Encounters:   08/30/20 81.2 kg (179 lb)   08/28/19 77.1 kg (170 lb)   07/28/14 66.2 kg (146 lb)       BP (!) 144/65 (BP Location: Left leg, Patient Position: Sitting)   Pulse 73   Temp 98.4 °F (36.9 °C) (Oral)   Resp (!) 22   Ht 4' 11" (1.499 m)   Wt 81.2 kg (179 lb)   SpO2 (!) 93%   Breastfeeding No   BMI 36.15 kg/m²     GEN: mild respiratory distress, conversant  Resp: Decreased breath sounds B/L, no wheezes or rales, normal work of breathing   CV: RRR, II/VI systolic ejection murmur, no edema  GI: soft, NTND  Skin: no rash  Neuro: CN grossly intact, no focal neurologic deficits    Laboratory:  Lab Results   Component Value Date    [id number] Positive (A) 09/01/2020 "       Recent Labs   Lab 08/29/20  0343 08/31/20  0306 09/01/20  0423   WBC 7.05 9.31 11.26   LYMPH 8.5*  0.6* 14.4*  1.3 12.3*  1.4   HGB 7.3* 6.6* 9.3*   HCT 24.2* 23.0* 28.8*    230 258     Recent Labs   Lab 08/29/20  0343 08/30/20  0855 09/01/20  0423    139 137   K 4.4 4.6 5.0    102 101   CO2 25 27 27   BUN 12 19 24*   CREATININE 0.8 1.0 0.8   * 122* 132*   CALCIUM 8.9 8.9 8.8     Recent Labs   Lab 08/28/20  1939 08/29/20  0343 08/30/20  0855   ALKPHOS 70 76 63   ALT 20 21 20   AST 21 27 28   ALBUMIN 2.6* 2.7* 2.7*   PROT 6.8 7.3 7.3   BILITOT 0.7 0.5 0.4        Recent Labs     09/01/20  0423   FERRITIN 287   CRP 34.7*       All labs within the last 24 hours were reviewed.     Microbiology:  Microbiology Results (last 7 days)     ** No results found for the last 168 hours. **            Imaging  ECG Results          Repeat EKG 12-lead (Final result)  Result time 08/29/20 22:21:04    Final result by Interface, Lab In [facility] (08/29/20 22:21:04)                 Narrative:    Test Reason : R79.89,    Vent. Rate : 073 BPM     Atrial Rate : 073 BPM     P-R Int : 152 ms          QRS Dur : 072 ms      QT Int : 402 ms       P-R-T Axes : 105 060 144 degrees     QTc Int : 442 ms    Baseline Artifact  Normal sinus rhythm  Nonspecific ST and/or T wave abnormalities  Abnormal ECG  When compared with ECG of 28-AUG-2020 19:07,  No significant change was found  Confirmed by Ruddy Ruby MD ([id number]) on 8/29/2020 10:20:54 PM    Referred By: System System           Confirmed By:Ruddy Ruby MD                             EKG 12-lead (Final result)  Result time 08/29/20 22:08:57    Final result by Interface, Lab In [facility] (08/29/20 22:08:57)                 Narrative:    Test Reason : R68.89,    Vent. Rate : 068 BPM     Atrial Rate : 068 BPM     P-R Int : 152 ms          QRS Dur : 078 ms      QT Int : 414 ms       P-R-T Axes : 002 051 142 degrees     QTc Int : 440 ms    Baseline Artifact  Baseline  wander  Normal sinus rhythm  ST-t wave changes of strain or ischemia  Abnormal ECG  When compared with ECG of 29-JUL-2014 06:14,  No significant change was found  Confirmed by Ruddy Ruby MD ([id number]) on 8/29/2020 10:08:46 PM    Referred By: System System           Confirmed By:Ruddy Ruby MD                              No results found for this or any previous visit.    Echo Color Flow Doppler? Yes  · Normal left ventricular systolic function. The estimated ejection   fraction is 65%.  · Grade II (moderate) left ventricular diastolic dysfunction consistent   with pseudonormalization.  · No wall motion abnormalities.  · Normal right ventricular systolic function.  · Mild left atrial enlargement.  · Aortic valve area is 1.02 cm2; peak velocity is 3.55 m/s; mean gradient   is 27 mmHg.  · Moderate aortic valve stenosis.  · Mild mitral regurgitation.  · Mild tricuspid regurgitation.  · The estimated PA systolic pressure is 42 mmHg.  · Pulmonary hypertension present.  · Intermediate central venous pressure (8 mmHg).  · There is a right pleural effusion.         All imaging within the last 24 hours was reviewed.     Assessment and Plan:    Active Hospital Problems    Diagnosis  POA    *Acute hypoxemic respiratory failure [J96.01]  Yes    COVID-19 [U07.1]  Yes    COPD exacerbation [J44.1]  Yes    Anemia [D64.9]  Yes      Resolved Hospital Problems   No resolved problems to display.       Scheduled Meds:   albuterol-ipratropium  3 mL Nebulization Q6H WAKE    amLODIPine  10 mg Oral Daily    atorvastatin  40 mg Oral Daily    cefTRIAXone (ROCEPHIN) IVPB  1 g Intravenous Q24H    cyanocobalamin  500 mcg Oral Daily    dexAMETHasone  6 mg Oral Daily    dextromethorphan-guaifenesin  mg  1 tablet Oral BID    fluticasone furoate-vilanteroL  1 puff Inhalation Daily    hydrALAZINE  25 mg Oral QID    levETIRAcetam  500 mg Oral BID    metoprolol tartrate  25 mg Oral BID     Continuous Infusions:  PRN Meds:.sodium  chloride, acetaminophen, albuterol, benzonatate, glucose, glucose, melatonin, ondansetron, promethazine (PHENERGAN) IVPB, promethazine-codeine 6.25-10 mg/5 ml, sodium chloride 0.9%      COVID-19 Virus Infection  Viral Pneumonia due to COVID-19    - COVID-19 testing: positive on 8/5/20 at [facility]  - Isolation: Airborne/Droplet. Surgical mask on patient. Notify Infection Control  - Diagnostics: Trend Q48hrs if stable, more frequently if patient decompensating     Laboratory/Study Frequency Result   CBC Admit & Q48h    CMP Admit & Q48h    D-dimer Admit & Q48h    Ferritin Admit & Q48h    CRP Admit & Q48h    CPK Admit & Q24h if elevated    LDH Admit & Q48h    Vitamin D Admit    BNP Admit    Troponin Admit & Q6h if elevated    Procalcitonin Admit      Lymphopenia, hyponatremia, hyperferritinemia, elevated troponin, elevated d-dimer, age, and medical comorbidities are significant predictors of poor clinical outcome    - Management: per [facility] COVID Treatment Protocol (4/15/20)    - Monitoring:   - Telemetry & Continuous Pulse Oximetry if > 3L via NC    - Nutrition:    - Multivitamin PO daily   - Boost supplement   - Vitamin D 1000IU daily if deficient   - Ascorbic acid 500mg PO bid    - Supportive Care:   - acetaminophen 650mg PO Q6hr PRN fever/headache   - loperamide PRN viral diarrhea   - IVF if indicated, restrictive strategy preferred, no maintenance IV if able   - VTE PPx: enoxaparin or heparin SQ unless contraindicated    - Antibiotics:   - ceftriaxone 1g Q24h x 5 days  - azithromycin 500mg IV/PO x3    - Investigational Therapies:  - atorvastatin 40mg po daily  - per patient's son, received patient received remdesivir, steroids, and convalescent plasma at [facility]    Acute Hypoxemic Respiratory Failure  - on 4L NC. Wean O2 as tolerated  - Order RT consult via Respiratory Communication for COVID Protocols  - Order Incentive Spirometer Q4h  - Order Flutter Valve Q4h  - Continuous Pulse Oximetry  - Goal SpO2 92-96%  -  Supplemental O2 via LFNC, VentiMask, or HFNC (see Respiratory Support Oxygen Therapies)  - If wheezing, albuterol INH Q6h scheduled & PRN  - Proning Protocol if patient is a candidate (see  Proning Protocol)   - GCS >13, able to self-prone  - If deterioration, may warrant trial of NIPPV and transfer to [facility] pressure room or immediate ICU consult  - continue dexamethasone  - codeine syrup prn for cough     COPD exacerbation- improving   - getachew  - Breo   - on dexamethasone   - wean O2 as tolerated     Anemia  - Hgb 7.0 on presentation, baseline unclear. Pt. Denies any melena or hematochezia  - Pt. Recently started On xarelto per SNF paperwork, but no diagnosis of blood clot listed and pt. Denies ever having one  - CTA ordered to rule out PE  - will hold xarelto in meantime given the anemia  - Continue home B12 supplementation  - Monitor CBC and transfuse to goal >7.   - transfused one unit pRBC on 8/31 for Hb 6.6.   - Hb 9.3--> 8.9  - [facility] reports that Hb must be > 9 for admission     Anticoagulation use  - on Xarelto at home  - family report that patient was prescribed Xarelto as ppx in the setting of COVID infection   - discontinued Xarelto  - US upper and lower extremities negative for DVT  - no h/o afib   - followup with PCP to discuss resuming Xarelto     HTN  - Continue home meds norvasc, hydralazine, and lopressor     Hx of CAD  - Per SNF paperwork, full history unclear  - Pt. Denies any active chest pain, no ST changes compared to prior EKG  -Troponin mildly elevated at 0.092, but this appears to be chronic and is not surprising in setting of demand from hypoxia.   - Low-suspicion for ACS, but will continue to trend troponin.   - TTE ordered given the patient's reported cardiac history and acitve hypoxia without a baseline/recent TTE in system  -Continue statin        Advance Care Planning  Goals of care, counseling/discussion: Full code   Advance Care Planning     If patient transitions to Comfort-Focused  "Care, please place "Nurse Communication: End of Life Care, family members allowed to visit, including spouse/partner and adult children [please list names]. Please ask family to visit as a group and leave as a group.       VTE High Risk Prophylaxis:   VTE Risk Mitigation (From admission, onward)         Ordered     IP VTE HIGH RISK PATIENT  Once      08/28/20 2223     Place sequential compression device  Until discontinued      08/28/20 2223                  Subsequent Inpatient Hospital Care        Level 2 32558 Total visit time was 25 minutes or greater with greater than 50% of time spent in counseling and coordination of care.   .           Surinder Schrader MD  Hospital Medicine Staff  Pager: [phone]; Spectra: [phone]          "

## 2020-09-03 LAB
ANION GAP SERPL CALC-SCNC: 10 MMOL/L (ref 8–16)
ANION GAP SERPL CALC-SCNC: 13 MMOL/L (ref 8–16)
BASOPHILS # BLD AUTO: 0.03 K/UL (ref 0–0.2)
BASOPHILS NFR BLD: 0.3 % (ref 0–1.9)
BUN SERPL-MCNC: 29 MG/DL (ref 8–23)
BUN SERPL-MCNC: 30 MG/DL (ref 8–23)
CALCIUM SERPL-MCNC: 8.8 MG/DL (ref 8.7–10.5)
CALCIUM SERPL-MCNC: 9.2 MG/DL (ref 8.7–10.5)
CHLORIDE SERPL-SCNC: 102 MMOL/L (ref 95–110)
CHLORIDE SERPL-SCNC: 99 MMOL/L (ref 95–110)
CO2 SERPL-SCNC: 23 MMOL/L (ref 23–29)
CO2 SERPL-SCNC: 27 MMOL/L (ref 23–29)
CREAT SERPL-MCNC: 0.9 MG/DL (ref 0.5–1.4)
CREAT SERPL-MCNC: 0.9 MG/DL (ref 0.5–1.4)
CRP SERPL-MCNC: 13.6 MG/L (ref 0–8.2)
DIFFERENTIAL METHOD: ABNORMAL
EOSINOPHIL # BLD AUTO: 0 K/UL (ref 0–0.5)
EOSINOPHIL NFR BLD: 0.1 % (ref 0–8)
ERYTHROCYTE [DISTWIDTH] IN BLOOD BY AUTOMATED COUNT: 20.3 % (ref 11.5–14.5)
EST. GFR  (AFRICAN AMERICAN): >60 ML/MIN/1.73 M^2
EST. GFR  (AFRICAN AMERICAN): >60 ML/MIN/1.73 M^2
EST. GFR  (NON AFRICAN AMERICAN): >60 ML/MIN/1.73 M^2
EST. GFR  (NON AFRICAN AMERICAN): >60 ML/MIN/1.73 M^2
FERRITIN SERPL-MCNC: 269 NG/ML (ref 20–300)
GLUCOSE SERPL-MCNC: 106 MG/DL (ref 70–110)
GLUCOSE SERPL-MCNC: 122 MG/DL (ref 70–110)
HCT VFR BLD AUTO: 27.7 % (ref 37–48.5)
HGB BLD-MCNC: 8.7 G/DL (ref 12–16)
IMM GRANULOCYTES # BLD AUTO: 0.43 K/UL (ref 0–0.04)
IMM GRANULOCYTES NFR BLD AUTO: 4.7 % (ref 0–0.5)
LYMPHOCYTES # BLD AUTO: 1.2 K/UL (ref 1–4.8)
LYMPHOCYTES NFR BLD: 12.9 % (ref 18–48)
MCH RBC QN AUTO: 32.8 PG (ref 27–31)
MCHC RBC AUTO-ENTMCNC: 31.4 G/DL (ref 32–36)
MCV RBC AUTO: 105 FL (ref 82–98)
MONOCYTES # BLD AUTO: 0.8 K/UL (ref 0.3–1)
MONOCYTES NFR BLD: 9 % (ref 4–15)
NEUTROPHILS # BLD AUTO: 6.6 K/UL (ref 1.8–7.7)
NEUTROPHILS NFR BLD: 73 % (ref 38–73)
NRBC BLD-RTO: 2 /100 WBC
PLATELET # BLD AUTO: 228 K/UL (ref 150–350)
PMV BLD AUTO: 10.5 FL (ref 9.2–12.9)
POTASSIUM SERPL-SCNC: 4.4 MMOL/L (ref 3.5–5.1)
POTASSIUM SERPL-SCNC: 5.7 MMOL/L (ref 3.5–5.1)
RBC # BLD AUTO: 2.65 M/UL (ref 4–5.4)
SODIUM SERPL-SCNC: 136 MMOL/L (ref 136–145)
SODIUM SERPL-SCNC: 138 MMOL/L (ref 136–145)
WBC # BLD AUTO: 9.08 K/UL (ref 3.9–12.7)

## 2020-09-03 PROCEDURE — 94640 AIRWAY INHALATION TREATMENT: CPT

## 2020-09-03 PROCEDURE — 94761 N-INVAS EAR/PLS OXIMETRY MLT: CPT

## 2020-09-03 PROCEDURE — 85025 COMPLETE CBC W/AUTO DIFF WBC: CPT

## 2020-09-03 PROCEDURE — 93010 ELECTROCARDIOGRAM REPORT: CPT | Mod: ,,, | Performed by: INTERNAL MEDICINE

## 2020-09-03 PROCEDURE — 82728 ASSAY OF FERRITIN: CPT

## 2020-09-03 PROCEDURE — 25000003 PHARM REV CODE 250: Performed by: INTERNAL MEDICINE

## 2020-09-03 PROCEDURE — 36415 COLL VENOUS BLD VENIPUNCTURE: CPT

## 2020-09-03 PROCEDURE — 25000003 PHARM REV CODE 250: Performed by: HOSPITALIST

## 2020-09-03 PROCEDURE — 86140 C-REACTIVE PROTEIN: CPT

## 2020-09-03 PROCEDURE — 80048 BASIC METABOLIC PNL TOTAL CA: CPT

## 2020-09-03 PROCEDURE — 93005 ELECTROCARDIOGRAM TRACING: CPT

## 2020-09-03 PROCEDURE — 25000242 PHARM REV CODE 250 ALT 637 W/ HCPCS: Performed by: HOSPITALIST

## 2020-09-03 PROCEDURE — 94799 UNLISTED PULMONARY SVC/PX: CPT

## 2020-09-03 PROCEDURE — 80048 BASIC METABOLIC PNL TOTAL CA: CPT | Mod: 91

## 2020-09-03 PROCEDURE — 27000221 HC OXYGEN, UP TO 24 HOURS

## 2020-09-03 PROCEDURE — 93010 RHYTHM STRIP: ICD-10-PCS | Mod: ,,, | Performed by: INTERNAL MEDICINE

## 2020-09-03 PROCEDURE — 97535 SELF CARE MNGMENT TRAINING: CPT

## 2020-09-03 PROCEDURE — 11000001 HC ACUTE MED/SURG PRIVATE ROOM

## 2020-09-03 PROCEDURE — 97116 GAIT TRAINING THERAPY: CPT

## 2020-09-03 RX ORDER — ALBUTEROL SULFATE 2.5 MG/.5ML
10 SOLUTION RESPIRATORY (INHALATION) ONCE
Status: COMPLETED | OUTPATIENT
Start: 2020-09-03 | End: 2020-09-03

## 2020-09-03 RX ORDER — TIOTROPIUM BROMIDE 18 UG/1
1 CAPSULE ORAL; RESPIRATORY (INHALATION) DAILY
Status: DISCONTINUED | OUTPATIENT
Start: 2020-09-04 | End: 2020-09-09 | Stop reason: HOSPADM

## 2020-09-03 RX ADMIN — METOPROLOL TARTRATE 25 MG: 25 TABLET, FILM COATED ORAL at 08:09

## 2020-09-03 RX ADMIN — LEVETIRACETAM 500 MG: 500 TABLET ORAL at 09:09

## 2020-09-03 RX ADMIN — ALBUTEROL SULFATE 10 MG: 2.5 SOLUTION RESPIRATORY (INHALATION) at 12:09

## 2020-09-03 RX ADMIN — FLUTICASONE FUROATE AND VILANTEROL TRIFENATATE 1 PUFF: 100; 25 POWDER RESPIRATORY (INHALATION) at 08:09

## 2020-09-03 RX ADMIN — GUAIFENESIN AND DEXTROMETHORPHAN HYDROBROMIDE 1 TABLET: 600; 30 TABLET, EXTENDED RELEASE ORAL at 08:09

## 2020-09-03 RX ADMIN — CYANOCOBALAMIN TAB 250 MCG 500 MCG: 250 TAB at 08:09

## 2020-09-03 RX ADMIN — ATORVASTATIN CALCIUM 40 MG: 20 TABLET, FILM COATED ORAL at 08:09

## 2020-09-03 RX ADMIN — GUAIFENESIN AND DEXTROMETHORPHAN HYDROBROMIDE 1 TABLET: 600; 30 TABLET, EXTENDED RELEASE ORAL at 09:09

## 2020-09-03 RX ADMIN — PROMETHAZINE HYDROCHLORIDE AND CODEINE PHOSPHATE 5 ML: 10; 6.25 SOLUTION ORAL at 08:09

## 2020-09-03 RX ADMIN — METOPROLOL TARTRATE 25 MG: 25 TABLET, FILM COATED ORAL at 09:09

## 2020-09-03 RX ADMIN — HYDRALAZINE HYDROCHLORIDE 25 MG: 25 TABLET ORAL at 09:09

## 2020-09-03 RX ADMIN — HYDRALAZINE HYDROCHLORIDE 25 MG: 25 TABLET ORAL at 12:09

## 2020-09-03 RX ADMIN — IPRATROPIUM BROMIDE AND ALBUTEROL SULFATE 3 ML: .5; 2.5 SOLUTION RESPIRATORY (INHALATION) at 08:09

## 2020-09-03 RX ADMIN — SODIUM ZIRCONIUM CYCLOSILICATE 10 G: 10 POWDER, FOR SUSPENSION ORAL at 12:09

## 2020-09-03 RX ADMIN — HYDRALAZINE HYDROCHLORIDE 25 MG: 25 TABLET ORAL at 04:09

## 2020-09-03 RX ADMIN — LEVETIRACETAM 500 MG: 500 TABLET ORAL at 08:09

## 2020-09-03 RX ADMIN — PROMETHAZINE HYDROCHLORIDE AND CODEINE PHOSPHATE 5 ML: 10; 6.25 SOLUTION ORAL at 04:09

## 2020-09-03 RX ADMIN — HYDRALAZINE HYDROCHLORIDE 25 MG: 25 TABLET ORAL at 08:09

## 2020-09-03 RX ADMIN — AMLODIPINE BESYLATE 10 MG: 10 TABLET ORAL at 08:09

## 2020-09-03 NOTE — PLAN OF CARE
Problem: Occupational Therapy Goal  Goal: Occupational Therapy Goal  Description: Goals to be met by: 9/29/2020     Patient will increase functional independence with ADLs by performing:    UE Dressing with [place].  LE Dressing with [place].  Grooming while standing with Stand-by Assistance.  Toileting from toilet with Stand-by Assistance for hygiene and clothing management.   Rolling to Bilateral with [place].   Supine to sit with [place].  Step transfer with Stand-by Assistance  Toilet transfer to toilet with Stand-by Assistance.  Pt to demonstrate pursed lip breathing for ADL tasks to reflect Sp02 WNL.  Pt to complete ADL tasks with <1 rest break for cardiovascular needs.  Pt making excellent progress towards ADL task completion with rest breaks for 02 needs. Continue OT as tolerated-goals remain appropriate.  Rashmi Calixto OT  9/3/2020    Outcome: Ongoing, Progressing

## 2020-09-03 NOTE — PLAN OF CARE
Pt would benefit from continued skilled acute PT services to improve functional mobility. POC is appropriate and pt should continue towards current goals set to progress to PLOF.     Problem: Physical Therapy Goal  Goal: Physical Therapy Goal  Description: Goals to be met by: 2020    Patient will increase functional independence with mobility by performin. Supine to sit with Modified [place]  2. Sit to supine with Modified [place]  3. Sit to stand transfer with Modified [place]  4. Gait  x 20 feet with Stand-by Assistance using LRAD -Met 9/3/2020   5. Lower extremity exercise program x15 reps, with independence     Outcome: Ongoing, Progressing

## 2020-09-03 NOTE — PT/OT/SLP PROGRESS
Occupational Therapy   Treatment    Name: Melvi Conrad  MRN: [id number]  Admitting Diagnosis:  Acute hypoxemic respiratory failure       Recommendations:     Discharge Recommendations: nursing facility, skilled  Discharge Equipment Recommendations:  other (see comments)(tbd pending progress)  Barriers to discharge:  Inaccessible home environment    Assessment:     Melvi Conrad is a 74 y.o. female with a medical diagnosis of Acute hypoxemic respiratory failure. Pt presents with decreased endurance and impaired mobility performance as limited by cardiovascular status and generalized weakness. Pt found UIC and very pleasant throughout OT/PT session. Session focus included bedroom mobility, standing ADL tasks at sink, and safe repositioning in chair for clearing lungs/back support. Pt on 4L 02 with minor desaturations during conversation holding and while standing at sink (upper 80s-90s Sp02%). Pt required SBA-CGA for mobility in room using RW. Pt remains a high fall risk and is eager to continue therapy to return to PLOF.  Performance deficits affecting function are weakness, impaired self care skills, impaired endurance, impaired functional mobilty, gait instability, decreased lower extremity function, impaired cardiopulmonary response to activity. Pt would benefit from continued OT skilled services 4x/wk to improve daily living skills to optimize QOL.  Pt is recommended to discharge to SNF at this time.        Rehab Prognosis:  Good; patient would benefit from acute skilled OT services to address these deficits and reach maximum level of function.       Plan:     Patient to be seen 4 x/week to address the above listed problems via self-care/home management, therapeutic activities, therapeutic exercises  · Plan of Care Expires: 09/29/20  · Plan of Care Reviewed with: patient    Subjective     Pain/Comfort:  · Pain Rating 1: 0/10  · Pain Rating 2: 0/10    Objective:     Communicated with: RN prior to session.  Patient  found up in chair with blood pressure cuff, telemetry, oxygen, pulse ox (continuous) upon OT entry to room.    General Precautions: Standard, airborne, droplet, fall, contact   Orthopedic Precautions:N/A   Braces: N/A     Occupational Performance:     Bed Mobility:    · NT as pt found UIC     Functional Mobility/Transfers:  · Patient completed Sit <> Stand Transfer with contact guard assistance  with  rolling walker   · Patient completed Bed <> Chair Transfer using Step Transfer technique with stand by assistance with rolling walker  · Functional Mobility: Pt completed ~1 full lap in bedroom using RW with CGA. Pt stood at sink ~3 min for grooming tasks with rest break required for 02 needs. Pt desat ~80-90s following longer distance ambulation and while holding conversation during ADL completion. Pt on 4L throughout.     Activities of Daily Living:  · Feeding:  setup (A) with fresh drinking water while UIC   · Grooming: stand by assistance brushing teeth and washing face standing at sink (~3 min)   · Upper Body Dressing: minimum assistance adjusting gown fit in standing    · Lower body dressing: setup A while donning  socks using figure 4 stretch up in chair     [facility] 6 Click ADL: 20    Treatment & Education:  Pt educated on role of occupational therapy, POC, and safety during ADLs and functional mobility. Pt and OT discussed importance of safe, continued mobility to optimize daily living skills. Pt verbalized understanding.   Pt completed the following during session: UB/LB dressing, sit<stand t/f, bedroom mobility, standing ADL tasks, pursed lip breathing education, and safe repositioning for comfort while UIC. White board updated during session. Pt given instruction to call for medical staff/nurse for assistance.       Patient left up in chair with all lines intact, call button in reach and RN Ted notifiedEducation:      GOALS:   Multidisciplinary Problems     Occupational Therapy Goals        Problem:  Occupational Therapy Goal    Goal Priority Disciplines Outcome Interventions   Occupational Therapy Goal     OT, PT/OT Ongoing, Progressing    Description: Goals to be met by: 9/29/2020     Patient will increase functional independence with ADLs by performing:    UE Dressing with [place].  LE Dressing with [place].  Grooming while standing with Stand-by Assistance.  Toileting from toilet with Stand-by Assistance for hygiene and clothing management.   Rolling to Bilateral with [place].   Supine to sit with [place].  Step transfer with Stand-by Assistance  Toilet transfer to toilet with Stand-by Assistance.  Pt to demonstrate pursed lip breathing for ADL tasks to reflect Sp02 WNL.  Pt to complete ADL tasks with <1 rest break for cardiovascular needs.                   Time Tracking:     OT Date of Treatment: 09/03/20  OT Start Time: 1034  OT Stop Time: 1100  OT Total Time (min): 26 min    Billable Minutes:Self Care/Home Management 26 min    Rashmi Calixto OT  9/3/2020

## 2020-09-03 NOTE — PLAN OF CARE
Problem: Fall Injury Risk  Goal: Absence of Fall and Fall-Related Injury  Intervention: Identify and Manage Contributors to Fall Injury Risk  Flowsheets (Taken 9/2/2020 2318)  Self-Care Promotion: independence encouraged  Medication Review/Management: medications reviewed  Intervention: Promote Injury-Free Environment  Flowsheets (Taken 9/2/2020 2318)  Safety Promotion/Fall Prevention: assistive device/personal item within reach     Problem: Adult Inpatient Plan of Care  Goal: Absence of Hospital-Acquired Illness or Injury  Intervention: Identify and Manage Fall Risk  Flowsheets (Taken 9/2/2020 2318)  Safety Promotion/Fall Prevention: assistive device/personal item within reach  Intervention: Prevent VTE (venous thromboembolism)  Flowsheets (Taken 9/2/2020 2318)  VTE Prevention/Management: remove, assess skin and reapply sequential compression device  Goal: Optimal Comfort and Wellbeing  Intervention: Provide Person-Centered Care  Flowsheets (Taken 9/2/2020 2318)  Trust Relationship/Rapport: care explained     Pt in bed, easily aroused via verbal stimuli.  No acute distress noted.  Afebrile.  No SOB noted at this time.  Pt becomes SOB with exertion/while talking.  Currently on 2L via NC with SpO2 ranging in the low 90s.  Assisted pt with bedtime care.  Bed to lowest limit, call light in hand, No complaints voiced at this time.

## 2020-09-03 NOTE — PLAN OF CARE
PT IS NOT MEDICALLY READY FOR DISCHARGE    09/03/20 3126   Discharge Reassessment   Assessment Type Discharge Planning Reassessment   Provided patient/caregiver education on the expected discharge date and the discharge plan No   Do you have any problems affording any of your prescribed medications? Yes

## 2020-09-03 NOTE — PT/OT/SLP PROGRESS
Physical Therapy Treatment    Patient Name:  Melvi Conrad   MRN:  [id number]    Recommendations:     Discharge Recommendations:  nursing facility, skilled   Discharge Equipment Recommendations: (TBD)   Barriers to discharge: decreased functional mobility requiring increased assist      Assessment:     Melvi Conrad is a 74 y.o. female admitted with a medical diagnosis of Acute hypoxemic respiratory failure.  She presents with the following impairments/functional limitations:  weakness, impaired endurance, impaired self care skills, impaired functional mobilty, decreased lower extremity function, gait instability, impaired balance, impaired cardiopulmonary response to activity. Pt treated on this date tolerating session well as pt met gait goal and voiced no SOB following. Pt performed all activity on 4L O2. Pt would benefit from continued skilled acute PT 3x/wk to improve functional mobility.  Recommending pt receive PT services in SNF setting following discharge from hospital once medically cleared.     Rehab Prognosis: Good; patient would benefit from acute skilled PT services to address these deficits and reach maximum level of function.    Recent Surgery: * No surgery found *      Plan:     During this hospitalization, patient to be seen 3 x/week to address the identified rehab impairments via gait training, therapeutic activities, therapeutic exercises, neuromuscular re-education and progress toward the following goals:    · Plan of Care Expires:  09/28/20    Subjective     Chief Complaint: none noted   Patient/Family Comments/goals: Pt pleasant and willing to participate with therapy on this date.    Pain/Comfort:  · Pain Rating 1: 0/10      Objective:     Communicated with NSG prior to session.  Patient found HOB elevated with blood pressure cuff, pulse ox (continuous), telemetry, oxygen upon PT entry to room.     General Precautions: Standard, airborne, contact, droplet, fall   Orthopedic Precautions:N/A    Braces: N/A     Functional Mobility:  · Bed Mobility:     · Rolling Right: stand by assistance  · Scooting: stand by assistance  · Supine to Sit: stand by assistance  · Transfers:     · Sit to Stand:  stand by assistance with rolling walker  · Gait: 33ft SBA w/RW on 4L O2 w/no c/o SOB  · Balance: SBA      AM-PAC 6 CLICK MOBILITY  Turning over in bed (including adjusting bedclothes, sheets and blankets)?: 3  Sitting down on and standing up from a chair with arms (e.g., wheelchair, bedside commode, etc.): 3  Moving from lying on back to sitting on the side of the bed?: 3  Moving to and from a bed to a chair (including a wheelchair)?: 3  Need to walk in hospital room?: 3  Climbing 3-5 steps with a railing?: 3  Basic Mobility Total Score: 18       Therapeutic Activities and Exercises:   - Static Standing: 3mins SBA performing ADL tasks   - Pt educated on:   -PT roles, expectations, and POC    -Safety with mobility   -Benefits of OOB activities to increase strength and functional mobility    -Performing ther ex for increasing LE ROM and strength   -Discharge recommendations     Patient left up in chair with all lines intact, call button in reach and NSG notified..    GOALS:   Multidisciplinary Problems     Physical Therapy Goals        Problem: Physical Therapy Goal    Goal Priority Disciplines Outcome Goal Variances Interventions   Physical Therapy Goal     PT, PT/OT Ongoing, Progressing     Description: Goals to be met by: 2020    Patient will increase functional independence with mobility by performin. Supine to sit with Modified [place]  2. Sit to supine with Modified [place]  3. Sit to stand transfer with Modified [place]  4. Gait  x 20 feet with Stand-by Assistance using LRAD -Met 9/3/2020   5. Lower extremity exercise program x15 reps, with independence                      Time Tracking:     PT Received On: 20  PT Start Time: 1034     PT Stop Time: 1101  PT Total Time (min): 27  min     Billable Minutes: Gait Training 27    Treatment Type: Treatment  PT/PTA: PT           Jj Resendez, PT  09/03/2020

## 2020-09-04 LAB — SARS-COV-2 RDRP RESP QL NAA+PROBE: NEGATIVE

## 2020-09-04 PROCEDURE — 94799 UNLISTED PULMONARY SVC/PX: CPT

## 2020-09-04 PROCEDURE — 94640 AIRWAY INHALATION TREATMENT: CPT

## 2020-09-04 PROCEDURE — 25000003 PHARM REV CODE 250: Performed by: INTERNAL MEDICINE

## 2020-09-04 PROCEDURE — 99233 PR SUBSEQUENT HOSPITAL CARE,LEVL III: ICD-10-PCS | Mod: ,,, | Performed by: HOSPITALIST

## 2020-09-04 PROCEDURE — 11000001 HC ACUTE MED/SURG PRIVATE ROOM

## 2020-09-04 PROCEDURE — 25000242 PHARM REV CODE 250 ALT 637 W/ HCPCS: Performed by: HOSPITALIST

## 2020-09-04 PROCEDURE — 27000221 HC OXYGEN, UP TO 24 HOURS

## 2020-09-04 PROCEDURE — 25000003 PHARM REV CODE 250: Performed by: HOSPITALIST

## 2020-09-04 PROCEDURE — 99233 SBSQ HOSP IP/OBS HIGH 50: CPT | Mod: ,,, | Performed by: HOSPITALIST

## 2020-09-04 PROCEDURE — 97530 THERAPEUTIC ACTIVITIES: CPT

## 2020-09-04 PROCEDURE — 94761 N-INVAS EAR/PLS OXIMETRY MLT: CPT

## 2020-09-04 PROCEDURE — 63600175 PHARM REV CODE 636 W HCPCS: Performed by: HOSPITALIST

## 2020-09-04 PROCEDURE — U0002 COVID-19 LAB TEST NON-CDC: HCPCS

## 2020-09-04 PROCEDURE — 97535 SELF CARE MNGMENT TRAINING: CPT

## 2020-09-04 RX ORDER — PREDNISONE 5 MG/1
10 TABLET ORAL DAILY
Status: COMPLETED | OUTPATIENT
Start: 2020-09-07 | End: 2020-09-09

## 2020-09-04 RX ORDER — MICONAZOLE NITRATE 2 %
POWDER (GRAM) TOPICAL 2 TIMES DAILY
Status: DISCONTINUED | OUTPATIENT
Start: 2020-09-04 | End: 2020-09-09 | Stop reason: HOSPADM

## 2020-09-04 RX ORDER — PREDNISONE 5 MG/1
5 TABLET ORAL DAILY
Status: DISCONTINUED | OUTPATIENT
Start: 2020-09-10 | End: 2020-09-09 | Stop reason: HOSPADM

## 2020-09-04 RX ORDER — PREDNISONE 20 MG/1
20 TABLET ORAL DAILY
Status: COMPLETED | OUTPATIENT
Start: 2020-09-04 | End: 2020-09-06

## 2020-09-04 RX ADMIN — TIOTROPIUM BROMIDE 18 MCG: 18 CAPSULE ORAL; RESPIRATORY (INHALATION) at 08:09

## 2020-09-04 RX ADMIN — AMLODIPINE BESYLATE 10 MG: 10 TABLET ORAL at 08:09

## 2020-09-04 RX ADMIN — METOPROLOL TARTRATE 25 MG: 25 TABLET, FILM COATED ORAL at 09:09

## 2020-09-04 RX ADMIN — ATORVASTATIN CALCIUM 40 MG: 20 TABLET, FILM COATED ORAL at 08:09

## 2020-09-04 RX ADMIN — FLUTICASONE FUROATE AND VILANTEROL TRIFENATATE 1 PUFF: 100; 25 POWDER RESPIRATORY (INHALATION) at 08:09

## 2020-09-04 RX ADMIN — HYDRALAZINE HYDROCHLORIDE 25 MG: 25 TABLET ORAL at 08:09

## 2020-09-04 RX ADMIN — HYDRALAZINE HYDROCHLORIDE 25 MG: 25 TABLET ORAL at 09:09

## 2020-09-04 RX ADMIN — PREDNISONE 20 MG: 20 TABLET ORAL at 09:09

## 2020-09-04 RX ADMIN — IPRATROPIUM BROMIDE AND ALBUTEROL SULFATE 3 ML: .5; 2.5 SOLUTION RESPIRATORY (INHALATION) at 02:09

## 2020-09-04 RX ADMIN — IPRATROPIUM BROMIDE AND ALBUTEROL SULFATE 3 ML: .5; 2.5 SOLUTION RESPIRATORY (INHALATION) at 08:09

## 2020-09-04 RX ADMIN — LEVETIRACETAM 500 MG: 500 TABLET ORAL at 08:09

## 2020-09-04 RX ADMIN — CYANOCOBALAMIN TAB 250 MCG 500 MCG: 250 TAB at 08:09

## 2020-09-04 RX ADMIN — GUAIFENESIN AND DEXTROMETHORPHAN HYDROBROMIDE 1 TABLET: 600; 30 TABLET, EXTENDED RELEASE ORAL at 09:09

## 2020-09-04 RX ADMIN — GUAIFENESIN AND DEXTROMETHORPHAN HYDROBROMIDE 1 TABLET: 600; 30 TABLET, EXTENDED RELEASE ORAL at 08:09

## 2020-09-04 RX ADMIN — METOPROLOL TARTRATE 25 MG: 25 TABLET, FILM COATED ORAL at 08:09

## 2020-09-04 RX ADMIN — HYDRALAZINE HYDROCHLORIDE 25 MG: 25 TABLET ORAL at 01:09

## 2020-09-04 RX ADMIN — LEVETIRACETAM 500 MG: 500 TABLET ORAL at 09:09

## 2020-09-04 RX ADMIN — HYDRALAZINE HYDROCHLORIDE 25 MG: 25 TABLET ORAL at 05:09

## 2020-09-04 NOTE — PLAN OF CARE
Pt alert and oriented x4. VSS. Rested well through night. Denies any shortness of breath, pain or discomfort. Safety maintained. Will continue to monitor.

## 2020-09-04 NOTE — PLAN OF CARE
Problem: Occupational Therapy Goal  Goal: Occupational Therapy Goal  Description: Goals to be met by: 9/29/2020     Patient will increase functional independence with ADLs by performing:    UE Dressing with [place].  LE Dressing with [place].  Grooming while standing with Stand-by Assistance.  Toileting from toilet with Stand-by Assistance for hygiene and clothing management.   Rolling to Bilateral with [place].   Supine to sit with [place].  Step transfer with Stand-by Assistance  Toilet transfer to toilet with Stand-by Assistance.  Pt to demonstrate pursed lip breathing for ADL tasks to reflect Sp02 WNL.  Pt to complete ADL tasks with <1 rest break for cardiovascular needs.  Continue OT as tolerated.  Rashmi Calixto OT  9/4/2020    Outcome: Ongoing, Progressing

## 2020-09-04 NOTE — PT/OT/SLP PROGRESS
Occupational Therapy   Treatment    Name: Melvi Conrad  MRN: [id number]  Admitting Diagnosis:  Acute hypoxemic respiratory failure       Recommendations:     Discharge Recommendations: nursing facility, skilled  Discharge Equipment Recommendations:  other (see comments)(tbd)  Barriers to discharge:  Inaccessible home environment    Assessment:     Melvi Conrad is a 74 y.o. female with a medical diagnosis of Acute hypoxemic respiratory failure. Pt presents with decreased endurance and impaired mobility performance as limited by cardiovascular status and generalized weakness. Pt found [facility] eating breakfast however very eager to mobilize with writing OT. Pt continues to make excellent progress towards overall mobility goals, utilization of safe walker management, and increases activity tolerance during each session. Today, pt completed bedroom mobility followed by standing ADL tasks at sink while on 4L 02. Pt desat ~91 and used RW with SBA. Pt rapport established throughout hospital stay with writing therapist. Pt voicing concerns related to SNF placement (simply medication management and prolonged hospitalization worries), frustrations related to virus and its impact on her family dynamic, and pt feeling currently isolated from hobbies outside in community. OT provided therapeutic listening and understanding. Pt also requesting bedside bath with supplies provided by writing therapist but also PCT Belkys notified for additional bathing needs. Pt voiced appreciation and continues to show daily gains in therapy. Pt still is at risk for falls and is not safe to return home.  Performance deficits affecting function are weakness, impaired self care skills, impaired balance, impaired endurance, impaired functional mobilty, gait instability, impaired cardiopulmonary response to activity, decreased lower extremity function. Pt would benefit from continued OT skilled services 4x/wk to improve daily living skills to optimize  QOL.  Pt is recommended to discharge to SNF at this time.      Rehab Prognosis:  Good; patient would benefit from acute skilled OT services to address these deficits and reach maximum level of function.       Plan:     Patient to be seen 4 x/week to address the above listed problems via self-care/home management, therapeutic activities, therapeutic exercises  · Plan of Care Expires: 09/29/20  · Plan of Care Reviewed with: patient    Subjective     Pain/Comfort:  · Pain Rating 1: 0/10  · Pain Rating 2: 0/10    Objective:     Communicated with: RN prior to session.  Patient found up in chair with blood pressure cuff, telemetry, oxygen, pulse ox (continuous) upon OT entry to room.    General Precautions: Standard, airborne, droplet, fall, contact   Orthopedic Precautions:N/A   Braces: N/A     Occupational Performance:     Bed Mobility:    · NT as pt found [facility]     Functional Mobility/Transfers:  · Patient completed Sit <> Stand Transfer with stand by assistance  with  rolling walker   · Functional Mobility: Pt completed bedroom mobility (~1 lap) followed by standing grooming tasks at sink (~5 min) using RW with SBA. Pt on 4L 02 however 02 maintaining <90% with pt demonstrating improvements in pacing/pursed lip breathing.     Activities of Daily Living:  · Feeding:  (I) while up in chair  for breakfast   · Grooming: stand by assistance brushing teeth and washing face standing at sink ~5 min  · Bathing: setup (A) with warmed towelettes and body soaps provided [facility]   · Lower Body Dressing: total assistance donning  socks for pt while [facility] 6 Click ADL: 20    Treatment & Education:  Pt educated on role of occupational therapy, POC, and safety during ADLs and functional mobility. Pt and OT discussed importance of safe, continued mobility to optimize daily living skills. Pt verbalized understanding.   Pt completed the following during session: sit<stand t/f, bedroom mobility, standing grooming tasks, setup  for bath while [facility] (*PCT Belkys also notified [id number]), thorough discussion related to POC, concerns with virus and its impact on family/friends, and discussion related to overall hospitalization. emotional support provided with pt voicing appreciation. White board updated during session. Pt given instruction to call for medical staff/nurse for assistance.       Patient left up in chair with all lines intact, call button in reach, RN Yuli and PCT notified and pt's RN  presentEducation:      GOALS:   Multidisciplinary Problems     Occupational Therapy Goals        Problem: Occupational Therapy Goal    Goal Priority Disciplines Outcome Interventions   Occupational Therapy Goal     OT, PT/OT Ongoing, Progressing    Description: Goals to be met by: 9/29/2020     Patient will increase functional independence with ADLs by performing:    UE Dressing with [place].  LE Dressing with [place].  Grooming while standing with Stand-by Assistance.  Toileting from toilet with Stand-by Assistance for hygiene and clothing management.   Rolling to Bilateral with [place].   Supine to sit with [place].  Step transfer with Stand-by Assistance  Toilet transfer to toilet with Stand-by Assistance.  Pt to demonstrate pursed lip breathing for ADL tasks to reflect Sp02 WNL.  Pt to complete ADL tasks with <1 rest break for cardiovascular needs.                   Time Tracking:     OT Date of Treatment: 09/04/20  OT Start Time: 0858  OT Stop Time: 0940  OT Total Time (min): 42 min    Billable Minutes:Self Care/Home Management 12 min  Therapeutic Activity 30 min    Rashmi Calixto OT  9/4/2020

## 2020-09-05 PROBLEM — G40.909 SEIZURE DISORDER: Status: ACTIVE | Noted: 2020-09-05

## 2020-09-05 PROBLEM — I10 ESSENTIAL HYPERTENSION: Chronic | Status: ACTIVE | Noted: 2020-09-05

## 2020-09-05 PROBLEM — D53.9 MACROCYTIC ANEMIA: Status: ACTIVE | Noted: 2020-08-28

## 2020-09-05 LAB
ANION GAP SERPL CALC-SCNC: 7 MMOL/L (ref 8–16)
BASOPHILS # BLD AUTO: 0.02 K/UL (ref 0–0.2)
BASOPHILS NFR BLD: 0.2 % (ref 0–1.9)
BUN SERPL-MCNC: 34 MG/DL (ref 8–23)
CALCIUM SERPL-MCNC: 8.8 MG/DL (ref 8.7–10.5)
CHLORIDE SERPL-SCNC: 103 MMOL/L (ref 95–110)
CO2 SERPL-SCNC: 28 MMOL/L (ref 23–29)
CREAT SERPL-MCNC: 0.9 MG/DL (ref 0.5–1.4)
CRP SERPL-MCNC: 20.5 MG/L (ref 0–8.2)
DIFFERENTIAL METHOD: ABNORMAL
EOSINOPHIL # BLD AUTO: 0 K/UL (ref 0–0.5)
EOSINOPHIL NFR BLD: 0.5 % (ref 0–8)
ERYTHROCYTE [DISTWIDTH] IN BLOOD BY AUTOMATED COUNT: 20.3 % (ref 11.5–14.5)
EST. GFR  (AFRICAN AMERICAN): >60 ML/MIN/1.73 M^2
EST. GFR  (NON AFRICAN AMERICAN): >60 ML/MIN/1.73 M^2
FERRITIN SERPL-MCNC: 214 NG/ML (ref 20–300)
GLUCOSE SERPL-MCNC: 106 MG/DL (ref 70–110)
HCT VFR BLD AUTO: 30.8 % (ref 37–48.5)
HGB BLD-MCNC: 9.2 G/DL (ref 12–16)
IMM GRANULOCYTES # BLD AUTO: 0.27 K/UL (ref 0–0.04)
IMM GRANULOCYTES NFR BLD AUTO: 3.3 % (ref 0–0.5)
LYMPHOCYTES # BLD AUTO: 1.5 K/UL (ref 1–4.8)
LYMPHOCYTES NFR BLD: 17.7 % (ref 18–48)
MAGNESIUM SERPL-MCNC: 1.8 MG/DL (ref 1.6–2.6)
MCH RBC QN AUTO: 32.5 PG (ref 27–31)
MCHC RBC AUTO-ENTMCNC: 29.9 G/DL (ref 32–36)
MCV RBC AUTO: 109 FL (ref 82–98)
MONOCYTES # BLD AUTO: 0.7 K/UL (ref 0.3–1)
MONOCYTES NFR BLD: 8.2 % (ref 4–15)
NEUTROPHILS # BLD AUTO: 5.8 K/UL (ref 1.8–7.7)
NEUTROPHILS NFR BLD: 70.1 % (ref 38–73)
NRBC BLD-RTO: 1 /100 WBC
PHOSPHATE SERPL-MCNC: 3.7 MG/DL (ref 2.7–4.5)
PLATELET # BLD AUTO: 189 K/UL (ref 150–350)
PMV BLD AUTO: 10.4 FL (ref 9.2–12.9)
POTASSIUM SERPL-SCNC: 4.2 MMOL/L (ref 3.5–5.1)
RBC # BLD AUTO: 2.83 M/UL (ref 4–5.4)
SODIUM SERPL-SCNC: 138 MMOL/L (ref 136–145)
WBC # BLD AUTO: 8.26 K/UL (ref 3.9–12.7)

## 2020-09-05 PROCEDURE — 84100 ASSAY OF PHOSPHORUS: CPT

## 2020-09-05 PROCEDURE — 85025 COMPLETE CBC W/AUTO DIFF WBC: CPT

## 2020-09-05 PROCEDURE — 25000003 PHARM REV CODE 250: Performed by: HOSPITALIST

## 2020-09-05 PROCEDURE — 82728 ASSAY OF FERRITIN: CPT

## 2020-09-05 PROCEDURE — 11000001 HC ACUTE MED/SURG PRIVATE ROOM

## 2020-09-05 PROCEDURE — 27000221 HC OXYGEN, UP TO 24 HOURS

## 2020-09-05 PROCEDURE — 36415 COLL VENOUS BLD VENIPUNCTURE: CPT

## 2020-09-05 PROCEDURE — 99232 SBSQ HOSP IP/OBS MODERATE 35: CPT | Mod: ,,, | Performed by: HOSPITALIST

## 2020-09-05 PROCEDURE — 25000003 PHARM REV CODE 250: Performed by: INTERNAL MEDICINE

## 2020-09-05 PROCEDURE — 94640 AIRWAY INHALATION TREATMENT: CPT

## 2020-09-05 PROCEDURE — 99900035 HC TECH TIME PER 15 MIN (STAT)

## 2020-09-05 PROCEDURE — 94761 N-INVAS EAR/PLS OXIMETRY MLT: CPT

## 2020-09-05 PROCEDURE — 63600175 PHARM REV CODE 636 W HCPCS: Performed by: HOSPITALIST

## 2020-09-05 PROCEDURE — 83735 ASSAY OF MAGNESIUM: CPT

## 2020-09-05 PROCEDURE — 99232 PR SUBSEQUENT HOSPITAL CARE,LEVL II: ICD-10-PCS | Mod: ,,, | Performed by: HOSPITALIST

## 2020-09-05 PROCEDURE — 80048 BASIC METABOLIC PNL TOTAL CA: CPT

## 2020-09-05 PROCEDURE — 86140 C-REACTIVE PROTEIN: CPT

## 2020-09-05 PROCEDURE — 25000242 PHARM REV CODE 250 ALT 637 W/ HCPCS: Performed by: HOSPITALIST

## 2020-09-05 RX ORDER — IPRATROPIUM BROMIDE AND ALBUTEROL SULFATE 2.5; .5 MG/3ML; MG/3ML
3 SOLUTION RESPIRATORY (INHALATION)
Qty: 1 BOX | Refills: 0
Start: 2020-09-06 | End: 2021-09-06

## 2020-09-05 RX ORDER — GUAIFENESIN 600 MG/1
600 TABLET, EXTENDED RELEASE ORAL 2 TIMES DAILY
Status: DISCONTINUED | OUTPATIENT
Start: 2020-09-05 | End: 2020-09-09 | Stop reason: HOSPADM

## 2020-09-05 RX ORDER — ALBUTEROL SULFATE 90 UG/1
2 AEROSOL, METERED RESPIRATORY (INHALATION) EVERY 4 HOURS PRN
Start: 2020-09-05

## 2020-09-05 RX ORDER — FUROSEMIDE 10 MG/ML
20 INJECTION INTRAMUSCULAR; INTRAVENOUS ONCE
Status: COMPLETED | OUTPATIENT
Start: 2020-09-05 | End: 2020-09-05

## 2020-09-05 RX ORDER — MICONAZOLE NITRATE 2 %
POWDER (GRAM) TOPICAL 2 TIMES DAILY
Refills: 0
Start: 2020-09-05

## 2020-09-05 RX ORDER — POTASSIUM CHLORIDE 750 MG/1
30 CAPSULE, EXTENDED RELEASE ORAL ONCE
Status: COMPLETED | OUTPATIENT
Start: 2020-09-05 | End: 2020-09-05

## 2020-09-05 RX ORDER — ACETAMINOPHEN 325 MG/1
650 TABLET ORAL EVERY 4 HOURS PRN
Refills: 0
Start: 2020-09-05

## 2020-09-05 RX ORDER — TIOTROPIUM BROMIDE 18 UG/1
1 CAPSULE ORAL; RESPIRATORY (INHALATION) DAILY
Refills: 0
Start: 2020-09-06 | End: 2021-09-06

## 2020-09-05 RX ADMIN — METOPROLOL TARTRATE 25 MG: 25 TABLET, FILM COATED ORAL at 08:09

## 2020-09-05 RX ADMIN — LEVETIRACETAM 500 MG: 500 TABLET ORAL at 08:09

## 2020-09-05 RX ADMIN — ATORVASTATIN CALCIUM 40 MG: 20 TABLET, FILM COATED ORAL at 08:09

## 2020-09-05 RX ADMIN — PREDNISONE 20 MG: 20 TABLET ORAL at 08:09

## 2020-09-05 RX ADMIN — MICONAZOLE NITRATE: 20 POWDER TOPICAL at 08:09

## 2020-09-05 RX ADMIN — CYANOCOBALAMIN TAB 250 MCG 500 MCG: 250 TAB at 08:09

## 2020-09-05 RX ADMIN — GUAIFENESIN AND DEXTROMETHORPHAN HYDROBROMIDE 1 TABLET: 600; 30 TABLET, EXTENDED RELEASE ORAL at 08:09

## 2020-09-05 RX ADMIN — IPRATROPIUM BROMIDE AND ALBUTEROL SULFATE 3 ML: .5; 2.5 SOLUTION RESPIRATORY (INHALATION) at 07:09

## 2020-09-05 RX ADMIN — HYDRALAZINE HYDROCHLORIDE 25 MG: 25 TABLET ORAL at 08:09

## 2020-09-05 RX ADMIN — TIOTROPIUM BROMIDE 18 MCG: 18 CAPSULE ORAL; RESPIRATORY (INHALATION) at 08:09

## 2020-09-05 RX ADMIN — IPRATROPIUM BROMIDE AND ALBUTEROL SULFATE 3 ML: .5; 2.5 SOLUTION RESPIRATORY (INHALATION) at 01:09

## 2020-09-05 RX ADMIN — FLUTICASONE FUROATE AND VILANTEROL TRIFENATATE 1 PUFF: 100; 25 POWDER RESPIRATORY (INHALATION) at 08:09

## 2020-09-05 RX ADMIN — IPRATROPIUM BROMIDE AND ALBUTEROL SULFATE 3 ML: .5; 2.5 SOLUTION RESPIRATORY (INHALATION) at 08:09

## 2020-09-05 RX ADMIN — HYDRALAZINE HYDROCHLORIDE 25 MG: 25 TABLET ORAL at 12:09

## 2020-09-05 RX ADMIN — AMLODIPINE BESYLATE 10 MG: 10 TABLET ORAL at 08:09

## 2020-09-05 RX ADMIN — FUROSEMIDE 20 MG: 10 INJECTION, SOLUTION INTRAMUSCULAR; INTRAVENOUS at 04:09

## 2020-09-05 RX ADMIN — HYDRALAZINE HYDROCHLORIDE 25 MG: 25 TABLET ORAL at 04:09

## 2020-09-05 RX ADMIN — POTASSIUM CHLORIDE 30 MEQ: 750 CAPSULE, EXTENDED RELEASE ORAL at 04:09

## 2020-09-05 RX ADMIN — HYDRALAZINE HYDROCHLORIDE 25 MG: 25 TABLET ORAL at 09:09

## 2020-09-05 RX ADMIN — GUAIFENESIN 600 MG: 600 TABLET, EXTENDED RELEASE ORAL at 04:09

## 2020-09-05 RX ADMIN — MICONAZOLE NITRATE: 20 POWDER TOPICAL at 09:09

## 2020-09-05 NOTE — PROGRESS NOTES
Hospital Medicine  Progress Note  [facility]      Patient Name: Melvi Conrad  MRN: [id number]  Patient Class: IP- Inpatient   Admission Date: 8/28/2020  Length of Stay: 8 days  Attending Physician: Manuel Macias MD  Primary Care Provider: Andrae Jj MD          Principal Problem:  Acute hypoxemic respiratory failure      Hospital Course:  Patient was readmitted after a recent hospitalization for COVID pneumonia at [facility] followed by a stay at a skilled nursing facility.  Concerned that since patient was in the COVID unit not receiving nebulizer treatments but her symptoms became worse than developed a COPD exacerbation.  Patient was started on treatment with empiric antibiotics bronchodilators and glucocorticoids with improvement in her symptoms she remains more hypoxic than she previously had been with O2 requirements of 4-5 L. Patient has been seen by PT OT who recommended return to the skilled nursing setting patient and family are okay with this but under the assumption or if patient is to receive nebulizer treatments upon discharge due to concerns based on her anxiety and overall debility that if she does not receive these treatments that patient would likely end up readmitted again    Interval History:     Today spoke with  had retested patient with COVID swab and was found that on rapid COVID patient was negative social work related information shot toe and ultimately they reported that shot toe said patient would still be quarantine in the COVID unit for 5 days before transitioning to a regular floor as she was COVID positive on admission this hospital stay.  Due to concern that patient may deteriorate in outpatient setting will continue to watch her inpatient over the weekend     Review of Systems   Constitutional: Negative for fever.   Respiratory: Positive for cough, shortness of breath and wheezing.    Cardiovascular: Negative for chest pain and leg  swelling.   Gastrointestinal: Negative for abdominal distention, abdominal pain, constipation and diarrhea.   Genitourinary: Negative for dysuria.          Physical Exam:  Vital Signs (Most Recent):  Temp: 98.7 °F (37.1 °C) (09/04/20 2041)  Pulse: 80 (09/04/20 2041)  Resp: 18 (09/04/20 2041)  BP: 139/62 (09/04/20 2041)  SpO2: (!) 94 % (09/04/20 2045) Vital Signs (24h Range):  Temp:  [98 °F (36.7 °C)-98.7 °F (37.1 °C)] 98.7 °F (37.1 °C)  Pulse:  [59-88] 80  Resp:  [16-46] 18  SpO2:  [90 %-99 %] 94 %  BP: (133-154)/(62-99) 139/62   Intake/[place]:  This Shift:  No intake/output data recorded.    Net I/O past 24h:     Intake/Output Summary (Last 24 hours) at 9/5/2020 0000  Last data filed at 9/4/2020 0600  Gross per 24 hour   Intake 0 ml   Output 0 ml   Net 0 ml         Weight: 81.2 kg (179 lb)  Body mass index is 36.15 kg/m².    Physical Exam  Constitutional:       Appearance: She is obese. She is ill-appearing.   HENT:      Head: Normocephalic and atraumatic.      Mouth/Throat:      Mouth: Mucous membranes are moist.   Eyes:      General: No scleral icterus.     Extraocular Movements: Extraocular movements intact.      Pupils: Pupils are equal, round, and reactive to light.   Cardiovascular:      Rate and Rhythm: Normal rate and regular rhythm.   Pulmonary:      Comments: Decreased breath sounds bilaterally some expiratory wheezes heard and basilar inspiratory crackles.  Patient speaks in short sentences has prolonged expiratory phase  Abdominal:      General: Bowel sounds are normal.      Palpations: Abdomen is soft.   Skin:     General: Skin is warm and dry.   Neurological:      General: No focal deficit present.      Mental Status: She is alert.                   Laboratory:  Lab Results   Component Value Date    [id number] Negative 09/04/2020       Recent Labs   Lab 09/01/20  0423 09/02/20  0911 09/03/20  0038   WBC 11.26 9.07 9.08   LYMPH 12.3*  1.4 16.5*  1.5 12.9*  1.2   HGB 9.3* 8.9* 8.7*   HCT 28.8*  29.8* 27.7*    235 228     Recent Labs   Lab 09/01/20  0423 09/03/20  0401 09/03/20  1258    138 136   K 5.0 5.7* 4.4    102 99   CO2 27 23 27   BUN 24* 30* 29*   CREATININE 0.8 0.9 0.9   * 122* 106   CALCIUM 8.8 9.2 8.8     Recent Labs   Lab 08/29/20  0343 08/30/20  0855   ALKPHOS 76 63   ALT 21 20   AST 27 28   ALBUMIN 2.7* 2.7*   PROT 7.3 7.3   BILITOT 0.5 0.4        Recent Labs     09/03/20  0401   FERRITIN 269   CRP 13.6*       All labs within the last 24 hours were reviewed.     Microbiology:  Microbiology Results (last 7 days)     ** No results found for the last 168 hours. **            Imaging  All imaging within the last 24 hours was reviewed.   ECG Results          Repeat EKG 12-lead (Final result)  Result time 08/29/20 22:21:04    Final result by Interface, Lab In [facility] [place] (08/29/20 22:21:04)                 Narrative:    Test Reason : R79.89,    Vent. Rate : 073 BPM     Atrial Rate : 073 BPM     P-R Int : 152 ms          QRS Dur : 072 ms      QT Int : 402 ms       P-R-T Axes : 105 060 144 degrees     QTc Int : 442 ms    Baseline Artifact  Normal sinus rhythm  Nonspecific ST and/or T wave abnormalities  Abnormal ECG  When compared with ECG of 28-AUG-2020 19:07,  No significant change was found  Confirmed by Ruddy Ruby MD ([id number]) on 8/29/2020 10:20:54 PM    Referred By: System System           Confirmed By:Ruddy Ruby MD                             EKG 12-lead (Final result)  Result time 08/29/20 22:08:57    Final result by Interface, Lab In [facility] [place] (08/29/20 22:08:57)                 Narrative:    Test Reason : R68.89,    Vent. Rate : 068 BPM     Atrial Rate : 068 BPM     P-R Int : 152 ms          QRS Dur : 078 ms      QT Int : 414 ms       P-R-T Axes : 002 051 142 degrees     QTc Int : 440 ms    Baseline Artifact  Baseline wander  Normal sinus rhythm  ST-t wave changes of strain or ischemia  Abnormal ECG  When compared with ECG of 29-JUL-2014 06:14,  No significant change was  found  Confirmed by Tung DENNIS, Ruddy ([id number]) on 8/29/2020 10:08:46 PM    Referred By: System System           Confirmed By:Ruddy Ruby MD                              Results for orders placed during the hospital encounter of 08/28/20   Echo Color Flow Doppler? Yes    Narrative · Normal left ventricular systolic function. The estimated ejection   fraction is 65%.  · Grade II (moderate) left ventricular diastolic dysfunction consistent   with pseudonormalization.  · No wall motion abnormalities.  · Normal right ventricular systolic function.  · Mild left atrial enlargement.  · Aortic valve area is 1.02 cm2; peak velocity is 3.55 m/s; mean gradient   is 27 mmHg.  · Moderate aortic valve stenosis.  · Mild mitral regurgitation.  · Mild tricuspid regurgitation.  · The estimated PA systolic pressure is 42 mmHg.  · Pulmonary hypertension present.  · Intermediate central venous pressure (8 mmHg).  · There is a right pleural effusion.          Echo Color Flow Doppler? Yes  · Normal left ventricular systolic function. The estimated ejection   fraction is 65%.  · Grade II (moderate) left ventricular diastolic dysfunction consistent   with pseudonormalization.  · No wall motion abnormalities.  · Normal right ventricular systolic function.  · Mild left atrial enlargement.  · Aortic valve area is 1.02 cm2; peak velocity is 3.55 m/s; mean gradient   is 27 mmHg.  · Moderate aortic valve stenosis.  · Mild mitral regurgitation.  · Mild tricuspid regurgitation.  · The estimated PA systolic pressure is 42 mmHg.  · Pulmonary hypertension present.  · Intermediate central venous pressure (8 mmHg).  · There is a right pleural effusion.         Inpatient Medications:  Scheduled Meds:   albuterol-ipratropium  3 mL Nebulization Q6H WAKE    amLODIPine  10 mg Oral Daily    atorvastatin  40 mg Oral Daily    cyanocobalamin  500 mcg Oral Daily    dextromethorphan-guaifenesin  mg  1 tablet Oral BID    fluticasone furoate-vilanteroL  1 puff  Inhalation Daily    hydrALAZINE  25 mg Oral QID    levETIRAcetam  500 mg Oral BID    metoprolol tartrate  25 mg Oral BID    miconazole NITRATE 2 %   Topical (Top) BID    predniSONE  20 mg Oral Daily    Followed by    [START ON 9/7/2020] predniSONE  10 mg Oral Daily    Followed by    [START ON 9/10/2020] predniSONE  5 mg Oral Daily    tiotropium  1 capsule Inhalation Daily                             Continuous Infusions:  PRN Meds:.sodium chloride, acetaminophen, albuterol, benzonatate, glucose, glucose, melatonin, ondansetron, promethazine (PHENERGAN) IVPB, promethazine-codeine 6.25-10 mg/5 ml, sodium chloride, sodium chloride 0.9%    Assessment and Plan:    Viral Pneumonia due to COVID-19  - COVID-19 testing:   - Isolation: Airborne/Droplet. Surgical mask on patient. Notify Infection Control  - Diagnostics: Trend Q48hrs if stable, more frequently if patient decompensan  - Management: per [facility] COVID Treatment Protocol (4/15/20)    - Monitoring:   - Telemetry & Continuous Pulse Oximetry    - Nutrition:    - Multivitamin PO daily   - Add Boost supplement   - Vitamin D 1000IU daily if deficient   - Ascorbic acid 500mg PO bid    - Supportive Care:   - acetaminophen 650mg PO Q6hr PRN fever/headache   - loperamide PRN viral diarrhea   - IVF if indicated, restrictive strategy preferred, no maintenance IV if able   - VTE PPx: enoxaparin or heparin SQ unless contraindicated    - Antibiotics:  - if indications, CXR findings, elevated procal. See protocol for alternatives.   - Discontinue early if low concern for bacterial co-infection  -s/p antibiotic course.     - Investigational Therapies:   - If patient meets criteria  - atorvastatin 40mg po daily      Acute Hypoxemic Respiratory Failure_COPD exacerbation  -continue patient on Breo and scheduled DuoNebs  -starting Spiriva on patient also has additive therapy  -patient remains on 4 L an when patient begins talking at a more rapid pace she has desaturations does  seem to have higher O2 sats on this amount of supplemental oxygen than she did yesterday still has decreased breath sounds an abnormal exam no acute wheezing noted heart  -patient may require a longer period of glucocorticoid therapy have started a taper for patient over the next 9 days.  Prednisone 20 mg x 3 days starting now, followed by gradual decrease every 3 days       Anemia  - Hgb 7.0 on presentation, baseline unclear. Pt. Denies any melena or hematochezia  - Pt. Recently started On xarelto per SNF paperwork, but no diagnosis of blood clot listed and pt. Denies ever having one  - CTA ordered to rule out PE  - will hold xarelto in meantime given the anemia  - Continue home B12 supplementation  - Monitor CBC and transfuse to goal >7.   - transfused one unit pRBC on 8/31 for Hb 6.6.   - Hgb remains stable.   - [facility] reports that Hb must be > 9 for admission      Anticoagulation use  - on Xarelto at home  - family report that patient was prescribed Xarelto as ppx in the setting of COVID infection   - discontinued Xarelto  - US upper and lower extremities negative for DVT  - no h/o afib   - followup with PCP to discuss resuming Xarelto, concern that this contributed to acue on chronic anemia patient had earlier on hospital stay      HTN  - Continue home meds norvasc, hydralazine, and lopressor     Hx of CAD  - Per SNF paperwork, full history unclear  - Pt. Denies any active chest pain, no ST changes compared to prior EKG  -Troponin mildly elevated at 0.092, but this appears to be chronic and is not surprising in setting of demand from hypoxia.   - Low-suspicion for ACS, but will continue to trend troponin.   - TTE ordered given the patient's reported cardiac history and acitve hypoxia without a baseline/recent TTE in system  -Continue statin             Goals of care, counseling/discussion  - Discussed again as part of routine daily evaluation, patient/POA maintains code status of Full Code    Patient's  chronic/stable medical conditions noted in the problem list above will be managed with the patient's home medications as tolerated.     Active Hospital Problems    Diagnosis  POA    *Acute hypoxemic respiratory failure [J96.01]  Yes     Priority: 1 - High    COPD exacerbation [J44.1]  Yes     Priority: 2     COVID-19 [U07.1]  Yes     Priority: 3     Anemia [D64.9]  Yes     Priority: 4       Resolved Hospital Problems   No resolved problems to display.       VTE Risk Mitigation (From admission, onward)         Ordered     IP VTE HIGH RISK PATIENT  Once      08/28/20 2223     Place sequential compression device  Until discontinued      08/28/20 2223                High Risk Conditions:  Patient has a condition that poses threat to life and bodily function: Severe Respiratory Distress      Subsequent Hospital Care      Level 3 66142 Total visit time was 35 minutes or greater with greater than 50% of time spent in counseling and coordination of care.           Manuel Macias M.D.  Attending Physician  [facility] Medicine Dept.  Pager: [phone]  Spectralink -x [phone]

## 2020-09-06 LAB
BNP SERPL-MCNC: 147 PG/ML (ref 0–99)
FOLATE SERPL-MCNC: 9.3 NG/ML (ref 4–24)
VIT B12 SERPL-MCNC: 764 PG/ML (ref 210–950)

## 2020-09-06 PROCEDURE — 82746 ASSAY OF FOLIC ACID SERUM: CPT

## 2020-09-06 PROCEDURE — 25000003 PHARM REV CODE 250: Performed by: INTERNAL MEDICINE

## 2020-09-06 PROCEDURE — 36415 COLL VENOUS BLD VENIPUNCTURE: CPT

## 2020-09-06 PROCEDURE — 63600175 PHARM REV CODE 636 W HCPCS: Performed by: INTERNAL MEDICINE

## 2020-09-06 PROCEDURE — 99233 PR SUBSEQUENT HOSPITAL CARE,LEVL III: ICD-10-PCS | Mod: ,,, | Performed by: INTERNAL MEDICINE

## 2020-09-06 PROCEDURE — 94640 AIRWAY INHALATION TREATMENT: CPT

## 2020-09-06 PROCEDURE — 25000003 PHARM REV CODE 250: Performed by: HOSPITALIST

## 2020-09-06 PROCEDURE — 85025 COMPLETE CBC W/AUTO DIFF WBC: CPT

## 2020-09-06 PROCEDURE — 25000242 PHARM REV CODE 250 ALT 637 W/ HCPCS: Performed by: HOSPITALIST

## 2020-09-06 PROCEDURE — 83921 ORGANIC ACID SINGLE QUANT: CPT

## 2020-09-06 PROCEDURE — 27000221 HC OXYGEN, UP TO 24 HOURS

## 2020-09-06 PROCEDURE — 11000001 HC ACUTE MED/SURG PRIVATE ROOM

## 2020-09-06 PROCEDURE — 63600175 PHARM REV CODE 636 W HCPCS: Performed by: HOSPITALIST

## 2020-09-06 PROCEDURE — 82607 VITAMIN B-12: CPT

## 2020-09-06 PROCEDURE — 94761 N-INVAS EAR/PLS OXIMETRY MLT: CPT

## 2020-09-06 PROCEDURE — 99900035 HC TECH TIME PER 15 MIN (STAT)

## 2020-09-06 PROCEDURE — 99233 SBSQ HOSP IP/OBS HIGH 50: CPT | Mod: ,,, | Performed by: INTERNAL MEDICINE

## 2020-09-06 PROCEDURE — 83880 ASSAY OF NATRIURETIC PEPTIDE: CPT

## 2020-09-06 RX ORDER — FUROSEMIDE 10 MG/ML
20 INJECTION INTRAMUSCULAR; INTRAVENOUS ONCE
Status: COMPLETED | OUTPATIENT
Start: 2020-09-06 | End: 2020-09-06

## 2020-09-06 RX ADMIN — HYDRALAZINE HYDROCHLORIDE 25 MG: 25 TABLET ORAL at 02:09

## 2020-09-06 RX ADMIN — METOPROLOL TARTRATE 25 MG: 25 TABLET, FILM COATED ORAL at 08:09

## 2020-09-06 RX ADMIN — AMLODIPINE BESYLATE 10 MG: 10 TABLET ORAL at 10:09

## 2020-09-06 RX ADMIN — IPRATROPIUM BROMIDE AND ALBUTEROL SULFATE 3 ML: .5; 2.5 SOLUTION RESPIRATORY (INHALATION) at 01:09

## 2020-09-06 RX ADMIN — MICONAZOLE NITRATE: 20 POWDER TOPICAL at 09:09

## 2020-09-06 RX ADMIN — MICONAZOLE NITRATE: 20 POWDER TOPICAL at 10:09

## 2020-09-06 RX ADMIN — GUAIFENESIN 600 MG: 600 TABLET, EXTENDED RELEASE ORAL at 08:09

## 2020-09-06 RX ADMIN — FOLIC ACID-PYRIDOXINE-CYANOCOBALAMIN TAB 2.5-25-2 MG 1 TABLET: 2.5-25-2 TAB at 10:09

## 2020-09-06 RX ADMIN — LEVETIRACETAM 500 MG: 500 TABLET ORAL at 08:09

## 2020-09-06 RX ADMIN — HYDRALAZINE HYDROCHLORIDE 25 MG: 25 TABLET ORAL at 08:09

## 2020-09-06 RX ADMIN — TIOTROPIUM BROMIDE 18 MCG: 18 CAPSULE ORAL; RESPIRATORY (INHALATION) at 07:09

## 2020-09-06 RX ADMIN — GUAIFENESIN AND DEXTROMETHORPHAN HYDROBROMIDE 1 TABLET: 600; 30 TABLET, EXTENDED RELEASE ORAL at 10:09

## 2020-09-06 RX ADMIN — FUROSEMIDE 20 MG: 10 INJECTION, SOLUTION INTRAMUSCULAR; INTRAVENOUS at 05:09

## 2020-09-06 RX ADMIN — METOPROLOL TARTRATE 25 MG: 25 TABLET, FILM COATED ORAL at 10:09

## 2020-09-06 RX ADMIN — IPRATROPIUM BROMIDE AND ALBUTEROL SULFATE 3 ML: .5; 2.5 SOLUTION RESPIRATORY (INHALATION) at 08:09

## 2020-09-06 RX ADMIN — IPRATROPIUM BROMIDE AND ALBUTEROL SULFATE 3 ML: .5; 2.5 SOLUTION RESPIRATORY (INHALATION) at 07:09

## 2020-09-06 RX ADMIN — HYDRALAZINE HYDROCHLORIDE 25 MG: 25 TABLET ORAL at 05:09

## 2020-09-06 RX ADMIN — LEVETIRACETAM 500 MG: 500 TABLET ORAL at 10:09

## 2020-09-06 RX ADMIN — GUAIFENESIN AND DEXTROMETHORPHAN HYDROBROMIDE 1 TABLET: 600; 30 TABLET, EXTENDED RELEASE ORAL at 08:09

## 2020-09-06 RX ADMIN — GUAIFENESIN 600 MG: 600 TABLET, EXTENDED RELEASE ORAL at 10:09

## 2020-09-06 RX ADMIN — PREDNISONE 20 MG: 20 TABLET ORAL at 10:09

## 2020-09-06 RX ADMIN — HYDRALAZINE HYDROCHLORIDE 25 MG: 25 TABLET ORAL at 10:09

## 2020-09-06 RX ADMIN — ATORVASTATIN CALCIUM 40 MG: 20 TABLET, FILM COATED ORAL at 10:09

## 2020-09-06 RX ADMIN — FLUTICASONE FUROATE AND VILANTEROL TRIFENATATE 1 PUFF: 100; 25 POWDER RESPIRATORY (INHALATION) at 07:09

## 2020-09-06 NOTE — PROGRESS NOTES
Hospital Medicine  Progress Note  [facility]      Patient Name: Melvi Conrad  MRN: [id number]  Patient Class: IP- Inpatient   Admission Date: 8/28/2020  Length of Stay: 8 days  Attending Physician: Manuel Macias MD  Primary Care Provider: Andrae Jj MD          Principal Problem:  Acute hypoxemic respiratory failure      Hospital Course:  Patient was readmitted after a recent hospitalization for COVID pneumonia at [facility] followed by a stay at a skilled nursing facility.  Concerned that since patient was in the COVID unit not receiving nebulizer treatments but her symptoms became worse than developed a COPD exacerbation.  Patient was started on treatment with empiric antibiotics bronchodilators and glucocorticoids with improvement in her symptoms she remains more hypoxic than she previously had been with O2 requirements of 4-5 L. Patient has been seen by PT OT who recommended return to the skilled nursing setting patient and family are okay with this but under the assumption or if patient is to receive nebulizer treatments upon discharge due to concerns based on her anxiety and overall debility that if she does not receive these treatments that patient would likely end up readmitted again    As of 9/4 she has a negative COVID19 test, [facility] medical doctor indicated that patient would still require 5 day COVID sears quarantine if she discharged on 4th.  Will keep patient over the weekend continue steroid taper and wean oxygen as tolerated, and will have to have case management re-evaluate with facility on Tuesday morning.  Will also be willing to retest patient if needed to allow [facility] to potentially place her in a non COVID units so that she can continue to receive nebulizer treatments while undergoing therapy    Interval History:     Today patient feels well, she is on 3L of oxygen today, O2 sats >95%    She is asking about a walker for her to use within the room,     She  is also c/o of swelling in her feet has some pitting edema, will trial a one time dose of IV Lasix and giving one time potassium dose with that.     Increasing guaifenisen dosing    Review of Systems   Constitutional: Negative for fever.   Respiratory: Positive for cough and shortness of breath. Negative for wheezing.    Cardiovascular: Negative for chest pain and leg swelling.   Gastrointestinal: Negative for abdominal distention, abdominal pain, constipation and diarrhea.   Genitourinary: Negative for dysuria.          Physical Exam:  Vital Signs (Most Recent):  Temp: 98.9 °F (37.2 °C) (09/05/20 1623)  Pulse: 74 (09/05/20 1953)  Resp: (!) 24 (09/05/20 1953)  BP: (!) 147/83 (09/05/20 1623)  SpO2: 96 % (09/05/20 1953) Vital Signs (24h Range):  Temp:  [98.5 °F (36.9 °C)-98.9 °F (37.2 °C)] 98.9 °F (37.2 °C)  Pulse:  [71-80] 74  Resp:  [18-25] 24  SpO2:  [92 %-100 %] 96 %  BP: (129-147)/(59-83) 147/83   Intake/[place]:  This Shift:  No intake/output data recorded.    Net I/O past 24h:   No intake or output data in the 24 hours ending 09/05/20 2003      Weight: 81.2 kg (179 lb)  Body mass index is 36.15 kg/m².    Physical Exam  Constitutional:       Appearance: She is obese. She is ill-appearing.   HENT:      Head: Normocephalic and atraumatic.      Mouth/Throat:      Mouth: Mucous membranes are moist.   Eyes:      General: No scleral icterus.     Extraocular Movements: Extraocular movements intact.      Pupils: Pupils are equal, round, and reactive to light.   Cardiovascular:      Rate and Rhythm: Normal rate and regular rhythm.   Pulmonary:      Comments: Decreased breath sounds bilaterally some expiratory wheezes heard and basilar inspiratory crackles.  Patient speaks in short sentences has prolonged expiratory phase  Abdominal:      General: Bowel sounds are normal.      Palpations: Abdomen is soft.   Skin:     General: Skin is warm and dry.   Neurological:      General: No focal deficit present.      Mental Status:  She is alert.        Laboratory:  Lab Results   Component Value Date    [id number] Negative 09/04/2020       Recent Labs   Lab 09/02/20  0911 09/03/20  0038 09/05/20  0044   WBC 9.07 9.08 8.26   LYMPH 16.5*  1.5 12.9*  1.2 17.7*  1.5   HGB 8.9* 8.7* 9.2*   HCT 29.8* 27.7* 30.8*    228 189     Recent Labs   Lab 09/03/20  0401 09/03/20  1258 09/05/20  0400    136 138   K 5.7* 4.4 4.2    99 103   CO2 23 27 28   BUN 30* 29* 34*   CREATININE 0.9 0.9 0.9   * 106 106   CALCIUM 9.2 8.8 8.8   MG  --   --  1.8   PHOS  --   --  3.7     Recent Labs   Lab 08/30/20  0855   ALKPHOS 63   ALT 20   AST 28   ALBUMIN 2.7*   PROT 7.3   BILITOT 0.4        Recent Labs     09/03/20  0401 09/05/20  0400   FERRITIN 269 214   CRP 13.6* 20.5*       All labs within the last 24 hours were reviewed.     Microbiology:  Microbiology Results (last 7 days)     ** No results found for the last 168 hours. **            Imaging  All imaging within the last 24 hours was reviewed.   ECG Results          Repeat EKG 12-lead (Final result)  Result time 08/29/20 22:21:04    Final result by Interface, Lab In [facility] (08/29/20 22:21:04)                 Narrative:    Test Reason : R79.89,    Vent. Rate : 073 BPM     Atrial Rate : 073 BPM     P-R Int : 152 ms          QRS Dur : 072 ms      QT Int : 402 ms       P-R-T Axes : 105 060 144 degrees     QTc Int : 442 ms    Baseline Artifact  Normal sinus rhythm  Nonspecific ST and/or T wave abnormalities  Abnormal ECG  When compared with ECG of 28-AUG-2020 19:07,  No significant change was found  Confirmed by Ruddy Ruby MD ([id number]) on 8/29/2020 10:20:54 PM    Referred By: System System           Confirmed By:Ruddy Ruby MD                             EKG 12-lead (Final result)  Result time 08/29/20 22:08:57    Final result by Interface, Lab In [facility] (08/29/20 22:08:57)                 Narrative:    Test Reason : R68.89,    Vent. Rate : 068 BPM     Atrial Rate : 068 BPM     P-R Int : 152  ms          QRS Dur : 078 ms      QT Int : 414 ms       P-R-T Axes : 002 051 142 degrees     QTc Int : 440 ms    Baseline Artifact  Baseline wander  Normal sinus rhythm  ST-t wave changes of strain or ischemia  Abnormal ECG  When compared with ECG of 29-JUL-2014 06:14,  No significant change was found  Confirmed by Tung DENNIS, Ruddy ([id number]) on 8/29/2020 10:08:46 PM    Referred By: System System           Confirmed By:Ruddy Ruby MD                              Results for orders placed during the hospital encounter of 08/28/20   Echo Color Flow Doppler? Yes    Narrative · Normal left ventricular systolic function. The estimated ejection   fraction is 65%.  · Grade II (moderate) left ventricular diastolic dysfunction consistent   with pseudonormalization.  · No wall motion abnormalities.  · Normal right ventricular systolic function.  · Mild left atrial enlargement.  · Aortic valve area is 1.02 cm2; peak velocity is 3.55 m/s; mean gradient   is 27 mmHg.  · Moderate aortic valve stenosis.  · Mild mitral regurgitation.  · Mild tricuspid regurgitation.  · The estimated PA systolic pressure is 42 mmHg.  · Pulmonary hypertension present.  · Intermediate central venous pressure (8 mmHg).  · There is a right pleural effusion.          Echo Color Flow Doppler? Yes  · Normal left ventricular systolic function. The estimated ejection   fraction is 65%.  · Grade II (moderate) left ventricular diastolic dysfunction consistent   with pseudonormalization.  · No wall motion abnormalities.  · Normal right ventricular systolic function.  · Mild left atrial enlargement.  · Aortic valve area is 1.02 cm2; peak velocity is 3.55 m/s; mean gradient   is 27 mmHg.  · Moderate aortic valve stenosis.  · Mild mitral regurgitation.  · Mild tricuspid regurgitation.  · The estimated PA systolic pressure is 42 mmHg.  · Pulmonary hypertension present.  · Intermediate central venous pressure (8 mmHg).  · There is a right pleural effusion.          Inpatient Medications:  Scheduled Meds:   albuterol-ipratropium  3 mL Nebulization Q6H WAKE    amLODIPine  10 mg Oral Daily    atorvastatin  40 mg Oral Daily    dextromethorphan-guaifenesin  mg  1 tablet Oral BID    fluticasone furoate-vilanteroL  1 puff Inhalation Daily    [START ON 9/6/2020] folic acid-vit B6-vit B12 2.5-25-2 mg  1 tablet Oral Daily    guaiFENesin  600 mg Oral BID    hydrALAZINE  25 mg Oral QID    levETIRAcetam  500 mg Oral BID    metoprolol tartrate  25 mg Oral BID    miconazole NITRATE 2 %   Topical (Top) BID    predniSONE  20 mg Oral Daily    Followed by    [START ON 9/7/2020] predniSONE  10 mg Oral Daily    Followed by    [START ON 9/10/2020] predniSONE  5 mg Oral Daily    tiotropium  1 capsule Inhalation Daily                             Continuous Infusions:  PRN Meds:.sodium chloride, acetaminophen, albuterol, benzonatate, glucose, glucose, melatonin, ondansetron, promethazine (PHENERGAN) IVPB, promethazine-codeine 6.25-10 mg/5 ml, sodium chloride, sodium chloride 0.9%    Assessment and Plan:    Viral Pneumonia due to COVID-19  - COVID-19 testing:   - Isolation: Airborne/Droplet.   - Diagnostics: Trend Q48hrs if stable, more frequently if patient decompensan  - Management:    - Telemetry & Continuous Pulse Oximetry    - Nutrition:    - Multivitamin PO daily   - Add Boost supplement   - Vitamin D 1000IU daily if deficient   - Ascorbic acid 500mg PO bid    - Supportive Care:   - acetaminophen 650mg PO Q6hr PRN fever/headache   - loperamide PRN viral diarrhea   - IVF if indicated, restrictive strategy preferred, no maintenance IV if able   - VTE PPx: enoxaparin or heparin SQ unless contraindicated    - Antibiotics  - -s/p antibiotic course.     - Investigational Therapies:- atorvastatin 40mg po daily      Acute Hypoxemic Respiratory Failure_COPD exacerbation  -continue patient on Breo and scheduled DuoNebs  -starting Spiriva on patient also has additive  therapy  -weaned to 3L today, requested walker for her room, and 1x dose of IV lasix which may help her overall pulmonary symptoms as well as her LE edema.   -Continue current glucocorticoid therapy with taper for patient over the next 9 days.  Prednisone 20 mg x 3 days starting now, followed by gradual decrease every 3 days       Anemia, macrocytic  - Hgb 7.0 on presentation, baseline unclear. Pt. Denies any melena or hematochezia  - Pt. Recently started On xarelto per SNF paperwork, but no diagnosis of blood clot listed and pt. Denies ever having one  - CTA ordered to rule out PE  - will hold xarelto in meantime given the anemia  - Continue home B12 supplementation  - Monitor CBC and transfuse to goal >7.   - transfused one unit pRBC on 8/31 for Hb 6.6.   - Hgb remains stable or rising  - [facility] reports that Hb must be > 9 for admission   -check Folate/B12/MMA levels, and give a FOLBIC supplement empirically     Anticoagulation use  - on Xarelto at home, family report that patient was prescribed Xarelto as ppx in the setting of COVID infection   - discontinued Xarelto this admission, no history of afib and has had  US upper and lower extremities negative for DVT  - followup with PCP to discuss resuming Xarelto, concern that this contributed to acue on chronic anemia patient had earlier on hospital stay      HTN  - Continue home meds norvasc, hydralazine, and lopressor     Hx of CAD  - Per SNF paperwork, full history unclear  - Pt. Denies any active chest pain, no ST changes compared to prior EKG  -Troponin mildly elevated at 0.092, but this appears to be chronic and is not surprising in setting of demand from hypoxia.   - Low-suspicion for ACS, but will continue to trend troponin.   - TTE ordered given the patient's reported cardiac history and acitve hypoxia without a baseline/recent TTE in system  -Continue statin             Goals of care, counseling/discussion  - Discussed again as part of routine daily  evaluation, patient/POA maintains code status of Full Code    Patient's chronic/stable medical conditions noted in the problem list above will be managed with the patient's home medications as tolerated.     Active Hospital Problems    Diagnosis  POA    *Acute hypoxemic respiratory failure [J96.01]  Yes     Priority: 1 - High    COPD exacerbation [J44.1]  Yes     Priority: 2     COVID-19 [U07.1]  Yes     Priority: 3     Macrocytic anemia [D53.9]  Yes     Priority: 4     Essential hypertension [I10]  Yes     Priority: 5      Chronic    Seizure disorder [G40.909]  Yes     Priority: 6       Resolved Hospital Problems   No resolved problems to display.       VTE Risk Mitigation (From admission, onward)         Ordered     IP VTE HIGH RISK PATIENT  Once      08/28/20 2223     Place sequential compression device  Until discontinued      08/28/20 2223                High Risk Conditions:  Patient has a condition that poses threat to life and bodily function: Severe Respiratory Distress, Acute hypoxic respiratory failure.                 Manuel Macias M.D.  Attending Physician  [facility] Medicine Dept.  Pager: [phone]  [facility] [phone]

## 2020-09-06 NOTE — PROGRESS NOTES
[facility] - ICU 14 [facility] Medicine  Telemedicine Progress Note    Patient Name: Melvi Conrad  MRN: [id number]  Patient Class: IP- Inpatient   Admission Date: 8/28/2020  Length of Stay: 9 days  Attending Physician: Kaitlynn Vail MD  Primary Care Provider: Andrae Jj MD    [facility] Medicine Team: [facility] VIRTUAL TEAM 10 Kaitlynn Vail MD  Virtual Telemedicine Progress Note  Start time: 1010  Chief complaint: Acute hypoxemic respiratory failure  The patient location is: [id number] A  The patient arrived at: 8/28/2020  6:44 PM  Present with the patient at the time of the telemed/virtual assessment: n/a  End time:  1014  Total time spent with patient: 4 min  I have assessed findings virtually using a telemedicine platform and with assistance of the bedside nurse or telemedicine presenter.  The attending portion of this evaluation, treatment, and documentation was performed per Kaitlynn Vail MD via audiovisual.    Patient was transferred to the telemedicine service on: 9/6/2020      Subjective:     Admission CC:   Chief Complaint   Patient presents with    COVID-19 Concerns     Diagnosed with covid on 08/21, called EMS for increasing shortness of breath today. Hx COPD.    Shortness of Breath     Follow up visit for: Acute hypoxemic respiratory failure    Interval History / Events Overnight:   The patient is able to provide adequate history. Additional history was obtained from past medical records and nurse. No significant events reported by Nursing.  Patient complains of LE edema. Symptoms have been unchanged since yesterday. Associated symptoms include: shortness of breath on exertion. Symptoms are stable. Alleviating factors include: medication: Lasix.  SpO2 96% on 3 L NC    Data reviewed 9/6/2020: Lab test(s) reviewed: H&H stable  Other diagnostic test reviewed Echo      Review of Systems   Constitutional: Negative for fatigue.   Respiratory: Positive for shortness of breath.     Cardiovascular: Positive for leg swelling.     Objective:     Vital Signs (Most Recent):  Temp: 98.3 °F (36.8 °C) (09/06/20 1600)  Pulse: 74 (09/06/20 1600)  Resp: (!) 25 (09/06/20 1600)  BP: 127/77 (09/06/20 1600)  SpO2: (!) 94 % (09/06/20 1600) Vital Signs (24h Range):  Temp:  [98.3 °F (36.8 °C)-98.7 °F (37.1 °C)] 98.3 °F (36.8 °C)  Pulse:  [55-83] 74  Resp:  [18-47] 25  SpO2:  [93 %-99 %] 94 %  BP: (119-161)/(58-92) 127/77     Weight: 81.2 kg (179 lb)  Body mass index is 36.15 kg/m².  No intake or output data in the 24 hours ending 09/06/20 1823   Physical Exam  Constitutional:       General: She is not in acute distress.     Appearance: Normal appearance. She is obese. She is not diaphoretic.   Eyes:      General: Lids are normal. No scleral icterus.        Right eye: No discharge.         Left eye: No discharge.      Conjunctiva/sclera: Conjunctivae normal.   Neck:      Trachea: Phonation normal.   Cardiovascular:      Rate and Rhythm: Normal rate.   Pulmonary:      Effort: Pulmonary effort is normal. Tachypnea present. No accessory muscle usage or respiratory distress.   Abdominal:      General: There is no distension.   Skin:     Coloration: Skin is pale.   Neurological:      Mental Status: She is alert and oriented to person, place, and time. She is not disoriented.   Psychiatric:         Attention and Perception: Attention normal.         Mood and Affect: Affect normal.         Speech: Speech normal.         Behavior: Behavior is cooperative.           Significant Labs:   Recent Labs   Lab 09/02/20  0911 09/03/20  0038 09/05/20  0044   WBC 9.07 9.08 8.26   HGB 8.9* 8.7* 9.2*   HCT 29.8* 27.7* 30.8*    228 189     Recent Labs   Lab 09/02/20  0911 09/03/20  0038 09/05/20  0044   GRAN 72.8  6.6 73.0  6.6 70.1  5.8   LYMPH 16.5*  1.5 12.9*  1.2 17.7*  1.5   MONO 7.9  0.7 9.0  0.8 8.2  0.7   EOS 0.1 0.0 0.0     Recent Labs   Lab 09/03/20  0401 09/03/20  1258 09/05/20  0400    136 138   K  5.7* 4.4 4.2    99 103   CO2 23 27 28   BUN 30* 29* 34*   CREATININE 0.9 0.9 0.9   * 106 106   CALCIUM 9.2 8.8 8.8   MG  --   --  1.8   PHOS  --   --  3.7     No results for input(s): ALKPHOS, ALT, AST, PROT, BILITOT, INR, LIPASE in the last 168 hours.  Procalcitonin (ng/mL)   Date Value   08/28/2020 0.07     Lactate (Lactic Acid) (mmol/L)   Date Value   08/28/2020 1.3     BNP (pg/mL)   Date Value   09/06/2020 147 (H)   08/28/2020 167 (H)   07/28/2014 91     CRP (mg/L)   Date Value   09/05/2020 20.5 (H)   09/03/2020 13.6 (H)   09/01/2020 34.7 (H)   08/28/2020 101.0 (H)     D-Dimer (mg/L FEU)   Date Value   08/28/2020 2.54 (H)   07/28/2014 4.19 (H)     Ferritin (ng/mL)   Date Value   09/05/2020 214   09/03/2020 269   09/01/2020 287   08/28/2020 353 (H)     LD (U/L)   Date Value   08/28/2020 432 (H)     Troponin I (ng/mL)   Date Value   08/29/2020 0.052 (H)   08/28/2020 0.092 (H)   07/30/2014 0.068 (H)   07/29/2014 0.071 (H)   07/29/2014 0.062 (H)   07/28/2014 0.057 (H)     CPK (U/L)   Date Value   08/28/2020 34   07/29/2014 167     SARS-CoV-2 RNA, Amplification, Qual (no units)   Date Value   09/04/2020 Negative   09/01/2020 Positive (A)         Significant Imaging:  Echo 8/30/20  · Normal left ventricular systolic function. The estimated ejection fraction is 65%.  · Grade II (moderate) left ventricular diastolic dysfunction consistent with pseudonormalization.  · No wall motion abnormalities.  · Normal right ventricular systolic function.  · Mild left atrial enlargement.  · Aortic valve area is 1.02 cm2; peak velocity is 3.55 m/s; mean gradient is 27 mmHg.  · Moderate aortic valve stenosis.  · Mild mitral regurgitation.  · Mild tricuspid regurgitation.  · The estimated PA systolic pressure is 42 mmHg.  · Pulmonary hypertension present.  · Intermediate central venous pressure (8 mmHg).  · There is a right pleural effusion.      Assessment/Plan:      Active Diagnoses:    Diagnosis Date Noted POA     PRINCIPAL PROBLEM:  Acute hypoxemic respiratory failure [J96.01] 08/28/2020 Yes    Essential hypertension [I10] 09/05/2020 Yes     Chronic    Seizure disorder [G40.909] 09/05/2020 Yes    COVID-19 virus infection [U07.1] 08/28/2020 Yes    COPD exacerbation [J44.1] 08/28/2020 Yes    Macrocytic anemia [D53.9] 08/28/2020 Yes      Problems Resolved During this Admission:       Overview / ICU Course:    Melvi Conrad is a 74 y.o. female admitted for Acute hypoxemic respiratory failure.    Inpatient Medications Prescribed for Management of Current Problems:     Scheduled Meds:    albuterol-ipratropium  3 mL Nebulization Q6H WAKE    amLODIPine  10 mg Oral Daily    atorvastatin  40 mg Oral Daily    dextromethorphan-guaifenesin  mg  1 tablet Oral BID    fluticasone furoate-vilanteroL  1 puff Inhalation Daily    folic acid-vit B6-vit B12 2.5-25-2 mg  1 tablet Oral Daily    guaiFENesin  600 mg Oral BID    hydrALAZINE  25 mg Oral QID    levETIRAcetam  500 mg Oral BID    metoprolol tartrate  25 mg Oral BID    miconazole NITRATE 2 %   Topical (Top) BID    [START ON 9/7/2020] predniSONE  10 mg Oral Daily    Followed by    [START ON 9/10/2020] predniSONE  5 mg Oral Daily    tiotropium  1 capsule Inhalation Daily     Continuous Infusions:   As Needed: sodium chloride, acetaminophen, albuterol, benzonatate, glucose, glucose, melatonin, ondansetron, promethazine (PHENERGAN) IVPB, promethazine-codeine 6.25-10 mg/5 ml, sodium chloride, sodium chloride 0.9%    Assessment and Plan by Problem    Viral Pneumonia due to COVID-19  - COVID-19 testing:   - Isolation: Airborne/Droplet.   - Diagnostics: Trend Q48hrs if stable, more frequently if patient decompensan  - Management:            - Telemetry & Continuous Pulse Oximetry    - Nutrition:               - Multivitamin PO daily              - Add Boost supplement              - Vitamin D 1000IU daily if deficient              - Ascorbic acid 500mg PO bid    -  Supportive Care:              - acetaminophen 650mg PO Q6hr PRN fever/headache              - loperamide PRN viral diarrhea              - IVF if indicated, restrictive strategy preferred, no maintenance IV if able              - VTE PPx: enoxaparin or heparin SQ unless contraindicated    - Antibiotics  - -s/p antibiotic course.     - Investigational Therapies:- atorvastatin 40mg po daily        Acute Hypoxemic Respiratory Failure_COPD exacerbation  -continue patient on Breo and scheduled DuoNebs  -started Spiriva; also has additive therapy  -weaned to 3L, requested walker for her room, and 1x dose of IV Lasix 9/5 which may help her overall pulmonary symptoms as well as her LE edema.   -Continue current glucocorticoid therapy with taper for patient over the next 9 days.  Prednisone 20 mg x 3 days starting now, followed by gradual decrease every 3 days.  - repeat Lasix IV x 1 on 9/6     Anemia, macrocytic  - Hgb 7.0 on presentation, baseline unclear. Denies any melena or hematochezia  - Recently started On arelto per SNF paperwork, but no diagnosis of blood clot listed and patient denies ever having one  - CTA ordered to rule out PE  - hold Xarelto in meantime given the anemia  - Continue home B12 supplementation  - Monitor CBC and transfuse to goal >7.   - transfused one unit pRBC on 8/31 for Hb 6.6.   - Hgb remains stable or rising  - SNF reports that Hb must be > 9 for admission   -  check Folate/B12/MMA levels, and give a FOLBIC supplement empirically     Anticoagulation use  - on Xarelto at home, family report that patient was prescribed Xarelto as ppx in the setting of COVID infection   - discontinued Xarelto this admission, no history of Afib and has had US upper and lower extremities negative for DVT  - follow up with PCP to discuss resuming Xarelto, concern that this contributed to acute on chronic anemia patient had earlier on hospital stay      HTN  - Continue home meds Norvasc, hydralazine, and  Lopressor     Hx of CAD  - Per SNF paperwork, full history unclear  - Denies any active chest pain, no ST changes compared to prior EKG  -Troponin mildly elevated at 0.092, but this appears to be chronic and is not surprising in setting of demand from hypoxia.   - Low-suspicion for ACS, but will continue to trend troponin.   - TTE ordered given the patient's reported cardiac history and acitve hypoxia without a baseline/recent TTE in system  - Continue statin      Diet: Diet Cardiac  GI Prophylaxis: Not indicated  Significant LDAs:   IV Access Type: Peripheral  Urinary Catheter Indication if present: Patient Does Not Have Urinary Catheter  Other Lines/Tubes/Drains:    HIGH RISK CONDITION(S):   Patient has a condition that poses threat to life and bodily function: Severe Respiratory Distress COVID19    Goals of Care:    Previous admission:  7/28/14  Likely prognosis:  Good  Code Status: Full Code  Comfort Only: No  Hospice: No  Goals at discharge: remain at home, live with family member    Discharge Planning   REANNA: 9/7/2020     Code Status: Full Code   Is the patient medically ready for discharge?: Yes    Reason for patient still in hospital (select all that apply): Patient trending condition and Treatment  Discharge Plan A: Skilled Nursing Facility        VTE Risk Mitigation (From admission, onward)         Ordered     IP VTE HIGH RISK PATIENT  Once      08/28/20 2223     Place sequential compression device  Until discontinued      08/28/20 2223                   Katilynn Vail MD  Department of Hospital Medicine   [facility] - ICU 14 [facility]

## 2020-09-06 NOTE — CONSULTS
[facility] - ICU 14 [facility] Medicine  Telemedicine Consult Note    Patient Name: Melvi Conrad  MRN: [id number]  Admission Date: 8/28/2020  Hospital Length of Stay: 8 days  Attending Physician: Manuel Macias MD   Primary Care Provider: Andrae Jj MD            Melvi Conrad has been accepted for transfer to [facility] and will be followed through telemedicine services beginning 09/06/20 at 7 AM.           Kaitlynn Vail MD  Department of Hospital Medicine   [facility] - ICU [facility]

## 2020-09-06 NOTE — PLAN OF CARE
Pt in chair, got up to toilet self, v/s stable all night, call light in reach, legs elevated in chair.

## 2020-09-07 LAB
ANION GAP SERPL CALC-SCNC: 11 MMOL/L (ref 8–16)
ANISOCYTOSIS BLD QL SMEAR: SLIGHT
BASOPHILS # BLD AUTO: 0.02 K/UL (ref 0–0.2)
BASOPHILS NFR BLD: 0.2 % (ref 0–1.9)
BUN SERPL-MCNC: 41 MG/DL (ref 8–23)
CALCIUM SERPL-MCNC: 9.2 MG/DL (ref 8.7–10.5)
CHLORIDE SERPL-SCNC: 98 MMOL/L (ref 95–110)
CO2 SERPL-SCNC: 28 MMOL/L (ref 23–29)
CREAT SERPL-MCNC: 1 MG/DL (ref 0.5–1.4)
DIFFERENTIAL METHOD: ABNORMAL
EOSINOPHIL # BLD AUTO: 0 K/UL (ref 0–0.5)
EOSINOPHIL NFR BLD: 0.2 % (ref 0–8)
ERYTHROCYTE [DISTWIDTH] IN BLOOD BY AUTOMATED COUNT: 19.7 % (ref 11.5–14.5)
EST. GFR  (AFRICAN AMERICAN): >60 ML/MIN/1.73 M^2
EST. GFR  (NON AFRICAN AMERICAN): 55.6 ML/MIN/1.73 M^2
GLUCOSE SERPL-MCNC: 117 MG/DL (ref 70–110)
HCT VFR BLD AUTO: 30.4 % (ref 37–48.5)
HGB BLD-MCNC: 9.3 G/DL (ref 12–16)
HYPOCHROMIA BLD QL SMEAR: ABNORMAL
IMM GRANULOCYTES # BLD AUTO: 0.18 K/UL (ref 0–0.04)
IMM GRANULOCYTES NFR BLD AUTO: 2.1 % (ref 0–0.5)
LYMPHOCYTES # BLD AUTO: 1.1 K/UL (ref 1–4.8)
LYMPHOCYTES NFR BLD: 12.5 % (ref 18–48)
MAGNESIUM SERPL-MCNC: 1.9 MG/DL (ref 1.6–2.6)
MCH RBC QN AUTO: 31.6 PG (ref 27–31)
MCHC RBC AUTO-ENTMCNC: 30.6 G/DL (ref 32–36)
MCV RBC AUTO: 103 FL (ref 82–98)
MONOCYTES # BLD AUTO: 0.5 K/UL (ref 0.3–1)
MONOCYTES NFR BLD: 6.1 % (ref 4–15)
NEUTROPHILS # BLD AUTO: 6.6 K/UL (ref 1.8–7.7)
NEUTROPHILS NFR BLD: 78.9 % (ref 38–73)
NRBC BLD-RTO: 0 /100 WBC
PHOSPHATE SERPL-MCNC: 4.5 MG/DL (ref 2.7–4.5)
PLATELET # BLD AUTO: 177 K/UL (ref 150–350)
PLATELET BLD QL SMEAR: ABNORMAL
PMV BLD AUTO: 10.5 FL (ref 9.2–12.9)
POLYCHROMASIA BLD QL SMEAR: ABNORMAL
POTASSIUM SERPL-SCNC: 4.5 MMOL/L (ref 3.5–5.1)
RBC # BLD AUTO: 2.94 M/UL (ref 4–5.4)
SODIUM SERPL-SCNC: 137 MMOL/L (ref 136–145)
WBC # BLD AUTO: 8.39 K/UL (ref 3.9–12.7)

## 2020-09-07 PROCEDURE — 80048 BASIC METABOLIC PNL TOTAL CA: CPT

## 2020-09-07 PROCEDURE — 99233 SBSQ HOSP IP/OBS HIGH 50: CPT | Mod: ,,, | Performed by: INTERNAL MEDICINE

## 2020-09-07 PROCEDURE — 63600175 PHARM REV CODE 636 W HCPCS: Performed by: INTERNAL MEDICINE

## 2020-09-07 PROCEDURE — 27000221 HC OXYGEN, UP TO 24 HOURS

## 2020-09-07 PROCEDURE — 25000242 PHARM REV CODE 250 ALT 637 W/ HCPCS: Performed by: HOSPITALIST

## 2020-09-07 PROCEDURE — 94799 UNLISTED PULMONARY SVC/PX: CPT

## 2020-09-07 PROCEDURE — 63600175 PHARM REV CODE 636 W HCPCS: Performed by: HOSPITALIST

## 2020-09-07 PROCEDURE — 94640 AIRWAY INHALATION TREATMENT: CPT

## 2020-09-07 PROCEDURE — 94761 N-INVAS EAR/PLS OXIMETRY MLT: CPT

## 2020-09-07 PROCEDURE — 11000001 HC ACUTE MED/SURG PRIVATE ROOM

## 2020-09-07 PROCEDURE — 25000003 PHARM REV CODE 250: Performed by: INTERNAL MEDICINE

## 2020-09-07 PROCEDURE — 83735 ASSAY OF MAGNESIUM: CPT

## 2020-09-07 PROCEDURE — 99233 PR SUBSEQUENT HOSPITAL CARE,LEVL III: ICD-10-PCS | Mod: ,,, | Performed by: INTERNAL MEDICINE

## 2020-09-07 PROCEDURE — 99900035 HC TECH TIME PER 15 MIN (STAT)

## 2020-09-07 PROCEDURE — 84100 ASSAY OF PHOSPHORUS: CPT

## 2020-09-07 PROCEDURE — 25000003 PHARM REV CODE 250: Performed by: HOSPITALIST

## 2020-09-07 RX ORDER — FUROSEMIDE 40 MG/1
40 TABLET ORAL ONCE
Status: COMPLETED | OUTPATIENT
Start: 2020-09-07 | End: 2020-09-07

## 2020-09-07 RX ORDER — FUROSEMIDE 10 MG/ML
20 INJECTION INTRAMUSCULAR; INTRAVENOUS ONCE
Status: DISCONTINUED | OUTPATIENT
Start: 2020-09-07 | End: 2020-09-07

## 2020-09-07 RX ADMIN — MICONAZOLE NITRATE: 20 POWDER TOPICAL at 09:09

## 2020-09-07 RX ADMIN — GUAIFENESIN AND DEXTROMETHORPHAN HYDROBROMIDE 1 TABLET: 600; 30 TABLET, EXTENDED RELEASE ORAL at 08:09

## 2020-09-07 RX ADMIN — FOLIC ACID-PYRIDOXINE-CYANOCOBALAMIN TAB 2.5-25-2 MG 1 TABLET: 2.5-25-2 TAB at 09:09

## 2020-09-07 RX ADMIN — LEVETIRACETAM 500 MG: 500 TABLET ORAL at 08:09

## 2020-09-07 RX ADMIN — LEVETIRACETAM 500 MG: 500 TABLET ORAL at 09:09

## 2020-09-07 RX ADMIN — HYDRALAZINE HYDROCHLORIDE 25 MG: 25 TABLET ORAL at 09:09

## 2020-09-07 RX ADMIN — IPRATROPIUM BROMIDE AND ALBUTEROL SULFATE 3 ML: .5; 2.5 SOLUTION RESPIRATORY (INHALATION) at 08:09

## 2020-09-07 RX ADMIN — METOPROLOL TARTRATE 25 MG: 25 TABLET, FILM COATED ORAL at 08:09

## 2020-09-07 RX ADMIN — ALBUTEROL SULFATE 2 PUFF: 90 AEROSOL, METERED RESPIRATORY (INHALATION) at 08:09

## 2020-09-07 RX ADMIN — IPRATROPIUM BROMIDE AND ALBUTEROL SULFATE 3 ML: .5; 2.5 SOLUTION RESPIRATORY (INHALATION) at 01:09

## 2020-09-07 RX ADMIN — HYDRALAZINE HYDROCHLORIDE 25 MG: 25 TABLET ORAL at 04:09

## 2020-09-07 RX ADMIN — PREDNISONE 10 MG: 5 TABLET ORAL at 09:09

## 2020-09-07 RX ADMIN — GUAIFENESIN 600 MG: 600 TABLET, EXTENDED RELEASE ORAL at 09:09

## 2020-09-07 RX ADMIN — HYDRALAZINE HYDROCHLORIDE 25 MG: 25 TABLET ORAL at 01:09

## 2020-09-07 RX ADMIN — FUROSEMIDE 40 MG: 40 TABLET ORAL at 04:09

## 2020-09-07 RX ADMIN — FLUTICASONE FUROATE AND VILANTEROL TRIFENATATE 1 PUFF: 100; 25 POWDER RESPIRATORY (INHALATION) at 08:09

## 2020-09-07 RX ADMIN — ATORVASTATIN CALCIUM 40 MG: 20 TABLET, FILM COATED ORAL at 09:09

## 2020-09-07 RX ADMIN — GUAIFENESIN 600 MG: 600 TABLET, EXTENDED RELEASE ORAL at 08:09

## 2020-09-07 RX ADMIN — METOPROLOL TARTRATE 25 MG: 25 TABLET, FILM COATED ORAL at 09:09

## 2020-09-07 RX ADMIN — TIOTROPIUM BROMIDE 18 MCG: 18 CAPSULE ORAL; RESPIRATORY (INHALATION) at 08:09

## 2020-09-07 RX ADMIN — AMLODIPINE BESYLATE 10 MG: 10 TABLET ORAL at 09:09

## 2020-09-07 RX ADMIN — HYDRALAZINE HYDROCHLORIDE 25 MG: 25 TABLET ORAL at 08:09

## 2020-09-07 RX ADMIN — GUAIFENESIN AND DEXTROMETHORPHAN HYDROBROMIDE 1 TABLET: 600; 30 TABLET, EXTENDED RELEASE ORAL at 09:09

## 2020-09-07 RX ADMIN — MICONAZOLE NITRATE: 20 POWDER TOPICAL at 08:09

## 2020-09-07 NOTE — PLAN OF CARE
Pt took all her medications, transferred to commode by self, slept all night, call light in reach, feet elevated, 3 L NC.

## 2020-09-07 NOTE — PROGRESS NOTES
[facility] - ICU 14 [facility] Medicine  Telemedicine Progress Note    Patient Name: Melvi Conrad  MRN: [id number]  Patient Class: IP- Inpatient   Admission Date: 8/28/2020  Length of Stay: 10 days  Attending Physician: Kaitlynn Vail MD  Primary Care Provider: Andrae Jj MD    [facility] Medicine Team: [facility] VIRTUAL TEAM 10 Kaitlynn Vail MD  Virtual Telemedicine Progress Note  Start time: 1316  Chief complaint: Acute hypoxemic respiratory failure  The patient location is: [id number] A  The patient arrived at: 8/28/2020  6:44 PM  Present with the patient at the time of the telemed/virtual assessment: n/a  End time:  1323  Total time spent with patient: 7 min  I have assessed findings virtually using a telemedicine platform and with assistance of the bedside nurse or telemedicine presenter.  The attending portion of this evaluation, treatment, and documentation was performed per Kaitlynn Vail MD via audiovisual.    Patient was transferred to the telemedicine service on: 9/6/2020      Subjective:     Admission CC:   Chief Complaint   Patient presents with    COVID-19 Concerns     Diagnosed with covid on 08/21, called EMS for increasing shortness of breath today. Hx COPD.    Shortness of Breath     Follow up visit for: Acute hypoxemic respiratory failure    Interval History / Events Overnight:   The patient is able to provide adequate history. Additional history was obtained from chart review. No significant events reported by Nursing. Lost IV access.  Patient complains of LE edema. Symptoms have improved since yesterday. Associated symptoms include: shortness of breath on exertion. Symptoms are decreasing in severity. Alleviating factors include: medication: Lasix.  SpO2 98% on 3 L NC    Data reviewed 9/7/2020: Lab test(s) reviewed: H&H stable. BNP remains elevated      Review of Systems   Constitutional: Negative for fatigue.   Respiratory: Positive for shortness of breath.     Cardiovascular: Positive for leg swelling.     Objective:     Vital Signs (Most Recent):  Temp: 98 °F (36.7 °C) (09/07/20 1200)  Pulse: 68 (09/07/20 1357)  Resp: (!) 23 (09/07/20 1357)  BP: 136/69 (09/07/20 1200)  SpO2: 98 % (09/07/20 1357) Vital Signs (24h Range):  Temp:  [97.4 °F (36.3 °C)-98.6 °F (37 °C)] 98 °F (36.7 °C)  Pulse:  [63-79] 68  Resp:  [16-32] 23  SpO2:  [95 %-99 %] 98 %  BP: (125-137)/(60-69) 136/69     Weight: 81.2 kg (179 lb)  Body mass index is 36.15 kg/m².    Intake/Output Summary (Last 24 hours) at 9/7/2020 1700  Last data filed at 9/7/2020 0745  Gross per 24 hour   Intake 230 ml   Output --   Net 230 ml      Physical Exam  Constitutional:       General: She is not in acute distress.     Appearance: Normal appearance. She is obese. She is not diaphoretic.   HENT:      Mouth/Throat:      Dentition: Abnormal dentition.   Eyes:      General: Lids are normal. No scleral icterus.        Right eye: No discharge.         Left eye: No discharge.      Conjunctiva/sclera: Conjunctivae normal.   Neck:      Trachea: Phonation normal.   Cardiovascular:      Rate and Rhythm: Normal rate.   Pulmonary:      Effort: Pulmonary effort is normal. Tachypnea present. No accessory muscle usage or respiratory distress.   Abdominal:      General: There is no distension.   Skin:     Coloration: Skin is pale.   Neurological:      Mental Status: She is alert and oriented to person, place, and time. She is not disoriented.   Psychiatric:         Attention and Perception: Attention normal.         Mood and Affect: Affect normal.         Speech: Speech normal.         Behavior: Behavior is cooperative.         Significant Labs:   Recent Labs   Lab 09/03/20  0038 09/05/20  0044 09/06/20  2354   WBC 9.08 8.26 8.39   HGB 8.7* 9.2* 9.3*   HCT 27.7* 30.8* 30.4*    189 177     Recent Labs   Lab 09/03/20  0038 09/05/20  0044 09/06/20  2354   GRAN 73.0  6.6 70.1  5.8 78.9*  6.6   LYMPH 12.9*  1.2 17.7*  1.5 12.5*  1.1    MONO 9.0  0.8 8.2  0.7 6.1  0.5   EOS 0.0 0.0 0.0     Recent Labs   Lab 09/03/20  1258 09/05/20  0400 09/07/20  0324    138 137   K 4.4 4.2 4.5   CL 99 103 98   CO2 27 28 28   BUN 29* 34* 41*   CREATININE 0.9 0.9 1.0    106 117*   CALCIUM 8.8 8.8 9.2   MG  --  1.8 1.9   PHOS  --  3.7 4.5     Procalcitonin (ng/mL)   Date Value   08/28/2020 0.07     Lactate (Lactic Acid) (mmol/L)   Date Value   08/28/2020 1.3     BNP (pg/mL)   Date Value   09/06/2020 147 (H)   08/28/2020 167 (H)   07/28/2014 91     CRP (mg/L)   Date Value   09/05/2020 20.5 (H)   09/03/2020 13.6 (H)   09/01/2020 34.7 (H)   08/28/2020 101.0 (H)     D-Dimer (mg/L FEU)   Date Value   08/28/2020 2.54 (H)   07/28/2014 4.19 (H)     Ferritin (ng/mL)   Date Value   09/05/2020 214   09/03/2020 269   09/01/2020 287   08/28/2020 353 (H)     LD (U/L)   Date Value   08/28/2020 432 (H)     Troponin I (ng/mL)   Date Value   08/29/2020 0.052 (H)   08/28/2020 0.092 (H)   07/30/2014 0.068 (H)   07/29/2014 0.071 (H)   07/29/2014 0.062 (H)   07/28/2014 0.057 (H)     CPK (U/L)   Date Value   08/28/2020 34   07/29/2014 167     SARS-CoV-2 RNA, Amplification, Qual (no units)   Date Value   09/04/2020 Negative   09/01/2020 Positive (A)         Significant Imaging:  Echo 8/30/20  · Normal left ventricular systolic function. The estimated ejection fraction is 65%.  · Grade II (moderate) left ventricular diastolic dysfunction consistent with pseudonormalization.  · No wall motion abnormalities.  · Normal right ventricular systolic function.  · Mild left atrial enlargement.  · Aortic valve area is 1.02 cm2; peak velocity is 3.55 m/s; mean gradient is 27 mmHg.  · Moderate aortic valve stenosis.  · Mild mitral regurgitation.  · Mild tricuspid regurgitation.  · The estimated PA systolic pressure is 42 mmHg.  · Pulmonary hypertension present.  · Intermediate central venous pressure (8 mmHg).  · There is a right pleural effusion.      Assessment/Plan:      Active  Diagnoses:    Diagnosis Date Noted POA    PRINCIPAL PROBLEM:  Acute hypoxemic respiratory failure [J96.01] 08/28/2020 Yes    Essential hypertension [I10] 09/05/2020 Yes     Chronic    Seizure disorder [G40.909] 09/05/2020 Yes    COVID-19 virus infection [U07.1] 08/28/2020 Yes    COPD exacerbation [J44.1] 08/28/2020 Yes    Macrocytic anemia [D53.9] 08/28/2020 Yes      Problems Resolved During this Admission:       Overview / ICU Course:    Melvi Conrad is a 74 y.o. female admitted for Acute hypoxemic respiratory failure.    Inpatient Medications Prescribed for Management of Current Problems:     Scheduled Meds:    albuterol-ipratropium  3 mL Nebulization Q6H WAKE    amLODIPine  10 mg Oral Daily    atorvastatin  40 mg Oral Daily    dextromethorphan-guaifenesin  mg  1 tablet Oral BID    fluticasone furoate-vilanteroL  1 puff Inhalation Daily    folic acid-vit B6-vit B12 2.5-25-2 mg  1 tablet Oral Daily    guaiFENesin  600 mg Oral BID    hydrALAZINE  25 mg Oral QID    levETIRAcetam  500 mg Oral BID    metoprolol tartrate  25 mg Oral BID    miconazole NITRATE 2 %   Topical (Top) BID    predniSONE  10 mg Oral Daily    Followed by    [START ON 9/10/2020] predniSONE  5 mg Oral Daily    tiotropium  1 capsule Inhalation Daily     Continuous Infusions:   As Needed: sodium chloride, acetaminophen, albuterol, benzonatate, glucose, glucose, melatonin, ondansetron, promethazine (PHENERGAN) IVPB, promethazine-codeine 6.25-10 mg/5 ml, sodium chloride, sodium chloride 0.9%    Assessment and Plan by Problem    Viral Pneumonia due to COVID-19  - COVID-19 testing:   - Isolation: Airborne/Droplet.   - Diagnostics: Trend Q48hrs if stable, more frequently if patient decompensan  - Management:            - Telemetry & Continuous Pulse Oximetry    - Nutrition:               - Multivitamin PO daily              - Add Boost supplement              - Vitamin D 1000IU daily if deficient              - Ascorbic acid  500mg PO bid    - Supportive Care:              - acetaminophen 650mg PO Q6hr PRN fever/headache              - loperamide PRN viral diarrhea              - IVF if indicated, restrictive strategy preferred, no maintenance IV if able              - VTE PPx: enoxaparin or heparin SQ unless contraindicated    - Antibiotics  - completed antibiotic course.     - Investigational Therapies:- atorvastatin 40mg po daily        Acute Hypoxemic Respiratory Failure_COPD exacerbation  -continue patient on Breo and scheduled DuoNebs  -started Spiriva; also has additive therapy  -weaned to 3L, requested walker for her room, and 1x dose of IV Lasix 9/5 which may help her overall pulmonary symptoms as well as her LE edema.   -Continue current glucocorticoid therapy with taper for patient over the next 9 days.  Prednisone 20 mg x 3 days, followed by gradual decrease every 3 days.  - repeated Lasix IV x 1 on 9/6  - trial of lasix 40 mg oral x 1 on 9/7     Anemia, macrocytic  - Hgb 7.0 on presentation, baseline unclear. Denies any melena or hematochezia  - Recently started On arelto per SNF paperwork, but no diagnosis of blood clot listed and patient denies ever having one  - CTA ordered to rule out PE  - hold Xarelto in meantime given the anemia  - Continue home B12 supplementation  - Monitor CBC and transfuse to goal >7.   - transfused one unit pRBC on 8/31 for Hb 6.6.   - Hgb remains stable or rising  - [facility] reports that Hb must be > 9 for admission - currently adequate  - check Folate/B12/MMA levels, and given a FOLBIC supplement empirically     Anticoagulation use  - on Xarelto at home, family report that patient was prescribed Xarelto as ppx in the setting of COVID infection   - discontinued Xarelto this admission, no history of Afib and has had US upper and lower extremities negative for DVT  - follow up with PCP to discuss resuming Xarelto, concern that this contributed to acute on chronic anemia patient had earlier on hospital  stay      HTN  - Continue home meds Norvasc, hydralazine, and Lopressor     Hx of CAD  - Per SNF paperwork, full history unclear  - Denies any active chest pain, no ST changes compared to prior EKG  -Troponin mildly elevated at 0.092, but this appears to be chronic and is not surprising in setting of demand from hypoxia.   - Low-suspicion for ACS, but will continue to trend troponin.   - TTE ordered given the patient's reported cardiac history and acitve hypoxia without a baseline/recent TTE in system  - Continue statin      Diet: Diet Cardiac  GI Prophylaxis: Not indicated  Significant LDAs:   IV Access Type: Peripheral  Urinary Catheter Indication if present: Patient Does Not Have Urinary Catheter  Other Lines/Tubes/Drains:    HIGH RISK CONDITION(S):   Patient has a condition that poses threat to life and bodily function: Severe Respiratory Distress COVID19 & COPD    Goals of Care:    Previous admission:  7/28/14  Likely prognosis:  Good  Code Status: Full Code  Comfort Only: No  Hospice: No  Goals at discharge: remain at home, live with family member    Discharge Planning   REANNA: 9/7/2020     Code Status: Full Code   Is the patient medically ready for discharge?: Yes    Reason for patient still in hospital (select all that apply): Patient trending condition and Treatment  Discharge Plan A: Skilled Nursing Facility        VTE Risk Mitigation (From admission, onward)         Ordered     IP VTE HIGH RISK PATIENT  Once      08/28/20 2223     Place sequential compression device  Until discontinued      08/28/20 2223                   Kaitlynn Vail MD  Department of Hospital Medicine   [facility] - ICU 14 [facility]

## 2020-09-08 LAB
ALBUMIN SERPL BCP-MCNC: 3.3 G/DL (ref 3.5–5.2)
ANION GAP SERPL CALC-SCNC: 14 MMOL/L (ref 8–16)
BASOPHILS # BLD AUTO: 0.05 K/UL (ref 0–0.2)
BASOPHILS NFR BLD: 0.5 % (ref 0–1.9)
BUN SERPL-MCNC: 35 MG/DL (ref 8–23)
CALCIUM SERPL-MCNC: 9.4 MG/DL (ref 8.7–10.5)
CHLORIDE SERPL-SCNC: 100 MMOL/L (ref 95–110)
CO2 SERPL-SCNC: 26 MMOL/L (ref 23–29)
CREAT SERPL-MCNC: 1.1 MG/DL (ref 0.5–1.4)
DIFFERENTIAL METHOD: ABNORMAL
EOSINOPHIL # BLD AUTO: 0.3 K/UL (ref 0–0.5)
EOSINOPHIL NFR BLD: 2.3 % (ref 0–8)
ERYTHROCYTE [DISTWIDTH] IN BLOOD BY AUTOMATED COUNT: 19.6 % (ref 11.5–14.5)
EST. GFR  (AFRICAN AMERICAN): 57.2 ML/MIN/1.73 M^2
EST. GFR  (NON AFRICAN AMERICAN): 49.6 ML/MIN/1.73 M^2
GLUCOSE SERPL-MCNC: 170 MG/DL (ref 70–110)
HCT VFR BLD AUTO: 34.6 % (ref 37–48.5)
HGB BLD-MCNC: 10.5 G/DL (ref 12–16)
IMM GRANULOCYTES # BLD AUTO: 0.12 K/UL (ref 0–0.04)
IMM GRANULOCYTES NFR BLD AUTO: 1.1 % (ref 0–0.5)
LYMPHOCYTES # BLD AUTO: 1.9 K/UL (ref 1–4.8)
LYMPHOCYTES NFR BLD: 17.4 % (ref 18–48)
MCH RBC QN AUTO: 32.7 PG (ref 27–31)
MCHC RBC AUTO-ENTMCNC: 30.3 G/DL (ref 32–36)
MCV RBC AUTO: 108 FL (ref 82–98)
MONOCYTES # BLD AUTO: 0.9 K/UL (ref 0.3–1)
MONOCYTES NFR BLD: 8.2 % (ref 4–15)
NEUTROPHILS # BLD AUTO: 7.8 K/UL (ref 1.8–7.7)
NEUTROPHILS NFR BLD: 70.5 % (ref 38–73)
NRBC BLD-RTO: 0 /100 WBC
PHOSPHATE SERPL-MCNC: 3.2 MG/DL (ref 2.7–4.5)
PLATELET # BLD AUTO: 179 K/UL (ref 150–350)
PMV BLD AUTO: 10.7 FL (ref 9.2–12.9)
POTASSIUM SERPL-SCNC: 3.3 MMOL/L (ref 3.5–5.1)
RBC # BLD AUTO: 3.21 M/UL (ref 4–5.4)
SODIUM SERPL-SCNC: 140 MMOL/L (ref 136–145)
WBC # BLD AUTO: 11.05 K/UL (ref 3.9–12.7)

## 2020-09-08 PROCEDURE — 97116 GAIT TRAINING THERAPY: CPT

## 2020-09-08 PROCEDURE — 11000001 HC ACUTE MED/SURG PRIVATE ROOM

## 2020-09-08 PROCEDURE — 36415 COLL VENOUS BLD VENIPUNCTURE: CPT

## 2020-09-08 PROCEDURE — 94799 UNLISTED PULMONARY SVC/PX: CPT

## 2020-09-08 PROCEDURE — 99233 SBSQ HOSP IP/OBS HIGH 50: CPT | Mod: ,,, | Performed by: INTERNAL MEDICINE

## 2020-09-08 PROCEDURE — 97535 SELF CARE MNGMENT TRAINING: CPT

## 2020-09-08 PROCEDURE — 94640 AIRWAY INHALATION TREATMENT: CPT

## 2020-09-08 PROCEDURE — 99233 PR SUBSEQUENT HOSPITAL CARE,LEVL III: ICD-10-PCS | Mod: ,,, | Performed by: INTERNAL MEDICINE

## 2020-09-08 PROCEDURE — 63600175 PHARM REV CODE 636 W HCPCS: Performed by: HOSPITALIST

## 2020-09-08 PROCEDURE — 25000003 PHARM REV CODE 250: Performed by: HOSPITALIST

## 2020-09-08 PROCEDURE — 94761 N-INVAS EAR/PLS OXIMETRY MLT: CPT

## 2020-09-08 PROCEDURE — 25000242 PHARM REV CODE 250 ALT 637 W/ HCPCS: Performed by: HOSPITALIST

## 2020-09-08 PROCEDURE — 25000003 PHARM REV CODE 250: Performed by: INTERNAL MEDICINE

## 2020-09-08 PROCEDURE — 85025 COMPLETE CBC W/AUTO DIFF WBC: CPT

## 2020-09-08 PROCEDURE — 99900035 HC TECH TIME PER 15 MIN (STAT)

## 2020-09-08 PROCEDURE — 80069 RENAL FUNCTION PANEL: CPT

## 2020-09-08 PROCEDURE — 27000221 HC OXYGEN, UP TO 24 HOURS

## 2020-09-08 RX ORDER — POTASSIUM CHLORIDE 20 MEQ/1
20 TABLET, EXTENDED RELEASE ORAL ONCE
Status: COMPLETED | OUTPATIENT
Start: 2020-09-09 | End: 2020-09-09

## 2020-09-08 RX ADMIN — LEVETIRACETAM 500 MG: 500 TABLET ORAL at 08:09

## 2020-09-08 RX ADMIN — MICONAZOLE NITRATE: 20 POWDER TOPICAL at 09:09

## 2020-09-08 RX ADMIN — HYDRALAZINE HYDROCHLORIDE 25 MG: 25 TABLET ORAL at 08:09

## 2020-09-08 RX ADMIN — MICONAZOLE NITRATE: 20 POWDER TOPICAL at 08:09

## 2020-09-08 RX ADMIN — GUAIFENESIN AND DEXTROMETHORPHAN HYDROBROMIDE 1 TABLET: 600; 30 TABLET, EXTENDED RELEASE ORAL at 08:09

## 2020-09-08 RX ADMIN — AMLODIPINE BESYLATE 10 MG: 10 TABLET ORAL at 09:09

## 2020-09-08 RX ADMIN — HYDRALAZINE HYDROCHLORIDE 25 MG: 25 TABLET ORAL at 09:09

## 2020-09-08 RX ADMIN — LEVETIRACETAM 500 MG: 500 TABLET ORAL at 09:09

## 2020-09-08 RX ADMIN — METOPROLOL TARTRATE 25 MG: 25 TABLET, FILM COATED ORAL at 08:09

## 2020-09-08 RX ADMIN — IPRATROPIUM BROMIDE AND ALBUTEROL SULFATE 3 ML: .5; 2.5 SOLUTION RESPIRATORY (INHALATION) at 01:09

## 2020-09-08 RX ADMIN — ATORVASTATIN CALCIUM 40 MG: 20 TABLET, FILM COATED ORAL at 09:09

## 2020-09-08 RX ADMIN — IPRATROPIUM BROMIDE AND ALBUTEROL SULFATE 3 ML: .5; 2.5 SOLUTION RESPIRATORY (INHALATION) at 07:09

## 2020-09-08 RX ADMIN — PREDNISONE 10 MG: 5 TABLET ORAL at 09:09

## 2020-09-08 RX ADMIN — FOLIC ACID-PYRIDOXINE-CYANOCOBALAMIN TAB 2.5-25-2 MG 1 TABLET: 2.5-25-2 TAB at 09:09

## 2020-09-08 RX ADMIN — IPRATROPIUM BROMIDE AND ALBUTEROL SULFATE 3 ML: .5; 2.5 SOLUTION RESPIRATORY (INHALATION) at 08:09

## 2020-09-08 RX ADMIN — METOPROLOL TARTRATE 25 MG: 25 TABLET, FILM COATED ORAL at 09:09

## 2020-09-08 RX ADMIN — GUAIFENESIN 600 MG: 600 TABLET, EXTENDED RELEASE ORAL at 09:09

## 2020-09-08 RX ADMIN — GUAIFENESIN AND DEXTROMETHORPHAN HYDROBROMIDE 1 TABLET: 600; 30 TABLET, EXTENDED RELEASE ORAL at 09:09

## 2020-09-08 RX ADMIN — TIOTROPIUM BROMIDE 18 MCG: 18 CAPSULE ORAL; RESPIRATORY (INHALATION) at 08:09

## 2020-09-08 RX ADMIN — GUAIFENESIN 600 MG: 600 TABLET, EXTENDED RELEASE ORAL at 08:09

## 2020-09-08 RX ADMIN — HYDRALAZINE HYDROCHLORIDE 25 MG: 25 TABLET ORAL at 12:09

## 2020-09-08 RX ADMIN — FLUTICASONE FUROATE AND VILANTEROL TRIFENATATE 1 PUFF: 100; 25 POWDER RESPIRATORY (INHALATION) at 08:09

## 2020-09-08 RX ADMIN — HYDRALAZINE HYDROCHLORIDE 25 MG: 25 TABLET ORAL at 04:09

## 2020-09-08 NOTE — PT/OT/SLP PROGRESS
Occupational Therapy   Treatment    Name: Melvi Conrad  MRN: [id number]  Admitting Diagnosis:  Acute hypoxemic respiratory failure       Recommendations:     Discharge Recommendations: nursing facility, skilled  Discharge Equipment Recommendations:  other (see comments)(tbd)  Barriers to discharge:  Inaccessible home environment    Assessment:     Melvi Conrad is a 74 y.o. female with a medical diagnosis of Acute hypoxemic respiratory failure. Pt presents with decreased endurance and impaired mobility performance as limited by cardiovascular status and generalized weakness.. Pt found UIC and eager to mobilize with therapy team. Pt session focused on bedroom and hallway mobility in preparation for IADL tasks at home. Pt on 3L 02 throughout session with pt at 90% Sp02 upon arrival, 87% Sp02 following hallway mobility, and 94% Sp02 once returned to room. Pt with chair follow utilized for safety 2/2 pt's cardiovascular needs. Pt required SBA for all mobility this date using a RW. Pt continue to show excellent progression in mobility/activity tolerance however hindered by cardiovascular needs. At this time, pt is not safe to return home and would benefit from continued skilled therapy.  Pt would benefit from continued OT skilled services 3x/wk to improve daily living skills to optimize QOL. Pt is recommended to discharge to SNF at this time.    Performance deficits affecting function are weakness, impaired self care skills, impaired endurance, impaired functional mobilty, decreased coordination, impaired cardiopulmonary response to activity.     Rehab Prognosis:  Good; patient would benefit from acute skilled OT services to address these deficits and reach maximum level of function.       Plan:     Patient to be seen 3 x/week to address the above listed problems via self-care/home management, therapeutic activities, therapeutic exercises  · Plan of Care Expires: 09/29/20  · Plan of Care Reviewed with:  patient    Subjective     Pain/Comfort:  · Pain Rating 1: 0/10  · Pain Rating Post-Intervention 1: 0/10  · Pain Rating Post-Intervention 2: 0/10    Objective:     Communicated with: RN prior to session.  Patient found up in chair with blood pressure cuff, telemetry, pulse ox (continuous), oxygen, peripheral IV upon OT entry to room.    General Precautions: Standard, airborne, droplet, fall, contact   Orthopedic Precautions:N/A   Braces: N/A     Occupational Performance:     Bed Mobility:    · NT as pt found UIC     Functional Mobility/Transfers:  · Patient completed Sit <> Stand Transfer with stand by assistance  with  rolling walker   · Functional Mobility: Pt completed x3 total sit<Stands this date with SBA using RW. Pt with good walker management throughout bedroom and hallway mobility. Chair follow utilized for safety. Pt required rest break following mobility seated for pulse ox measure readings. Pt on 3L 02.    Activities of Daily Living:  · Grooming: setup (A) with pt UIC to brush teeth and wash face   · Lower Body Dressing: setup (A) donning  socks while UIC       AMPAC 6 Click ADL: 20    Treatment & Education:  Pt educated on role of occupational therapy, POC, and safety during ADLs and functional mobility. Pt and OT discussed importance of safe, continued mobility to optimize daily living skills. Pt verbalized understanding.   Pt completed the following during session: LB dressing, sit<Stand t/f, bedroom and hallway mobility, seated grooming tasks. White board updated during session. Pt given instruction to call for medical staff/nurse for assistance.       Patient left up in chair with all lines intact, call button in reach and IVETTE Garcia  notifiedEducation:      GOALS:   Multidisciplinary Problems     Occupational Therapy Goals        Problem: Occupational Therapy Goal    Goal Priority Disciplines Outcome Interventions   Occupational Therapy Goal     OT, PT/OT Ongoing, Progressing    Description:  Goals to be met by: 9/29/2020     Patient will increase functional independence with ADLs by performing:    UE Dressing with [place].  LE Dressing with [place].  Grooming while standing with Stand-by Assistance.  Toileting from toilet with Stand-by Assistance for hygiene and clothing management.   Rolling to Bilateral with [place].   Supine to sit with [place].  Step transfer with Stand-by Assistance  Toilet transfer to toilet with Stand-by Assistance.  Pt to demonstrate pursed lip breathing for ADL tasks to reflect Sp02 WNL.  Pt to complete ADL tasks with <1 rest break for cardiovascular needs.                   Time Tracking:     OT Date of Treatment: 09/08/20  OT Start Time: 0910  OT Stop Time: 0942  OT Total Time (min): 32 min    Billable Minutes:Self Care/Home Management 32 min    Rashmi Calixto OT  9/8/2020

## 2020-09-08 NOTE — PLAN OF CARE
At the request of the attending physician JENNIE faxed the request for the release of information to [facility] JENNIE requested that the information be faxed  [phone] for the

## 2020-09-08 NOTE — PT/OT/SLP PROGRESS
Physical Therapy Treatment    Patient Name:  Melvi Conrad   MRN:  [id number]    Recommendations:     Discharge Recommendations:  nursing facility, skilled   Discharge Equipment Recommendations: (will determine DME needs closer to discharge.)   Barriers to discharge: Decreased caregiver support family may not be able to assist at current functional level.     Assessment:     Melvi Conrad is a 74 y.o. female admitted with a medical diagnosis of Acute hypoxemic respiratory failure.  She presents with the following impairments/functional limitations:  impaired endurance, gait instability, impaired balance, decreased coordination pt thang treatment better being able to gait train farther. Pt will benefit from cont skilled PT 3x/wk to progress physically. Pt will need SNF placement to maximize rehab potential.     Rehab Prognosis: Good; patient would benefit from acute skilled PT services to address these deficits and reach maximum level of function.    Recent Surgery: * No surgery found *      Plan:     During this hospitalization, patient to be seen 3 x/week to address the identified rehab impairments via gait training, therapeutic activities, therapeutic exercises and progress toward the following goals:    · Plan of Care Expires:  09/28/20    Subjective     Chief Complaint: pt c/o SOB with treatment.   Patient/Family Comments/goals:  To get better and go home.   Pain/Comfort:  · Pain Rating 1: 0/10  · Pain Rating Post-Intervention 1: 0/10      Objective:     Communicated with nurse prior to session.  Patient found up in chair with blood pressure cuff, telemetry, pulse ox (continuous), oxygen(hep lock IV) upon PT entry to room.     General Precautions: Standard, airborne, contact, droplet, fall   Orthopedic Precautions:N/A   Braces:       Functional Mobility:  · Transfers:     · Sit to Stand:  stand by assistance with rolling walker. Pt stood x 2 reps.  ·   · Gait: pt received gait training ~ 56 ft, 68 ft (124 ft total)  with RW, O2 intact, SBA and mask on. Pt had O2 3L. O2 sats at beginning of treatment 90%, 87% at sitting rest period between distance ambulated. Pt Sats increased to 94 % and returned to 92% at end of treatment. Pt was additional assist of 1 to follow with chair.       AM-PAC 6 CLICK MOBILITY  Turning over in bed (including adjusting bedclothes, sheets and blankets)?: 3  Sitting down on and standing up from a chair with arms (e.g., wheelchair, bedside commode, etc.): 3  Moving from lying on back to sitting on the side of the bed?: 3  Moving to and from a bed to a chair (including a wheelchair)?: 3  Need to walk in hospital room?: 3  Climbing 3-5 steps with a railing?: 1  Basic Mobility Total Score: 16       Therapeutic Activities and Exercises:   Pt received verbal instructions in PT POC and verbally expressed understanding of such.     Patient left up in chair with all lines intact and call button in reach..    GOALS:   Multidisciplinary Problems     Physical Therapy Goals        Problem: Physical Therapy Goal    Goal Priority Disciplines Outcome Goal Variances Interventions   Physical Therapy Goal     PT, PT/OT Ongoing, Progressing     Description: Goals to be met by: 2020    Patient will increase functional independence with mobility by performin. Supine to sit with Modified [place] -not met  2. Sit to supine with Modified [place] -not met  3. Sit to stand transfer with Modified [place] -not met  4. Gait  x 20 feet with Stand-by Assistance using LRAD -Met 9/3/2020   5. Lower extremity exercise program x15 reps, with independence -not met  6. Pt receive gait training ~ 150 ft with RW, SBA and O2 intact. - not met                      Time Tracking:     PT Received On: 20  PT Start Time: 915     PT Stop Time: 939  PT Total Time (min): 24 min     Billable Minutes: Gait Training 24 min    Treatment Type: Treatment  PT/PTA: PT     PTA Visit Number: 0     Mojgan Borjas  PT  09/08/2020

## 2020-09-08 NOTE — PLAN OF CARE
Patient AAOx4, vitals WDL with exception of elevated 160O SBP or 166. Schedule hydralazine given, will monitor.   L forearm midline placed this shift, saline flushed and locked.   3L NC in place.   Able to ambulate independently, remains free from injury for shift.   WCTM.

## 2020-09-08 NOTE — PLAN OF CARE
Problem: Occupational Therapy Goal  Goal: Occupational Therapy Goal  Description: Goals to be met by: 9/29/2020     Patient will increase functional independence with ADLs by performing:    UE Dressing with [place].  LE Dressing with [place].  Grooming while standing with Stand-by Assistance.  Toileting from toilet with Stand-by Assistance for hygiene and clothing management.   Rolling to Bilateral with [place].   Supine to sit with [place].  Step transfer with Stand-by Assistance  Toilet transfer to toilet with Stand-by Assistance.  Pt to demonstrate pursed lip breathing for ADL tasks to reflect Sp02 WNL.  Pt to complete ADL tasks with <1 rest break for cardiovascular needs.  Pt progressing well towards pursed lip breathing/cardiovascular goals today. Continue OT as tolerated.  Rashmi Calixto OT  9/8/2020    Outcome: Ongoing, Progressing

## 2020-09-08 NOTE — PLAN OF CARE
Recommendations    1. Continue cardiac diet + Boost Plus as tolerated.     2. Encourage po and ONS intake.     3. RD following.     Goals: continue to consume >75% of meals by RD follow-up  Nutrition Goal Status: new

## 2020-09-08 NOTE — PLAN OF CARE
DX:  Acute hypoxemic respiratory failure    Shift Events: No acute changes to assessment. Pt rested comfortably on 3l nc     Plan of Care: Possible discharge today.     Neuro: AAOx 4     Respiratory:  3l nc      Cardiac: NSR     Diet:  Cardiac     Gtts: Saline locked     Skin: Rash under sascha breast folds

## 2020-09-08 NOTE — PLAN OF CARE
Problem: Physical Therapy Goal  Goal: Physical Therapy Goal  Description: Goals to be met by: 2020    Patient will increase functional independence with mobility by performin. Supine to sit with Modified [place] -not met  2. Sit to supine with Modified [place] -not met  3. Sit to stand transfer with Modified [place] -not met  4. Gait  x 20 feet with Stand-by Assistance using LRAD -Met 9/3/2020   5. Lower extremity exercise program x15 reps, with independence -not met  6. Pt receive gait training ~ 150 ft with RW, SBA and O2 intact. - not met     Outcome: Ongoing, Progressing   Goals updated for content and are appropriate. Mojgan Borjas, PT  2020

## 2020-09-08 NOTE — PROGRESS NOTES
[facility] - ICU 14 [facility] Medicine  Telemedicine Progress Note    Patient Name: Melvi Conrad  MRN: [id number]  Patient Class: IP- Inpatient   Admission Date: 8/28/2020  Length of Stay: 11 days  Attending Physician: Kaitlynn Vail MD  Primary Care Provider: Andrae Jj MD    [facility] Medicine Team: [facility] VIRTUAL TEAM 10 Kaitlynn Vail MD  Virtual Telemedicine Progress Note  Start time: 1601  Chief complaint: Acute hypoxemic respiratory failure  The patient location is: [id number]  The patient arrived at: 8/28/2020  6:44 PM  Present with the patient at the time of the telemed/virtual assessment: n/a  End time:  1608  Total time spent with patient: 7 min  I have assessed findings virtually using a telemedicine platform and with assistance of the bedside nurse or telemedicine presenter.  The attending portion of this evaluation, treatment, and documentation was performed per Kaitlynn Vail MD via audiovisual.    Patient was transferred to the telemedicine service on: 9/6/2020      Subjective:     Admission CC:   Chief Complaint   Patient presents with    COVID-19 Concerns     Diagnosed with covid on 08/21, called EMS for increasing shortness of breath today. Hx COPD.    Shortness of Breath     Follow up visit for: Acute hypoxemic respiratory failure    Interval History / Events Overnight:   The patient is able to provide adequate history. Additional history was obtained from chart review. No significant events reported by Nursing. Lost IV access; midline placed.  Patient complains of LE edema. Symptoms have improved since yesterday. Associated symptoms include: shortness of breath on exertion. Symptoms are decreasing in severity. Alleviating factors include: medication: Lasix.  SpO2 100% on 3 L NC    Data reviewed 9/8/2020: Lab test(s) reviewed: H&H stable. hypokalemia      Review of Systems   Constitutional: Negative for fatigue.   Respiratory: Positive for shortness of breath.       Objective:     Vital Signs (Most Recent):  Temp: 98.4 °F (36.9 °C) (09/08/20 1634)  Pulse: 80 (09/08/20 1634)  Resp: (!) 35 (09/08/20 1634)  BP: (!) 166/74 (09/08/20 1634)  SpO2: 96 % (09/08/20 1634) Vital Signs (24h Range):  Temp:  [98.1 °F (36.7 °C)-98.4 °F (36.9 °C)] 98.4 °F (36.9 °C)  Pulse:  [58-87] 80  Resp:  [16-39] 35  SpO2:  [92 %-99 %] 96 %  BP: (110-182)/() 166/74     Weight: 81.2 kg (179 lb)  Body mass index is 36.15 kg/m².    Intake/Output Summary (Last 24 hours) at 9/8/2020 1719  Last data filed at 9/8/2020 1700  Gross per 24 hour   Intake 1266 ml   Output --   Net 1266 ml      Physical Exam  Constitutional:       General: She is not in acute distress.     Appearance: Normal appearance. She is obese. She is not diaphoretic.   HENT:      Mouth/Throat:      Dentition: Abnormal dentition.   Eyes:      General: Lids are normal. No scleral icterus.        Right eye: No discharge.         Left eye: No discharge.      Conjunctiva/sclera: Conjunctivae normal.   Neck:      Trachea: Phonation normal.   Cardiovascular:      Rate and Rhythm: Normal rate.   Pulmonary:      Effort: Pulmonary effort is normal. Tachypnea present. No accessory muscle usage or respiratory distress.   Abdominal:      General: There is no distension.   Skin:     Coloration: Skin is pale.   Neurological:      Mental Status: She is alert and oriented to person, place, and time. She is not disoriented.   Psychiatric:         Attention and Perception: Attention normal.         Mood and Affect: Affect normal.         Speech: Speech normal.         Behavior: Behavior is cooperative.         Significant Labs:   Recent Labs   Lab 09/05/20  0044 09/06/20  2354 09/08/20  0953   WBC 8.26 8.39 11.05   HGB 9.2* 9.3* 10.5*   HCT 30.8* 30.4* 34.6*    177 179     Recent Labs   Lab 09/05/20  0044 09/06/20  2354 09/08/20  0953   GRAN 70.1  5.8 78.9*  6.6 70.5  7.8*   LYMPH 17.7*  1.5 12.5*  1.1 17.4*  1.9   MONO 8.2  0.7 6.1  0.5  8.2  0.9   EOS 0.0 0.0 0.3     Recent Labs   Lab 09/05/20  0400 09/07/20  0324 09/08/20  0953    137 140   K 4.2 4.5 3.3*    98 100   CO2 28 28 26   BUN 34* 41* 35*   CREATININE 0.9 1.0 1.1    117* 170*   CALCIUM 8.8 9.2 9.4   ALBUMIN  --   --  3.3*   MG 1.8 1.9  --    PHOS 3.7 4.5 3.2     Procalcitonin (ng/mL)   Date Value   08/28/2020 0.07     Lactate (Lactic Acid) (mmol/L)   Date Value   08/28/2020 1.3     BNP (pg/mL)   Date Value   09/06/2020 147 (H)   08/28/2020 167 (H)   07/28/2014 91     CRP (mg/L)   Date Value   09/05/2020 20.5 (H)   09/03/2020 13.6 (H)   09/01/2020 34.7 (H)   08/28/2020 101.0 (H)     D-Dimer (mg/L FEU)   Date Value   08/28/2020 2.54 (H)   07/28/2014 4.19 (H)     Ferritin (ng/mL)   Date Value   09/05/2020 214   09/03/2020 269   09/01/2020 287   08/28/2020 353 (H)     LD (U/L)   Date Value   08/28/2020 432 (H)     Troponin I (ng/mL)   Date Value   08/29/2020 0.052 (H)   08/28/2020 0.092 (H)   07/30/2014 0.068 (H)   07/29/2014 0.071 (H)   07/29/2014 0.062 (H)   07/28/2014 0.057 (H)     CPK (U/L)   Date Value   08/28/2020 34   07/29/2014 167     SARS-CoV-2 RNA, Amplification, Qual (no units)   Date Value   09/04/2020 Negative   09/01/2020 Positive (A)         Significant Imaging:  Echo 8/30/20  · Normal left ventricular systolic function. The estimated ejection fraction is 65%.  · Grade II (moderate) left ventricular diastolic dysfunction consistent with pseudonormalization.  · No wall motion abnormalities.  · Normal right ventricular systolic function.  · Mild left atrial enlargement.  · Aortic valve area is 1.02 cm2; peak velocity is 3.55 m/s; mean gradient is 27 mmHg.  · Moderate aortic valve stenosis.  · Mild mitral regurgitation.  · Mild tricuspid regurgitation.  · The estimated PA systolic pressure is 42 mmHg.  · Pulmonary hypertension present.  · Intermediate central venous pressure (8 mmHg).  · There is a right pleural effusion.      Assessment/Plan:      Active  Diagnoses:    Diagnosis Date Noted POA    PRINCIPAL PROBLEM:  Acute hypoxemic respiratory failure [J96.01] 08/28/2020 Yes    Essential hypertension [I10] 09/05/2020 Yes     Chronic    Seizure disorder [G40.909] 09/05/2020 Yes    COVID-19 virus infection [U07.1] 08/28/2020 Yes    COPD exacerbation [J44.1] 08/28/2020 Yes    Macrocytic anemia [D53.9] 08/28/2020 Yes      Problems Resolved During this Admission:       Overview / ICU Course:    Melvi Conrad is a 74 y.o. female admitted for Acute hypoxemic respiratory failure.    Inpatient Medications Prescribed for Management of Current Problems:     Scheduled Meds:    albuterol-ipratropium  3 mL Nebulization Q6H WAKE    amLODIPine  10 mg Oral Daily    atorvastatin  40 mg Oral Daily    dextromethorphan-guaifenesin  mg  1 tablet Oral BID    fluticasone furoate-vilanteroL  1 puff Inhalation Daily    folic acid-vit B6-vit B12 2.5-25-2 mg  1 tablet Oral Daily    guaiFENesin  600 mg Oral BID    hydrALAZINE  25 mg Oral QID    levETIRAcetam  500 mg Oral BID    metoprolol tartrate  25 mg Oral BID    miconazole NITRATE 2 %   Topical (Top) BID    predniSONE  10 mg Oral Daily    Followed by    [START ON 9/10/2020] predniSONE  5 mg Oral Daily    tiotropium  1 capsule Inhalation Daily     Continuous Infusions:   As Needed: sodium chloride, acetaminophen, albuterol, benzonatate, glucose, glucose, melatonin, ondansetron, promethazine (PHENERGAN) IVPB, promethazine-codeine 6.25-10 mg/5 ml, sodium chloride, sodium chloride 0.9%    Assessment and Plan by Problem    Viral Pneumonia due to COVID-19  - COVID-19 testing:   - Isolation: Airborne/Droplet.   - Diagnostics: Trend Q48hrs if stable, more frequently if patient decompensan  - Management:            - Telemetry & Continuous Pulse Oximetry    - Nutrition:               - Multivitamin PO daily              - Add Boost supplement              - Vitamin D 1000IU daily if deficient              - Ascorbic acid  500mg PO bid    - Supportive Care:              - acetaminophen 650mg PO Q6hr PRN fever/headache              - loperamide PRN viral diarrhea              - IVF if indicated, restrictive strategy preferred, no maintenance IV if able              - VTE PPx: enoxaparin or heparin SQ unless contraindicated    - Antibiotics  - completed antibiotic course.     - Investigational Therapies:- atorvastatin 40mg po daily        Acute Hypoxemic Respiratory Failure_COPD exacerbation  -continue patient on Breo and scheduled DuoNebs  -started Spiriva; also has additive therapy  -weaned to 3L, requested walker for her room, and 1x dose of IV Lasix 9/5 which may help her overall pulmonary symptoms as well as her LE edema.   -Continue current glucocorticoid therapy with taper for patient over the next 9 days.  Prednisone 20 mg x 3 days, followed by gradual decrease every 3 days.  - repeated Lasix IV x 1 on 9/6  - trial of Lasix 40 mg oral x 1 on 9/7; edema improved     Anemia, macrocytic  - Hgb 7.0 on presentation, baseline unclear. Denies any melena or hematochezia  - Recently started On arelto per SNF paperwork, but no diagnosis of blood clot listed and patient denies ever having one  - CTA ordered to rule out PE  - hold Xarelto in meantime given the anemia  - Continue home B12 supplementation  - Monitor CBC and transfuse to goal >7.   - transfused one unit pRBC on 8/31 for Hb 6.6.   - Hgb remains stable or rising  - SNF reports that Hb must be > 9 for admission - currently adequate  - check Folate/B12/MMA levels, and given a FOLBIC supplement empirically     Anticoagulation use  - on Xarelto at home, family report that patient was prescribed Xarelto as ppx in the setting of COVID infection   - discontinued Xarelto this admission, no history of Afib and has had US upper and lower extremities negative for DVT  - follow up with PCP to discuss resuming Xarelto, concern that this contributed to acute on chronic anemia patient had  earlier on hospital stay      HTN  - Continue home meds Norvasc, hydralazine, and Lopressor     Hx of CAD  - Per SNF paperwork, full history unclear  - Denies any active chest pain, no ST changes compared to prior EKG  -Troponin mildly elevated at 0.092, but this appears to be chronic and is not surprising in setting of demand from hypoxia.   - Low-suspicion for ACS, but will continue to trend troponin.   - TTE ordered given the patient's reported cardiac history and acitve hypoxia without a baseline/recent TTE in system  - Continue statin      Diet: Diet Cardiac  GI Prophylaxis: Not indicated  Significant LDAs:   IV Access Type: Peripheral  Urinary Catheter Indication if present: Patient Does Not Have Urinary Catheter  Other Lines/Tubes/Drains:    HIGH RISK CONDITION(S):   Patient has a condition that poses threat to life and bodily function: Severe Respiratory Distress COVID19 & COPD    Goals of Care:    Previous admission:  7/28/14  Likely prognosis:  Good  Code Status: Full Code  Comfort Only: No  Hospice: No  Goals at discharge: remain at home, live with family member    Discharge Planning   REANNA: 9/9/2020     Code Status: Full Code   Is the patient medically ready for discharge?: Yes    Reason for patient still in hospital (select all that apply): Patient trending condition and Treatment  Discharge Plan A: Skilled Nursing Facility        VTE Risk Mitigation (From admission, onward)         Ordered     IP VTE HIGH RISK PATIENT  Once      08/28/20 2223     Place sequential compression device  Until discontinued      08/28/20 2223                   Kaitlynn Vail MD  Department of Hospital Medicine   [facility] - ICU 14 [facility]

## 2020-09-08 NOTE — CARE UPDATE
"RAPID RESPONSE NURSE PROACTIVE ROUNDING NOTE     Time of Visit: 1045    Admit Date: 2020  LOS: 11  Code Status: Full Code   Date of Visit: 2020  : 1945  Age: 74 y.o.  Sex: female  Race: White  Bed: 34312/93951 A:   MRN: [id number]  Was the patient discharged from an ICU this admission? no   Was the patient discharged from a PACU within last 24 hours?  no  Did the patient receive conscious sedation/general anesthesia in last 24 hours?  no  Was the patient in the ED within the past 24 hours?  no  Was the patient started on NIPPV within the past 24 hours?  no  Attending Physician: Kaitlynn Vail MD  Primary Service: [facility] VIRTUAL TEAM 10    ASSESSMENT     Notified by chart Review .  Reason for alert: n/a    Diagnosis: Acute hypoxemic respiratory failure    Abnormal Vital Signs: /63   Pulse 78   Temp 98.4 °F (36.9 °C) (Oral)   Resp (!) 23   Ht 4' 11" (1.499 m)   Wt 81.2 kg (179 lb)   SpO2 96%   Breastfeeding No   BMI 36.15 kg/m²      Clinical Issues: Respiratory    Patient  has a past medical history of COPD (chronic obstructive pulmonary disease), Hypertension, Renal disorder, and Seizures.      Pt assessed and PIV access not able to be obtained. Midline consult placed. Pt VS stable at this time and pt awake, alert, and oriented. Pt to be followed.       INTERVENTIONS/ RECOMMENDATIONS     Close VS monitoring and notify with changes.     Discussed plan of care with Radha STEELE.    PHYSICIAN ESCALATION     Yes/No  no    Disposition: Remain in room 79905.    FOLLOW-UP     Call back the Rapid Response Nurse, Ephraim Buenrostro RN at [phone] for additional questions or concerns.          "

## 2020-09-08 NOTE — PROGRESS NOTES
"[facility] - ICU 14 WT  Adult Nutrition  Progress Note    SUMMARY       Recommendations    1. Continue cardiac diet + Boost Plus as tolerated.     2. Encourage po and ONS intake.     3. RD following.     Goals: continue to consume >75% of meals by RD follow-up  Nutrition Goal Status: new  Communication of RD Recs: (POC)    Reason for Assessment    Reason For Assessment: length of stay  Diagnosis: (Acute hypoxemic respiratory failure)  Relevant Medical History: HTN, CKD3, COPD, epilepsy, CAD   Interdisciplinary Rounds: did not attend  General Information Comments: Spoke with pt via phone. Pt reports good appetite with no N/V/D/C. Per chart review, pt eating 100% of meals. Pt states that she also drinks Boost ONS. Pt reports normal appetite with no wt loss PTA. NFPE not warranted. Pt does not meet malnutrition criteria.   Nutrition Discharge Planning: cardiac diet    Nutrition Risk Screen    Nutrition Risk Screen: no indicators present    Nutrition/Diet History    Spiritual, Cultural Beliefs, Confucianist Practices, Values that Affect Care: no  Factors Affecting Nutritional Intake: None identified at this time    Anthropometrics    Temp: 98.3 °F (36.8 °C)  Height Method: Stated  Height: 4' 11" (149.9 cm)  Height (inches): 59 in  Weight Method: Bed Scale  Weight: 81.2 kg (179 lb)  Weight (lb): 179 lb  Ideal Body Weight (IBW), Female: 95 lb  % Ideal Body Weight, Female (lb): 188.42 %  BMI (Calculated): 36.1  BMI Grade: 35 - 39.9 - obesity - grade II     Lab/Procedures/Meds    Pertinent Labs Reviewed: reviewed  Pertinent Labs Comments: noted  Pertinent Medications Reviewed: reviewed  Pertinent Medications Comments: amlodipine, statin, folic acid, lasix, metoprolol, prednisone     Estimated/Assessed Needs    Weight Used For Calorie Calculations: 81.2 kg (179 lb 0.2 oz)  Energy Calorie Requirements (kcal): 1521 kcal/day  Energy Need Method: Lizeth-[place](x 1.25)  Protein Requirements: 81-98 gm/day(1.0-1.2 " gm/kg)  Weight Used For Protein Calculations: 81.2 kg (179 lb 0.2 oz)  Fluid Requirements (mL): 1 mL/kcal or per MD     RDA Method (mL): 1521     Nutrition Prescription Ordered    Current Diet Order: Cardiac  Oral Nutrition Supplement: Boost Plus    Evaluation of Received Nutrient/Fluid Intake    Comments: LBM 9/7  Tolerance: tolerating  % Intake of Estimated Energy Needs: 75 - 100 %  % Meal Intake: 75 - 100 %    Nutrition Risk    Level of Risk/Frequency of Follow-up: (f/u 1 x wk)     Assessment and Plan    Nutrition Problem  Increased Energy Needs     Related to (etiology):   Physiological demands     Signs and Symptoms (as evidenced by):   COVID-19 Infection      Interventions (treatment strategy):  Collaboration of nutrition care with other providers  Modified Diet - Cardiac  Commercial Beverage - Boost Plus     Nutrition Diagnosis Status:   New     Monitor and Evaluation    Food and Nutrient Intake: energy intake, food and beverage intake  Food and Nutrient Adminstration: diet order  Physical Activity and Function: nutrition-related ADLs and IADLs  Anthropometric Measurements: weight, weight change, body mass index  Biochemical Data, Medical Tests and Procedures: electrolyte and renal panel, gastrointestinal profile, glucose/endocrine profile, inflammatory profile, lipid profile  Nutrition-Focused Physical Findings: overall appearance     Nutrition Follow-Up    RD Follow-up?: Yes

## 2020-09-09 VITALS
TEMPERATURE: 98 F | DIASTOLIC BLOOD PRESSURE: 78 MMHG | SYSTOLIC BLOOD PRESSURE: 142 MMHG | RESPIRATION RATE: 28 BRPM | HEIGHT: 59 IN | HEART RATE: 71 BPM | BODY MASS INDEX: 36.08 KG/M2 | WEIGHT: 179 LBS | OXYGEN SATURATION: 98 %

## 2020-09-09 PROBLEM — U07.1 COVID-19 VIRUS INFECTION: Status: RESOLVED | Noted: 2020-08-28 | Resolved: 2020-09-09

## 2020-09-09 PROBLEM — J96.11 CHRONIC RESPIRATORY FAILURE WITH HYPOXIA: Status: ACTIVE | Noted: 2020-09-09

## 2020-09-09 PROBLEM — J96.01 ACUTE HYPOXEMIC RESPIRATORY FAILURE: Status: RESOLVED | Noted: 2020-08-28 | Resolved: 2020-09-09

## 2020-09-09 PROBLEM — Z86.16 HISTORY OF 2019 NOVEL CORONAVIRUS DISEASE (COVID-19): Status: ACTIVE | Noted: 2020-09-09

## 2020-09-09 PROCEDURE — 27000221 HC OXYGEN, UP TO 24 HOURS

## 2020-09-09 PROCEDURE — 25000003 PHARM REV CODE 250: Performed by: INTERNAL MEDICINE

## 2020-09-09 PROCEDURE — 25000242 PHARM REV CODE 250 ALT 637 W/ HCPCS: Performed by: HOSPITALIST

## 2020-09-09 PROCEDURE — 94640 AIRWAY INHALATION TREATMENT: CPT

## 2020-09-09 PROCEDURE — 99239 HOSP IP/OBS DSCHRG MGMT >30: CPT | Mod: ,,, | Performed by: INTERNAL MEDICINE

## 2020-09-09 PROCEDURE — 94761 N-INVAS EAR/PLS OXIMETRY MLT: CPT

## 2020-09-09 PROCEDURE — 63600175 PHARM REV CODE 636 W HCPCS: Performed by: HOSPITALIST

## 2020-09-09 PROCEDURE — 99239 PR HOSPITAL DISCHARGE DAY,>30 MIN: ICD-10-PCS | Mod: ,,, | Performed by: INTERNAL MEDICINE

## 2020-09-09 PROCEDURE — 25000003 PHARM REV CODE 250: Performed by: HOSPITALIST

## 2020-09-09 PROCEDURE — 99900035 HC TECH TIME PER 15 MIN (STAT)

## 2020-09-09 RX ORDER — PREDNISONE 5 MG/1
5 TABLET ORAL DAILY
Start: 2020-09-10 | End: 2020-09-13

## 2020-09-09 RX ADMIN — HYDRALAZINE HYDROCHLORIDE 25 MG: 25 TABLET ORAL at 01:09

## 2020-09-09 RX ADMIN — FOLIC ACID-PYRIDOXINE-CYANOCOBALAMIN TAB 2.5-25-2 MG 1 TABLET: 2.5-25-2 TAB at 08:09

## 2020-09-09 RX ADMIN — HYDRALAZINE HYDROCHLORIDE 25 MG: 25 TABLET ORAL at 08:09

## 2020-09-09 RX ADMIN — GUAIFENESIN AND DEXTROMETHORPHAN HYDROBROMIDE 1 TABLET: 600; 30 TABLET, EXTENDED RELEASE ORAL at 08:09

## 2020-09-09 RX ADMIN — POTASSIUM CHLORIDE 20 MEQ: 1500 TABLET, EXTENDED RELEASE ORAL at 08:09

## 2020-09-09 RX ADMIN — PREDNISONE 10 MG: 5 TABLET ORAL at 08:09

## 2020-09-09 RX ADMIN — LEVETIRACETAM 500 MG: 500 TABLET ORAL at 08:09

## 2020-09-09 RX ADMIN — ATORVASTATIN CALCIUM 40 MG: 20 TABLET, FILM COATED ORAL at 08:09

## 2020-09-09 RX ADMIN — IPRATROPIUM BROMIDE AND ALBUTEROL SULFATE 3 ML: .5; 2.5 SOLUTION RESPIRATORY (INHALATION) at 07:09

## 2020-09-09 RX ADMIN — FLUTICASONE FUROATE AND VILANTEROL TRIFENATATE 1 PUFF: 100; 25 POWDER RESPIRATORY (INHALATION) at 07:09

## 2020-09-09 RX ADMIN — METOPROLOL TARTRATE 25 MG: 25 TABLET, FILM COATED ORAL at 08:09

## 2020-09-09 RX ADMIN — AMLODIPINE BESYLATE 10 MG: 10 TABLET ORAL at 08:09

## 2020-09-09 RX ADMIN — IPRATROPIUM BROMIDE AND ALBUTEROL SULFATE 3 ML: .5; 2.5 SOLUTION RESPIRATORY (INHALATION) at 01:09

## 2020-09-09 RX ADMIN — GUAIFENESIN 600 MG: 600 TABLET, EXTENDED RELEASE ORAL at 08:09

## 2020-09-09 RX ADMIN — MICONAZOLE NITRATE: 20 POWDER TOPICAL at 09:09

## 2020-09-09 NOTE — PLAN OF CARE
09/09/20 1550   Post-Acute Status   Post-Acute Authorization Placement   Post-Acute Placement Status Set-up Complete       PT to return to [facility] [address] [place] [address] [address].     NIESHA set transport through [facility] (stretcher) *4:45 PM. This is an estimated time.     Nurse to call report to [phone].     NIESHA contacted [facility] to advise of transfer. [facility] is in agreement with transfer. Notified that pt will be transferred from isolation unit (at [facility]) on Monday.     Glenys Cao LMSW  Case Management Social Worker   [facility] [address]

## 2020-09-09 NOTE — PLAN OF CARE
DX:  Acute hypoxemic respiratory failure     Shift Events: No acute changes to assessment. Pt rested comfortably on 3l nc     Plan of Care: Placement pending for discharge     Neuro: AAOx 4     Respiratory:  3l nc      Cardiac: NSR     Diet:  Cardiac     Gtts: Saline locked     Skin: Rash under sascha breast folds

## 2020-09-09 NOTE — DISCHARGE SUMMARY
[facility] - ICU 14 [facility] Hospital Medicine  Telemedicine Discharge Summary      Patient Name: Melvi Conrad  MRN: [id number]  Admission Date: 8/28/2020  Hospital Length of Stay: 12 days  Discharge Date and Time:  09/09/2020 3:40 PM  Attending Physician: Kaitlynn Vail MD   Discharging Provider: Kaitlynn Vail MD  Primary Care Provider: Andrae Jj MD    [facility] Medicine Team: [facility] VIRTUAL TEAM 10 Kaitlynn Vail MD  Patient was transferred to the telemedicine service on: 9/6/2020  This document was prepared by chart review and may not directly reflect my personal knowledge of the patient's case, clinical course, or significant events during the hospital stay.      HPI:  73 yo F with HTN, CKD3, COPD on 2L NC, epilepsy, and CAD presents to the hospital from [facility] with worsening SOB one week after being discharged from [facility] for COVID-19 pneumonia. The patient reports that over the last 2 days at the SNF, she has developed a progressive decline in her breathing. She reports that she was unable to participate much with therapy today and yesterday due to SOB. She reports that she had been unable to receive her nebulizer treatments while in the SNF due to concerns of aerosolization of the virus, and she believes this significantly affected her recovery. She denies any chest pain, lightheadedness, palpitations, fevers, chills, or new cough.    Per son, the pateint was admitted to [facility] for symptoms of difficulty breathing, coughing, and fatigue. She tested positive for COVID-19 on admission and was admitted from 8/5- 8/21. Patient and son report that she spent the majority of her stay in the ICU, but she was never intubated. She received remdesivir, steroids, and convalescent plasma per son. She was also reportedly started on Xarelto, but the reasoning is unclear. The son and patient deny that she ever had a blood clot. They state that she was put on the medication to prevent blood  clots in the setting of COVID-19 infection. She was improving at time of discharge, but did require 2L NC O2 at time of discharge which she had been on at [facility] until today.        * No surgery found *      Hospital Course:   Melvi Conrad was admitted to Hospital Medicine for treatment of COPD complicated by recent COVID-19 viral infection and was treated with supportive care following a comprehensive physical, radiographic, and lab evaluation tailored to the current standard of care for COVID19 at that time. Please review the admission H&P and the studies listed below for details. She improved with supportive care and was found to be suitable for discharge to SNF with oxygen. 3 LPM NC; wean to 2 LPM NC (baseline) as tolerated     Additional details of the hospitalization include:    Viral Pneumonia due to COVID-19  - COVID-19 testing:   - Isolation: Airborne/Droplet.   - Diagnostics: Trend Q48hrs if stable, more frequently if patient decompensan  - Management:            - Telemetry & Continuous Pulse Oximetry    - Nutrition:               - Multivitamin PO daily              - Add Boost supplement              - Vitamin D 1000IU daily if deficient              - Ascorbic acid 500mg PO bid    - Supportive Care:              - acetaminophen 650mg PO Q6hr PRN fever/headache              - loperamide PRN viral diarrhea              - IVF if indicated, restrictive strategy preferred, no maintenance IV if able              - VTE PPx: enoxaparin or heparin SQ unless contraindicated    - Antibiotics  - completed antibiotic course.     - Investigational Therapies:- atorvastatin 40mg po daily     Acute Hypoxemic Respiratory Failure_COPD exacerbation  -continue patient on Breo and scheduled DuoNebs  -started Spiriva; also has additive therapy  -weaned to 3L, requested walker for her room, and 1x dose of IV Lasix 9/5 which may help her overall pulmonary symptoms as well as her LE edema.   -Continue  current glucocorticoid therapy with taper for patient over the next 9 days.  Prednisone 20 mg x 3 days, followed by gradual decrease every 3 days.  - repeated Lasix IV x 1 on 9/6  - trial of Lasix 40 mg oral x 1 on 9/7; edema improved     Anemia, macrocytic  - Hgb 7.0 on presentation, baseline unclear. Denies any melena or hematochezia  - Recently started On arelto per SNF paperwork, but no diagnosis of blood clot listed and patient denies ever having one  - CTA ordered to rule out PE  - hold Xarelto in meantime given the anemia  - Continue home B12 supplementation  - Monitor CBC and transfuse to goal >7.   - transfused one unit pRBC on 8/31 for Hb 6.6.   - Hgb remains stable or rising  - [facility] reports that Hb must be > 9 for admission - currently adequate  - check Folate/B12/MMA levels, and given a FOLBIC supplement empirically     Anticoagulation use  - on Xarelto at home, family report that patient was prescribed Xarelto as ppx in the setting of COVID infection   - discontinued Xarelto this admission, no history of Afib and has had US upper and lower extremities negative for DVT  - follow up with PCP to discuss resuming Xarelto, concern that this contributed to acute on chronic anemia patient had earlier on hospital stay      HTN  - Continue home meds Norvasc, hydralazine, and Lopressor     Hx of CAD  - Per SNF paperwork, full history unclear  - Denies any active chest pain, no ST changes compared to prior EKG  -Troponin mildly elevated at 0.092, but this appears to be chronic and is not surprising in setting of demand from hypoxia.   - Low-suspicion for ACS, but trend troponin.   - TTE ordered given the patient's reported cardiac history and acitve hypoxia without a baseline/recent TTE in system  - Continue statin    Consults:   Consults (From admission, onward)        Status Ordering Provider     Inpatient consult to Midline team  Once     Provider:  (Not yet assigned)    Completed SANFORD COLVIN     Inpatient  consult to Midline team  Once     Provider:  (Not yet assigned)    Completed BRANDON BARBOUR     Inpatient virtual consult to Hospital Medicine  Once     Provider:  (Not yet assigned)    Completed SANFORD COLVIN     Inpatient virtual consult to Hospital Medicine  Once     Provider:  (Not yet assigned)    Completed TAM STAUFFER          Final Active Diagnoses:    Diagnosis Date Noted POA    History of 2019 novel coronavirus disease (COVID-19) [Z86.19] 09/09/2020 Yes    Solitary kidney [Q60.0] 09/09/2020 Not Applicable    Chronic respiratory failure with hypoxia [J96.11] 09/09/2020 Yes    Essential hypertension [I10] 09/05/2020 Yes     Chronic    Seizure disorder [G40.909] 09/05/2020 Yes    COPD exacerbation [J44.1] 08/28/2020 Yes    Macrocytic anemia [D53.9] 08/28/2020 Yes      Problems Resolved During this Admission:    Diagnosis Date Noted Date Resolved POA    PRINCIPAL PROBLEM:  Acute hypoxemic respiratory failure [J96.01] 08/28/2020 09/09/2020 Yes    COVID-19 virus infection [U07.1] 08/28/2020 09/09/2020 Yes      Discharged Condition: stable    Disposition: Skilled Nursing Facility    Follow Up:  Follow-up Information     Schedule an appointment as soon as possible for a visit with Andrae jJ MD.    Specialty: Internal Medicine  Why: For discharge from hospital follow up  Contact information:  [address]  [address]  [facility] [id number] [phone]                 Patient Instructions:      Diet Cardiac     Notify your health care provider if you experience any of the following:  temperature >100.4     Notify your health care provider if you experience any of the following:  persistent nausea and vomiting or diarrhea     Notify your health care provider if you experience any of the following:  difficulty breathing or increased cough     Notify your health care provider if you experience any of the following:  severe persistent headache     Notify your health care provider if you  experience any of the following:  persistent dizziness, light-headedness, or visual disturbances     Notify your health care provider if you experience any of the following:  increased confusion or weakness     Activity as tolerated     Medications:  Reconciled Home Medications:      Medication List      START taking these medications    acetaminophen 325 MG tablet  Commonly known as: TYLENOL  Take 2 tablets (650 mg total) by mouth every 4 (four) hours as needed.     albuterol-ipratropium 2.5 mg-0.5 mg/3 mL nebulizer solution  Commonly known as: DUO-NEB  Take 3 mLs by nebulization every 6 (six) hours while awake. Rescue     dextromethorphan-guaifenesin  mg  mg per 12 hr tablet  Commonly known as: MUCINEX DM  Take 1 tablet by mouth 2 (two) times daily. for 10 days     folic acid-vit B6-vit B12 2.5-25-2 mg 2.5-25-2 mg Tab  Commonly known as: FOLBIC or Equiv  Take 1 tablet by mouth once daily.     miconazole NITRATE 2 % 2 % top powder  Commonly known as: MICOTIN  Apply topically 2 (two) times daily. Apply to rash underneath bilateral breasts     predniSONE 5 MG tablet  Commonly known as: DELTASONE  Take 1 tablet (5 mg total) by mouth once daily. for 3 days  Start taking on: September 10, 2020     sodium chloride 0.65 % nasal spray  Commonly known as: OCEAN  1 spray by Nasal route every 2 (two) hours as needed for Congestion.     tiotropium 18 mcg inhalation capsule  Commonly known as: SPIRIVA  Inhale 1 capsule (18 mcg total) into the lungs once daily. Controller        CHANGE how you take these medications    albuterol 90 mcg/actuation inhaler  Commonly known as: PROAIR HFA  Inhale 2 puffs into the lungs every 4 (four) hours as needed for Wheezing or Shortness of Breath.  What changed:   · when to take this  · reasons to take this     olodateroL 2.5 mcg/actuation Mist  Commonly known as: STRIVERDI RESPIMAT  Inhale 2 puffs into the lungs once daily. Controller  What changed:   · how much to take  · when to  take this        CONTINUE taking these medications    amLODIPine 10 MG tablet  Commonly known as: NORVASC  Take 10 mg by mouth once daily.     atorvastatin 40 MG tablet  Commonly known as: LIPITOR  Take 40 mg by mouth once daily.     benzonatate 100 MG capsule  Commonly known as: TESSALON  Take 100 mg by mouth 3 (three) times daily as needed for Cough.     hydrALAZINE 25 MG tablet  Commonly known as: APRESOLINE  Take 25 mg by mouth 4 (four) times daily.     levETIRAcetam 500 MG Tab  Commonly known as: KEPPRA  Take 500 mg by mouth 2 (two) times daily.     metoprolol tartrate 25 MG tablet  Commonly known as: LOPRESSOR  Take 25 mg by mouth 2 (two) times daily.        STOP taking these medications    cyanocobalamin 500 MCG tablet     lisinopriL 20 MG tablet  Commonly known as: PRINIVIL,ZESTRIL     ondansetron 4 MG Tbdl  Commonly known as: ZOFRAN-ODT     XARELTO 20 mg Tab  Generic drug: rivaroxaban            Significant Diagnostic Studies:  Recent Labs   Lab 09/05/20  0044 09/06/20  2354 09/08/20  0953   WBC 8.26 8.39 11.05   HGB 9.2* 9.3* 10.5*   HCT 30.8* 30.4* 34.6*    177 179     Recent Labs   Lab 09/05/20  0044 09/06/20  2354 09/08/20  0953   GRAN 70.1  5.8 78.9*  6.6 70.5  7.8*   LYMPH 17.7*  1.5 12.5*  1.1 17.4*  1.9   MONO 8.2  0.7 6.1  0.5 8.2  0.9   EOS 0.0 0.0 0.3     Recent Labs   Lab 09/05/20  0400 09/07/20  0324 09/08/20  0953    137 140   K 4.2 4.5 3.3*    98 100   CO2 28 28 26   BUN 34* 41* 35*   CREATININE 0.9 1.0 1.1    117* 170*   CALCIUM 8.8 9.2 9.4   ALBUMIN  --   --  3.3*   MG 1.8 1.9  --    PHOS 3.7 4.5 3.2     Procalcitonin (ng/mL)   Date Value   08/28/2020 0.07     Lactate (Lactic Acid) (mmol/L)   Date Value   08/28/2020 1.3     BNP (pg/mL)   Date Value   09/06/2020 147 (H)   08/28/2020 167 (H)   07/28/2014 91     CRP (mg/L)   Date Value   09/05/2020 20.5 (H)   09/03/2020 13.6 (H)   09/01/2020 34.7 (H)   08/28/2020 101.0 (H)     D-Dimer (mg/L FEU)   Date Value    08/28/2020 2.54 (H)   07/28/2014 4.19 (H)     Ferritin (ng/mL)   Date Value   09/05/2020 214   09/03/2020 269   09/01/2020 287   08/28/2020 353 (H)     LD (U/L)   Date Value   08/28/2020 432 (H)     Troponin I (ng/mL)   Date Value   08/29/2020 0.052 (H)   08/28/2020 0.092 (H)   07/30/2014 0.068 (H)   07/29/2014 0.071 (H)   07/29/2014 0.062 (H)   07/28/2014 0.057 (H)     CPK (U/L)   Date Value   08/28/2020 34   07/29/2014 167     SARS-CoV-2 RNA, Amplification, Qual (no units)   Date Value   09/04/2020 Negative   09/01/2020 Positive (A)     Results for orders placed or performed during the hospital encounter of 08/28/20   Echo Color Flow Doppler? Yes   Result Value Ref Range    Ascending aorta 2.16 cm    STJ 1.88 cm    AV mean gradient 27 mmHg    Ao peak hardeep 3.55 m/s    Ao VTI 75.00 cm    IVRT 59.94 msec    IVS 0.65 0.6 - 1.1 cm    LA size 2.99 cm    Left Atrium Major Axis 6.11 cm    Left Atrium Minor Axis 6.09 cm    LVIDD 3.86 3.5 - 6.0 cm    LVIDS 2.86 2.1 - 4.0 cm    LVOT diameter 2.10 cm    LVOT peak VTI 22.00 cm    PW 0.72 0.6 - 1.1 cm    MV Peak A Hardeep 1.40 m/s    E wave decelartion time 203.13 msec    MV Peak E Hardeep 1.50 m/s    RA Major Axis 4.87 cm    RA Width 3.22 cm    RVDD 2.60 cm    Sinus 2.45 cm    TAPSE 2.05 cm    TR Max Hardeep 2.93 m/s    TDI LATERAL 0.05 m/s    TDI SEPTAL 0.04 m/s    LA WIDTH 4.38 cm    MV stenosis pressure 1/2 time 58.91 ms    LV Diastolic Volume 64.31 mL    LV Systolic Volume 31.24 mL    RV S' 12.21 cm/s    LVOT peak hardeep 1.09 m/s    LV LATERAL E/E' RATIO 30.00 m/s    LV SEPTAL E/E' RATIO 37.50 m/s    FS 26 %    LA volume 67.90 cm3    LV mass 71.77 g    Left Ventricle Relative Wall Thickness 0.37 cm    AV valve area 1.02 cm2    AV Velocity Ratio 0.31     AV index (prosthetic) 0.29     MV valve area p 1/2 method 3.73 cm2    E/A ratio 1.07     Mean e' 0.05 m/s    LVOT area 3.5 cm2    LVOT stroke volume 76.16 cm3    AV peak gradient 50 mmHg    E/E' ratio 33.33 m/s    LV Systolic Volume  Index 17.8 mL/m2    LV Diastolic Volume Index 36.56 mL/m2    LA Volume Index 38.6 mL/m2    LV Mass Index 41 g/m2    Triscuspid Valve Regurgitation Peak Gradient 34 mmHg    BSA 1.84 m2    Right Atrial Pressure (from IVC) 8 mmHg    TV rest pulmonary artery pressure 42 mmHg    Narrative    · Normal left ventricular systolic function. The estimated ejection   fraction is 65%.  · Grade II (moderate) left ventricular diastolic dysfunction consistent   with pseudonormalization.  · No wall motion abnormalities.  · Normal right ventricular systolic function.  · Mild left atrial enlargement.  · Aortic valve area is 1.02 cm2; peak velocity is 3.55 m/s; mean gradient   is 27 mmHg.  · Moderate aortic valve stenosis.  · Mild mitral regurgitation.  · Mild tricuspid regurgitation.  · The estimated PA systolic pressure is 42 mmHg.  · Pulmonary hypertension present.  · Intermediate central venous pressure (8 mmHg).  · There is a right pleural effusion.          Pending Diagnostic Studies:     Procedure Component Value Units Date/Time    Methylmalonic Acid, Serum [[id number]] Collected: 09/06/20 0839    Order Status: Sent Lab Status: In process Updated: 09/06/20 0849    Specimen: Blood     Narrative:      Collection has been rescheduled by GVAaron at 09/06/2020 06:04 Reason: no   able to collect talk to nurse Williams        Indwelling Lines/Drains at time of discharge:  none    Time spent on the discharge of patient: 50 minutes  Patient was seen and examined on the date of discharge and determined to be suitable for discharge.  Time was spent speaking with consultants and case management, reviewing records, and/or discussing the plan of care with patient/family.  Start time: 1352  Chief complaint: Acute hypoxemic respiratory failure  The patient location is: 48883/17[address] A  The patient arrived at: 8/28/2020  6:44 PM  Present with the patient at the time of the telemed/virtual assessment: n/a  End time:  1357  Total time spent with patient: 5  min  I have assessed findings virtually using a telemedicine platform and with assistance of the bedside nurse or telemedicine presenter.  The attending portion of this evaluation, treatment, and documentation was performed per Kaitlynn Vail MD via audiovisual.       Kaitlynn Vail MD  Department of Hospital Medicine  [facility] - ICU 14 WT

## 2020-09-09 NOTE — PLAN OF CARE
[facility]     Department of Hospital Medicine     [address] [place] [address]     [phone] after hours  [phone] fax       NURSING HOME ORDERS    09/09/2020    Admit to Nursing Home:   Skilled Bed                                                  Diagnoses:  Active Hospital Problems    Diagnosis  POA    History of 2019 novel coronavirus disease (COVID-19) [Z86.19]  Yes    Solitary kidney [Q60.0]  Not Applicable    Chronic respiratory failure with hypoxia [J96.11]  Yes    Essential hypertension [I10]  Yes     Chronic    Seizure disorder [G40.909]  Yes    COPD exacerbation [J44.1]  Yes    Macrocytic anemia [D53.9]  Yes      Resolved Hospital Problems    Diagnosis Date Resolved POA    *Acute hypoxemic respiratory failure [J96.01] 09/09/2020 Yes    COVID-19 virus infection [U07.1] 09/09/2020 Yes       Patient is homebound due to:  Acute hypoxemic respiratory failure    Allergies:  Review of patient's allergies indicates:   Allergen Reactions    Nexium [esomeprazole magnesium] Hives    Pcn [penicillins] Hives       Vitals:     Every shift (Skilled Nursing patients)    Diet:  Diet Cardiac    Acitivities:     - Up in a chair each morning as tolerated   - Ambulate with assistance to bathroom   - May use walker    LABS:  Per facility protocol    Nursing Precautions:     - Aspiration precautions:             -  Assistance with meals            -  Upright 90 degrees before, during and after meals       - Fall precautions per nursing home protocol   - Seizure precaution per long-term protocol   - Decubitus precautions:        -  for positioning   - Pressure reducing foam mattress   - Turn patient every two hours. Use wedge pillows to anchor patient    CONSULTS:       Physical Therapy to evaluate and treat     Occupational Therapy to evaluate and treat     Nutrition to evaluate and recommend diet      MISCELLANEOUS CARE:       Routine Skin Care:   Apply moisture barrier cream to all    skin folds and wet areas in perineal area daily and after baths and                           all bowel movements.     Oxygen: 3 LPM NC; wean to 2 LPM NC (baseline) as tolerated     Medications: Discontinue all previous medication orders, if any. See new list below.     acetaminophen (TYLENOL) 325 MG tablet  Take 2 tablets (650 mg total) by mouth every 4 (four) hours as needed.     albuterol (PROAIR HFA) 90 mcg/actuation inhaler  Inhale 2 puffs into the lungs every 4 (four) hours as needed for Wheezing or Shortness of Breath.     albuterol-ipratropium (DUO-NEB) 2.5 mg-0.5 mg/3 mL nebulizer solution  Take 3 mLs by nebulization every 6 (six) hours while awake. Rescue     amLODIPine (NORVASC) 10 MG tablet  Take 10 mg by mouth once daily.     atorvastatin (LIPITOR) 40 MG tablet  Take 40 mg by mouth once daily.     benzonatate (TESSALON) 100 MG capsule  Take 100 mg by mouth 3 (three) times daily as needed for Cough.     dextromethorphan-guaifenesin  mg (MUCINEX DM)  mg per 12 hr tablet  Take 1 tablet by mouth 2 (two) times daily. for 10 days     folic acid-vit B6-vit B12 2.5-25-2 mg (FOLBIC OR EQUIV) 2.5-25-2 mg Tab  Take 1 tablet by mouth once daily.     hydrALAZINE (APRESOLINE) 25 MG tablet  Take 25 mg by mouth 4 (four) times daily.     levETIRAcetam (KEPPRA) 500 MG Tab  Take 500 mg by mouth 2 (two) times daily.     metoprolol tartrate (LOPRESSOR) 25 MG tablet  Take 25 mg by mouth 2 (two) times daily.     miconazole NITRATE 2 % (MICOTIN) 2 % top powder  Apply topically 2 (two) times daily. Apply to rash underneath bilateral breasts     olodateroL (STRIVERDI RESPIMAT) 2.5 mcg/actuation Mist  Inhale 2 oral inhalations into the lungs daily. Controller     predniSONE (DELTASONE) 5 MG tablet  Take 1 tablet (5 mg total) by mouth once daily for 3 days     sodium chloride (OCEAN) 0.65 % nasal spray  1 spray by Nasal route every 2 (two) hours as needed for Congestion.      tiotropium (SPIRIVA) 18 mcg inhalation capsule  Inhale 1 capsule (18 mcg total) into the lungs once daily. Controller           Kaitlynn Vail MD  09/09/2020

## 2020-09-09 NOTE — PLAN OF CARE
10:54 AM    NIESHA f/u with [facility] re pt and COVID status. Admissions to send recent COVID test to director for review to determine if pt can be placed with gen pop and receive neb tx.     NIESHA requested orders from Dr. Vail.     1:17 PM  Facility med director number provided to Dr. Vail for [id number] if needed.   Facility stated they can take pt back and place on isolation unit for 5 days. Facility stated they would provide neb tx.     NIESHA contacted [facility] Richardbere  with pt d/c updates.   Librado stated he received notice that pt could possibly admit to [facility]. NIESHA explained that [facility] has not opened pt referral and that protocol requires admit to first available facility due to pt med readiness.   SW will f/u with [facility] to see bed availability for today. NIESHA explained that pt would have 2nd COVID test and stated that [facility] stated pt MAY be able to be in gen SNF pop if second test came back negative. Librado expressed understanding of plan and agreed with d/c back to [facility]. Expressed he would like to know the results of second COVID test.     Awaiting further updates.     Glenys Cao, [facility]  Case Management Social Worker   [facility]

## 2020-09-10 NOTE — PLAN OF CARE
Patient discharged and returned to [facility] [address] [place] [address] [address].      NIESHA set up transport and contacted [facility] Conrad to advise of transfer. Librado is in agreement with transfer. NIESHA notified that pt will be transferred from isolation unit (at [facility]) on Monday.     Michelle Quan RN   Case Management  [phone]       09/10/20 0922   Final Note   Assessment Type Final Discharge Note   Anticipated Discharge Disposition SNF  (Returned to [facility])   Hospital Follow Up  Appt(s) scheduled? Yes   Discharge plans and expectations educations in teach back method with documentation complete? Yes   Post-Acute Status   Post-Acute Authorization Placement   Post-Acute Placement Status Set-up Complete   Discharge Delays None known at this time

## 2020-09-11 LAB — METHYLMALONATE SERPL-SCNC: 0.31 UMOL/L
